# Patient Record
Sex: FEMALE | Race: OTHER | Employment: UNEMPLOYED | URBAN - METROPOLITAN AREA
[De-identification: names, ages, dates, MRNs, and addresses within clinical notes are randomized per-mention and may not be internally consistent; named-entity substitution may affect disease eponyms.]

---

## 2017-01-09 ENCOUNTER — TELEPHONE (OUTPATIENT)
Dept: INTERNAL MEDICINE CLINIC | Facility: CLINIC | Age: 62
End: 2017-01-09

## 2017-01-09 DIAGNOSIS — G47.30 SLEEP APNEA, UNSPECIFIED TYPE: Primary | ICD-10-CM

## 2017-01-09 NOTE — TELEPHONE ENCOUNTER
Patient needs referral for all new C Pap machine part.  Hose, filter, and all other parts.  ( except the machine )

## 2017-01-12 ENCOUNTER — OFFICE VISIT (OUTPATIENT)
Dept: INTERNAL MEDICINE CLINIC | Facility: CLINIC | Age: 62
End: 2017-01-12

## 2017-01-12 VITALS
TEMPERATURE: 98 F | WEIGHT: 155.81 LBS | HEART RATE: 69 BPM | BODY MASS INDEX: 28.67 KG/M2 | SYSTOLIC BLOOD PRESSURE: 134 MMHG | HEIGHT: 62 IN | RESPIRATION RATE: 18 BRPM | DIASTOLIC BLOOD PRESSURE: 78 MMHG

## 2017-01-12 DIAGNOSIS — I10 ESSENTIAL HYPERTENSION: Primary | ICD-10-CM

## 2017-01-12 DIAGNOSIS — E11.9 TYPE 2 DIABETES MELLITUS WITHOUT COMPLICATION, WITH LONG-TERM CURRENT USE OF INSULIN (HCC): ICD-10-CM

## 2017-01-12 DIAGNOSIS — Z79.4 TYPE 2 DIABETES MELLITUS WITHOUT COMPLICATION, WITH LONG-TERM CURRENT USE OF INSULIN (HCC): ICD-10-CM

## 2017-01-12 PROCEDURE — 99212 OFFICE O/P EST SF 10 MIN: CPT | Performed by: INTERNAL MEDICINE

## 2017-01-12 PROCEDURE — 99214 OFFICE O/P EST MOD 30 MIN: CPT | Performed by: INTERNAL MEDICINE

## 2017-01-12 NOTE — ASSESSMENT & PLAN NOTE
Blood pressure 134/78, pulse 69, temperature 97.7 °F (36.5 °C), temperature source Oral, resp. rate 18, height 5' 2\" (1.575 m), weight 155 lb 12.8 oz (70.67 kg), not currently breastfeeding.    Patient is currently on Tiazac at 180 mg once daily with figueroa

## 2017-01-12 NOTE — TELEPHONE ENCOUNTER
please advise as does not meet RN protocol for refill criteria- HgA1c out of parameter          Diabetes Medications  Protocol Criteria:  · Appointment scheduled in the past 6 months or the next 3 months  · A1C < 7.5 in the past 6 months  · Creatinine in

## 2017-01-12 NOTE — PROGRESS NOTES
HPI:    Patient ID: Omar Davis is a 64year old female. Diabetes  She presents for her follow-up diabetic visit. She has type 2 diabetes mellitus. Her disease course has been stable. There are no hypoglycemic associated symptoms.  There are no diabet sedentary lifestyle. Past treatments include angiotensin blockers, diuretics, lifestyle changes and calcium channel blockers (has not yet started the tiazac). The current treatment provides moderate improvement.  Compliance problems include exercise and die 300 MG Oral Cap 1 capsule by mouth twice a day Disp: 180 capsule Rfl: 2   Insulin Aspart Prot & Aspart (INSULIN ASPART PROT & ASPART) (70-30) 100 UNIT/ML Subcutaneous Suspension 55 units subcutaneous twice daily Disp: 45 Device Rfl: 2     Allergies:No Know (primary encounter diagnosis)  Type 2 diabetes mellitus without complication, with long-term current use of insulin (hcc)  Problem List Items Addressed This Visit        Unprioritized    Essential hypertension - Primary     Blood pressure 134/78, pulse 69,

## 2017-01-12 NOTE — PATIENT INSTRUCTIONS
Problem List Items Addressed This Visit        Unprioritized    Essential hypertension - Primary     Blood pressure 134/78, pulse 69, temperature 97.7 °F (36.5 °C), temperature source Oral, resp.  rate 18, height 5' 2\" (1.575 m), weight 155 lb 12.8 oz (70.

## 2017-01-12 NOTE — ASSESSMENT & PLAN NOTE
The blood sugars much better controlled at this time. She has been on NovoLog Mix 70/30 with metformin and blood sugars seem much more stable at this time. She has not had any low sugar reactions.   She is due for recheck labs which have been ordered but

## 2017-01-13 RX ORDER — INSULIN ASPART 100 [IU]/ML
INJECTION, SUSPENSION SUBCUTANEOUS
Qty: 45 DEVICE | Refills: 2 | Status: SHIPPED | OUTPATIENT
Start: 2017-01-13 | End: 2017-05-11

## 2017-01-14 ENCOUNTER — APPOINTMENT (OUTPATIENT)
Dept: LAB | Age: 62
End: 2017-01-14
Attending: INTERNAL MEDICINE
Payer: COMMERCIAL

## 2017-01-14 DIAGNOSIS — Z79.4 TYPE 2 DIABETES MELLITUS WITHOUT COMPLICATION, WITH LONG-TERM CURRENT USE OF INSULIN (HCC): ICD-10-CM

## 2017-01-14 DIAGNOSIS — E11.9 TYPE 2 DIABETES MELLITUS WITHOUT COMPLICATION, WITH LONG-TERM CURRENT USE OF INSULIN (HCC): ICD-10-CM

## 2017-01-14 LAB
ALBUMIN SERPL BCP-MCNC: 3.9 G/DL (ref 3.5–4.8)
ALBUMIN/GLOB SERPL: 1.3 {RATIO} (ref 1–2)
ALP SERPL-CCNC: 49 U/L (ref 32–100)
ALT SERPL-CCNC: 33 U/L (ref 14–54)
ANION GAP SERPL CALC-SCNC: 10 MMOL/L (ref 0–18)
AST SERPL-CCNC: 33 U/L (ref 15–41)
BILIRUB SERPL-MCNC: 0.5 MG/DL (ref 0.3–1.2)
BUN SERPL-MCNC: 18 MG/DL (ref 8–20)
BUN/CREAT SERPL: 18.2 (ref 10–20)
CALCIUM SERPL-MCNC: 9.6 MG/DL (ref 8.5–10.5)
CHLORIDE SERPL-SCNC: 100 MMOL/L (ref 95–110)
CHOLEST SERPL-MCNC: 184 MG/DL (ref 110–200)
CO2 SERPL-SCNC: 29 MMOL/L (ref 22–32)
CREAT SERPL-MCNC: 0.99 MG/DL (ref 0.5–1.5)
GLOBULIN PLAS-MCNC: 3.1 G/DL (ref 2.5–3.7)
GLUCOSE SERPL-MCNC: 167 MG/DL (ref 70–99)
HBA1C MFR BLD: 8.3 % (ref 4–6)
HDLC SERPL-MCNC: 42 MG/DL
LDLC SERPL CALC-MCNC: 100 MG/DL (ref 0–99)
NONHDLC SERPL-MCNC: 142 MG/DL
OSMOLALITY UR CALC.SUM OF ELEC: 294 MOSM/KG (ref 275–295)
POTASSIUM SERPL-SCNC: 4.5 MMOL/L (ref 3.3–5.1)
PROT SERPL-MCNC: 7 G/DL (ref 5.9–8.4)
SODIUM SERPL-SCNC: 139 MMOL/L (ref 136–144)
TRIGL SERPL-MCNC: 211 MG/DL (ref 1–149)

## 2017-01-14 PROCEDURE — 36415 COLL VENOUS BLD VENIPUNCTURE: CPT

## 2017-01-14 PROCEDURE — 83036 HEMOGLOBIN GLYCOSYLATED A1C: CPT

## 2017-01-14 PROCEDURE — 80053 COMPREHEN METABOLIC PANEL: CPT

## 2017-01-14 PROCEDURE — 80061 LIPID PANEL: CPT

## 2017-01-18 RX ORDER — LOSARTAN POTASSIUM AND HYDROCHLOROTHIAZIDE 12.5; 1 MG/1; MG/1
TABLET ORAL
Qty: 90 TABLET | Refills: 0 | Status: SHIPPED | OUTPATIENT
Start: 2017-01-18 | End: 2017-04-20

## 2017-01-18 NOTE — TELEPHONE ENCOUNTER
Hypertensive Medications  Protocol Criteria:  · Appointment scheduled in the past 6 months or in the next 3 months  · BMP or CMP in the past 12 months  · Creatinine result < 2  Recent Visits       Provider Department Primary Dx    6 days ago Rose Sotomayor,

## 2017-01-19 RX ORDER — FENOFIBRATE 134 MG/1
CAPSULE ORAL
Qty: 90 CAPSULE | Refills: 0 | Status: SHIPPED | OUTPATIENT
Start: 2017-01-19 | End: 2017-04-20

## 2017-01-19 NOTE — TELEPHONE ENCOUNTER
Referral for the CPAP supplies has been signed . Please send the order printed out to [facility].

## 2017-01-19 NOTE — TELEPHONE ENCOUNTER
Refilled per protocol    Cholesterol Medications  Protocol Criteria:  · Appointment scheduled in the past 12 months or in the next 3 months  · ALT & LDL on file in the past 12 months  · ALT result < 80  · LDL result <130   Recent Visits       Provider Armand

## 2017-01-24 RX ORDER — ESCITALOPRAM OXALATE 5 MG/1
TABLET ORAL
Qty: 90 TABLET | Refills: 0 | Status: SHIPPED | OUTPATIENT
Start: 2017-01-24 | End: 2017-04-27

## 2017-01-24 NOTE — TELEPHONE ENCOUNTER
Refill Protocol Appointment Criteria  · Appointment scheduled in the past 6 months or in the next 3 months  Recent Visits       Provider Department Primary Dx    1 week ago Ani Donahue MD [facility], [facility], [address], [place] Essential hypertension    1

## 2017-02-01 ENCOUNTER — TELEPHONE (OUTPATIENT)
Dept: INTERNAL MEDICINE CLINIC | Facility: CLINIC | Age: 62
End: 2017-02-01

## 2017-02-21 RX ORDER — PANTOPRAZOLE SODIUM 40 MG/1
TABLET, DELAYED RELEASE ORAL
Qty: 30 TABLET | Refills: 2 | Status: SHIPPED | OUTPATIENT
Start: 2017-02-21 | End: 2017-05-02

## 2017-02-21 NOTE — TELEPHONE ENCOUNTER
Pending Prescriptions Disp Refills    PANTOPRAZOLE SODIUM 40 MG Oral Tab EC [Pharmacy Med Name: PANTOPRAZOLE SOD DR 40 MG TAB] 30 tablet 2     Sig: TAKE 1 TABLET BY MOUTH EVERY MORNING BEFORE BREAKFAST         See refill request  Patient last seen 1-18-1

## 2017-02-24 ENCOUNTER — OFFICE VISIT (OUTPATIENT)
Dept: INTERNAL MEDICINE CLINIC | Facility: CLINIC | Age: 62
End: 2017-02-24

## 2017-02-24 VITALS
DIASTOLIC BLOOD PRESSURE: 76 MMHG | WEIGHT: 152 LBS | SYSTOLIC BLOOD PRESSURE: 136 MMHG | BODY MASS INDEX: 27.97 KG/M2 | HEART RATE: 60 BPM | HEIGHT: 62 IN | RESPIRATION RATE: 16 BRPM

## 2017-02-24 DIAGNOSIS — Z79.4 TYPE 2 DIABETES MELLITUS WITHOUT COMPLICATION, WITH LONG-TERM CURRENT USE OF INSULIN (HCC): Primary | ICD-10-CM

## 2017-02-24 DIAGNOSIS — E78.2 MIXED HYPERLIPIDEMIA: ICD-10-CM

## 2017-02-24 DIAGNOSIS — E11.9 TYPE 2 DIABETES MELLITUS WITHOUT COMPLICATION, WITH LONG-TERM CURRENT USE OF INSULIN (HCC): Primary | ICD-10-CM

## 2017-02-24 DIAGNOSIS — I10 ESSENTIAL HYPERTENSION: ICD-10-CM

## 2017-02-24 PROCEDURE — 99214 OFFICE O/P EST MOD 30 MIN: CPT | Performed by: INTERNAL MEDICINE

## 2017-02-24 PROCEDURE — 99212 OFFICE O/P EST SF 10 MIN: CPT | Performed by: INTERNAL MEDICINE

## 2017-02-24 RX ORDER — GLIMEPIRIDE 2 MG/1
TABLET ORAL
Qty: 30 TABLET | Refills: 3 | Status: SHIPPED | OUTPATIENT
Start: 2017-02-24 | End: 2017-04-28

## 2017-02-24 RX ORDER — INSULIN ASPART 100 [IU]/ML
INJECTION, SUSPENSION SUBCUTANEOUS
Refills: 2 | COMMUNITY
Start: 2017-02-10 | End: 2017-03-15

## 2017-02-24 NOTE — ASSESSMENT & PLAN NOTE
Patient is currently on fenofibrate at 134 mg 1 tablet once daily. She has not needed a statin as yet. Last LDL was at 100. Will recheck labs and consider further medications and treatment as needed.   Advised to start an aspirin at 81 mg 1 tablet once d

## 2017-02-24 NOTE — PATIENT INSTRUCTIONS
Problem List Items Addressed This Visit        Unprioritized    Essential hypertension     Blood pressure 136/76, pulse 60, resp. rate 16, height 5' 2\" (1.575 m), weight 152 lb (68.947 kg), not currently breastfeeding.    Blood pressures look well-controll could be accounting for the late night low sugars as well as morning high sugars.   Advised to dose her insulins with the meal.  In addition have advised to eat 3 egg whites or 4 ounces of chicken or fish with every meal.  Advised to drink a glass of milk–s

## 2017-02-24 NOTE — ASSESSMENT & PLAN NOTE
Blood sugars remain elevated–hemoglobin A1c is at 8.3. She was advised to increase the NovoLog mix to 60 units 2 times daily but patient did not understand it and has not increased the dose of the insulin.   She continues at 56 units 2 times a day with met

## 2017-02-24 NOTE — ASSESSMENT & PLAN NOTE
Blood pressure 136/76, pulse 60, resp. rate 16, height 5' 2\" (1.575 m), weight 152 lb (68.947 kg), not currently breastfeeding. Blood pressures look well-controlled at this time. Kidney functions look stable.   Continue on losartan hydrochlorothiazide a

## 2017-02-24 NOTE — PROGRESS NOTES
HPI:    Patient ID: Francisco Acevedo is a 64year old female. Diabetes  She presents for her follow-up diabetic visit. She has type 2 diabetes mellitus. Her disease course has been stable. There are no hypoglycemic associated symptoms.  There are no diabet Agata Landon MD at 1/15/2017  1:46 AM       Read by Lauryn Stevens at 1/18/2017  5:12 PM      Diabetes is still poorly controlled,please send me sugar records -check before bf and before dinner.    Adv to increase the novolog mix to 60 units 2 times [employer] Negative. Psychiatric/Behavioral: Negative.                Current Outpatient Prescriptions:  NOVOLOG MIX 70/30 FLEXPEN (70-30) 100 UNIT/ML Subcutaneous Suspension Pen-injector 55 UNITS SUBCUTANEOUS TWICE DAILY Disp:  Rfl: 2   glimepiride 2 MG Oral Tab 1 27.79 kg/(m^2). PHYSICAL EXAM:   Physical Exam   Constitutional: She is oriented to person, place, and time. She appears well-developed and well-nourished.    HENT:   Right Ear: External ear normal.   Left Ear: External ear normal.   Nose: Nose normal. daily. Patient has tolerated her medications well. She does not have any significant proteinuria at this time. No chest pain, palpitations, lower extremity swelling noted.   Continue to monitor the blood pressures and renal functions with follow-up labs glimepiride 2 MG Oral Tab          Return in about 4 weeks (around 3/24/2017). PT UNDERSTANDS AND AGREES TO FOLLOW DIRECTIONS AND ADVICE       No orders of the defined types were placed in this encounter.        Meds This Visit:  Signed Prescriptions Disp

## 2017-03-14 ENCOUNTER — TELEPHONE (OUTPATIENT)
Dept: INTERNAL MEDICINE CLINIC | Facility: CLINIC | Age: 62
End: 2017-03-14

## 2017-03-14 ENCOUNTER — HOSPITAL ENCOUNTER (OUTPATIENT)
Age: 62
Discharge: ANOTHER HOSPITAL | End: 2017-03-14
Attending: EMERGENCY MEDICINE
Payer: COMMERCIAL

## 2017-03-14 VITALS
SYSTOLIC BLOOD PRESSURE: 163 MMHG | OXYGEN SATURATION: 98 % | DIASTOLIC BLOOD PRESSURE: 82 MMHG | WEIGHT: 152 LBS | TEMPERATURE: 98 F | RESPIRATION RATE: 16 BRPM | HEIGHT: 62 IN | HEART RATE: 68 BPM | BODY MASS INDEX: 27.97 KG/M2

## 2017-03-14 DIAGNOSIS — R10.12 ABDOMINAL PAIN, LEFT UPPER QUADRANT: ICD-10-CM

## 2017-03-14 DIAGNOSIS — R07.9 ACUTE CHEST PAIN: Primary | ICD-10-CM

## 2017-03-14 PROCEDURE — 99215 OFFICE O/P EST HI 40 MIN: CPT

## 2017-03-14 PROCEDURE — 93010 ELECTROCARDIOGRAM REPORT: CPT | Performed by: EMERGENCY MEDICINE

## 2017-03-14 PROCEDURE — 93010 ELECTROCARDIOGRAM REPORT: CPT

## 2017-03-14 PROCEDURE — 93005 ELECTROCARDIOGRAM TRACING: CPT

## 2017-03-14 NOTE — TELEPHONE ENCOUNTER
Reason for Call/Chief Complaint:   Shoulder, back and abdomin pain  Onset:   3 days  Nursing Assessment/Associated Symptoms:    Patient stated her whole left side has 9/10 dull pain.      Patient stated has chest heaviness, breast pain all over, nausea at t

## 2017-03-14 NOTE — ED PROVIDER NOTES
Patient Seen in: [address]    History   Patient presents with:  Stomach Pain  Chest Pain Angina (cardiovascular)    Stated Complaint: Stomach/Chest/Flank Pain    HPI  Patient reports 3 days of left upper abdominal pain t CONJUNCT W/COLPOSCOPY         Medications :   NOVOLOG MIX 70/30 FLEXPEN (70-30) 100 UNIT/ML Subcutaneous Suspension Pen-injector,  55 UNITS SUBCUTANEOUS TWICE DAILY   glimepiride 2 MG Oral Tab,  1 tablet p.o. daily with breakfast   PANTOPRAZOLE SODI HPI.  Constitutional and vital signs reviewed. All other systems reviewed and negative except as noted above. PSFH elements reviewed from today and agreed except as otherwise stated in HPI.     Physical Exam       ED Triage Vitals   BP 03/14/17 1314 quadrant pain abdominal pain and left anterior chest pain that radiates to her arm was referred to the emergency department for further evaluation. Patient insists on driving herself.   Patient understands the concern for cardiac etiology which can be seri

## 2017-03-14 NOTE — TELEPHONE ENCOUNTER
Pt states that her abdominal pain began on Fri. States that it is constant 9/10. Denies nausea, vomiting , diarrhea. States that it is unaffected by eating-just constant. Pain is in the LLQ. She has tried advil and ASA without significant relief.   See

## 2017-03-14 NOTE — TELEPHONE ENCOUNTER
Pt. States that she is having stomach, back and shoulder pain for past 3 days, and she is not able to sleep well. She wants to know what should she do?

## 2017-03-14 NOTE — TELEPHONE ENCOUNTER
Patient called again and informed Dr. Shadia Alcaraz is with patients. Patient stated [facility] now.

## 2017-03-14 NOTE — ED NOTES
PATIENT [address] VIA EMS. STATES SHE \"HAS TO  HER DAUGHTER\" AND \"GO HOME\" PRIOR TO REPORTING TO THE ER.   REVIEWED AGAIN WITH THE PATIENT THAT IT IS PER THE PHYSICIAN'S RECOMMENDATION THAT SHE REPORT IMMEDIATELY TO THE ER

## 2017-03-14 NOTE — TELEPHONE ENCOUNTER
Per pt, she went to [facility] and her EKG is NOrmal but pt is having severe abd pain and told her to go to ER to do more testing  But pt did not go due to she needs to go and  her kid.

## 2017-03-14 NOTE — ED INITIAL ASSESSMENT (HPI)
REPORTS LEFT UPPER QUADRANT PAIN RADIATING TO CHEST TO AND LEFT ARM X 3 DAYS. PATIENT REPORTS FEELING \"SHORT OF BREATH\"  REPORTS INTERMITTENT NAUSEA BUT DENIES EMESIS. REPORTS INTERMITTENT DIAPHORESIS.

## 2017-03-15 ENCOUNTER — OFFICE VISIT (OUTPATIENT)
Dept: INTERNAL MEDICINE CLINIC | Facility: CLINIC | Age: 62
End: 2017-03-15

## 2017-03-15 ENCOUNTER — LAB ENCOUNTER (OUTPATIENT)
Dept: LAB | Age: 62
End: 2017-03-15
Attending: INTERNAL MEDICINE
Payer: COMMERCIAL

## 2017-03-15 ENCOUNTER — HOSPITAL ENCOUNTER (OUTPATIENT)
Dept: CT IMAGING | Facility: HOSPITAL | Age: 62
Discharge: HOME | End: 2017-03-15
Attending: INTERNAL MEDICINE
Payer: COMMERCIAL

## 2017-03-15 VITALS
HEART RATE: 56 BPM | RESPIRATION RATE: 20 BRPM | WEIGHT: 153 LBS | TEMPERATURE: 98 F | SYSTOLIC BLOOD PRESSURE: 143 MMHG | HEIGHT: 62 IN | OXYGEN SATURATION: 99 % | DIASTOLIC BLOOD PRESSURE: 75 MMHG | BODY MASS INDEX: 28.16 KG/M2

## 2017-03-15 DIAGNOSIS — R10.12 LEFT UPPER QUADRANT PAIN: ICD-10-CM

## 2017-03-15 DIAGNOSIS — R10.12 LEFT UPPER QUADRANT PAIN: Primary | ICD-10-CM

## 2017-03-15 LAB
ALBUMIN SERPL BCP-MCNC: 3.8 G/DL (ref 3.5–4.8)
ALBUMIN/GLOB SERPL: 1.2 {RATIO} (ref 1–2)
ALP SERPL-CCNC: 51 U/L (ref 32–100)
ALT SERPL-CCNC: 27 U/L (ref 14–54)
ANION GAP SERPL CALC-SCNC: 7 MMOL/L (ref 0–18)
AST SERPL-CCNC: 23 U/L (ref 15–41)
BACTERIA UR QL AUTO: NEGATIVE /HPF
BASOPHILS # BLD: 0.1 K/UL (ref 0–0.2)
BASOPHILS NFR BLD: 1 %
BILIRUB SERPL-MCNC: 0.5 MG/DL (ref 0.3–1.2)
BILIRUB UR QL: NEGATIVE
BUN SERPL-MCNC: 19 MG/DL (ref 8–20)
BUN/CREAT SERPL: 19.8 (ref 10–20)
CALCIUM SERPL-MCNC: 9.3 MG/DL (ref 8.5–10.5)
CHLORIDE SERPL-SCNC: 105 MMOL/L (ref 95–110)
CLARITY UR: CLEAR
CO2 SERPL-SCNC: 28 MMOL/L (ref 22–32)
COLOR UR: YELLOW
CREAT SERPL-MCNC: 0.96 MG/DL (ref 0.5–1.5)
EOSINOPHIL # BLD: 0.2 K/UL (ref 0–0.7)
EOSINOPHIL NFR BLD: 4 %
ERYTHROCYTE [DISTWIDTH] IN BLOOD BY AUTOMATED COUNT: 13.4 % (ref 11–15)
GLOBULIN PLAS-MCNC: 3.2 G/DL (ref 2.5–3.7)
GLUCOSE SERPL-MCNC: 159 MG/DL (ref 70–99)
GLUCOSE UR-MCNC: NEGATIVE MG/DL
HCT VFR BLD AUTO: 38.7 % (ref 35–48)
HGB BLD-MCNC: 12.9 G/DL (ref 12–16)
HGB UR QL STRIP.AUTO: NEGATIVE
KETONES UR-MCNC: NEGATIVE MG/DL
LEUKOCYTE ESTERASE UR QL STRIP.AUTO: NEGATIVE
LYMPHOCYTES # BLD: 1.8 K/UL (ref 1–4)
LYMPHOCYTES NFR BLD: 31 %
MCH RBC QN AUTO: 27.6 PG (ref 27–32)
MCHC RBC AUTO-ENTMCNC: 33.2 G/DL (ref 32–37)
MCV RBC AUTO: 83.1 FL (ref 80–100)
MONOCYTES # BLD: 0.5 K/UL (ref 0–1)
MONOCYTES NFR BLD: 9 %
NEUTROPHILS # BLD AUTO: 3.2 K/UL (ref 1.8–7.7)
NEUTROPHILS NFR BLD: 55 %
NITRITE UR QL STRIP.AUTO: NEGATIVE
OSMOLALITY UR CALC.SUM OF ELEC: 296 MOSM/KG (ref 275–295)
PH UR: 6 [PH] (ref 5–8)
PLATELET # BLD AUTO: 325 K/UL (ref 140–400)
PMV BLD AUTO: 8.3 FL (ref 7.4–10.3)
POTASSIUM SERPL-SCNC: 4 MMOL/L (ref 3.3–5.1)
PROT SERPL-MCNC: 7 G/DL (ref 5.9–8.4)
PROT UR-MCNC: NEGATIVE MG/DL
RBC # BLD AUTO: 4.66 M/UL (ref 3.7–5.4)
RBC #/AREA URNS AUTO: 0 /HPF
SODIUM SERPL-SCNC: 140 MMOL/L (ref 136–144)
SP GR UR STRIP: 1.02 (ref 1–1.03)
UROBILINOGEN UR STRIP-ACNC: <2
VIT C UR-MCNC: 20 MG/DL
WBC # BLD AUTO: 5.8 K/UL (ref 4–11)
WBC #/AREA URNS AUTO: 1 /HPF

## 2017-03-15 PROCEDURE — 80053 COMPREHEN METABOLIC PANEL: CPT

## 2017-03-15 PROCEDURE — 85025 COMPLETE CBC W/AUTO DIFF WBC: CPT

## 2017-03-15 PROCEDURE — 81003 URINALYSIS AUTO W/O SCOPE: CPT

## 2017-03-15 PROCEDURE — 36415 COLL VENOUS BLD VENIPUNCTURE: CPT

## 2017-03-15 PROCEDURE — 74176 CT ABD & PELVIS W/O CONTRAST: CPT

## 2017-03-15 PROCEDURE — 99212 OFFICE O/P EST SF 10 MIN: CPT | Performed by: INTERNAL MEDICINE

## 2017-03-15 PROCEDURE — 99214 OFFICE O/P EST MOD 30 MIN: CPT | Performed by: INTERNAL MEDICINE

## 2017-03-15 RX ORDER — CLINDAMYCIN HYDROCHLORIDE 300 MG/1
CAPSULE ORAL
Qty: 20 CAPSULE | Refills: 0 | Status: SHIPPED | OUTPATIENT
Start: 2017-03-15 | End: 2017-05-08

## 2017-03-15 NOTE — TELEPHONE ENCOUNTER
Spoke to pt, she states that she feels better now, has no pain at this time. Dr. Guerrero Pro, can you see pt tomorrow, or can she schedule on another day if she remains pain free?  She states that her  has an appt tomorrow at 1pm but she does not know if she

## 2017-03-15 NOTE — ASSESSMENT & PLAN NOTE
Acute worsening of abdominal pain left upper, lower and left flank. Crampy colicky pain with intermittent improvement. Normal bowel movements. No blood in the urine. No fevers or chills.   History of diverticulosis per colonoscopy in the past.  Abdomina

## 2017-03-15 NOTE — PATIENT INSTRUCTIONS
Problem List Items Addressed This Visit        Unprioritized    Abdominal pain - Primary     Acute worsening of abdominal pain left upper, lower and left flank. Crampy colicky pain with intermittent improvement. Normal bowel movements.   No blood in the u

## 2017-03-15 NOTE — PROGRESS NOTES
Insurance was contacted to obtain PA for STAT CT Abdomen ordered today by [facility]. [employer] stated PA's are handled directly by medical group for this plan.   Called [place] referral dept at [phone], spoke w/Isamar who stated CT has already been \"[facility]

## 2017-03-20 RX ORDER — METFORMIN HYDROCHLORIDE 500 MG/1
TABLET, EXTENDED RELEASE ORAL
Qty: 180 TABLET | Refills: 1 | Status: SHIPPED | OUTPATIENT
Start: 2017-03-20 | End: 2017-11-13

## 2017-03-24 ENCOUNTER — OFFICE VISIT (OUTPATIENT)
Dept: INTERNAL MEDICINE CLINIC | Facility: CLINIC | Age: 62
End: 2017-03-24

## 2017-03-24 VITALS
DIASTOLIC BLOOD PRESSURE: 77 MMHG | HEIGHT: 62 IN | WEIGHT: 151 LBS | RESPIRATION RATE: 16 BRPM | SYSTOLIC BLOOD PRESSURE: 127 MMHG | HEART RATE: 71 BPM | BODY MASS INDEX: 27.79 KG/M2

## 2017-03-24 DIAGNOSIS — E11.9 TYPE 2 DIABETES MELLITUS WITHOUT COMPLICATION, WITH LONG-TERM CURRENT USE OF INSULIN (HCC): ICD-10-CM

## 2017-03-24 DIAGNOSIS — Z79.4 TYPE 2 DIABETES MELLITUS WITHOUT COMPLICATION, WITH LONG-TERM CURRENT USE OF INSULIN (HCC): ICD-10-CM

## 2017-03-24 DIAGNOSIS — L03.311 ABDOMINAL WALL CELLULITIS: Primary | ICD-10-CM

## 2017-03-24 PROCEDURE — 99212 OFFICE O/P EST SF 10 MIN: CPT | Performed by: INTERNAL MEDICINE

## 2017-03-24 PROCEDURE — 99214 OFFICE O/P EST MOD 30 MIN: CPT | Performed by: INTERNAL MEDICINE

## 2017-03-24 NOTE — PROGRESS NOTES
HPI:    Patient ID: Angelina You is a 64year old female.     HPI Comments: Ct abdomen    PROCEDURE:              CT OF THE ABDOMEN AND PELVIS WITHOUT CONTRAST - RENAL STONE PROTOCOL      [facility] ingested medication in small    bowel loops in the lower pelvis. No small bowel mass/lesion or obstruction. Appendix normal.    ABDOMINAL WALL:      Tiny umbilical hernia containing fat.  Mild cutaneous thickening of the anterior abdominal wall, left greate appendicitis. Unremarkable small bowel loops without focal mass or obstruction. 6. Mild cutaneous thickening of the anterior bowel wall, nonspecific. Correlate for possible abdominal wall cellulitis.  Areas of infiltration of the subcutaneous fat of the ab feels fullness and pressure in the epigastrium. Normal BM this am. The pain is located in the LLQ and LUQ. The pain is at a severity of 6/10. The pain is moderate. The quality of the pain is colicky and a sensation of fullness.  The abdominal pain radiates 90 capsule Rfl: 0   LOSARTAN POTASSIUM-HCTZ 100-12.5 MG Oral Tab TAKE 1 TABLET BY MOUTH DAILY.  Disp: 90 tablet Rfl: 0   Insulin Aspart Prot & Aspart (INSULIN ASPART PROT & ASPART) (70-30) 100 UNIT/ML Subcutaneous Suspension 55 units subcutaneous twice stephanie tenderness. Abdominal: Soft. Bowel sounds are normal. She exhibits no distension and no mass. There is no tenderness. There is no rebound. Musculoskeletal: Normal range of motion. Lymphadenopathy:     She has no cervical adenopathy.    Nursing note an [E]    Meds This Visit:  No prescriptions requested or ordered in this encounter    Imaging & Referrals:  None       RN#[id number]

## 2017-03-24 NOTE — ASSESSMENT & PLAN NOTE
Abdominal pain–left upper quadrant has resolved. Patient has completed antibiotics. She does not have any nausea vomiting fevers or chills anymore.   Bowel movements are normal.  She has been taking her insulin shots on her thigh at this time without any

## 2017-03-24 NOTE — ASSESSMENT & PLAN NOTE
Patient is currently on NovoLog mix at 55 units 2 times daily, metformin 500 mg 2 times daily.   She has been on glimepiride at 2 mg 1 tablet once daily with breakfast.  Blood sugars have been running slightly low and hence advised to reduce the NovoLog mix

## 2017-03-24 NOTE — PATIENT INSTRUCTIONS
Problem List Items Addressed This Visit        Unprioritized    Abdominal wall cellulitis - Primary     Abdominal pain–left upper quadrant has resolved. Patient has completed antibiotics. She does not have any nausea vomiting fevers or chills anymore.   B

## 2017-03-27 RX ORDER — CETIRIZINE HYDROCHLORIDE 10 MG/1
TABLET ORAL
Qty: 90 TABLET | Refills: 0 | Status: SHIPPED | OUTPATIENT
Start: 2017-03-27 | End: 2021-05-18 | Stop reason: ALTCHOICE

## 2017-03-27 NOTE — TELEPHONE ENCOUNTER
Rx request for Cetirizine 10 mg, PASSED Allergy Protocol.  Rx filled per protocol    Refill Protocol Appointment Criteria  · Appointment scheduled in the past 12 months or in the next 3 months  Recent Visits       Provider Department Primary Dx    3 days ag

## 2017-04-18 RX ORDER — FENOFIBRATE 134 MG/1
CAPSULE ORAL
Qty: 90 CAPSULE | Refills: 0 | Status: SHIPPED | OUTPATIENT
Start: 2017-04-18 | End: 2018-01-26

## 2017-04-18 RX ORDER — LOSARTAN POTASSIUM AND HYDROCHLOROTHIAZIDE 12.5; 1 MG/1; MG/1
TABLET ORAL
Qty: 90 TABLET | Refills: 0 | Status: SHIPPED | OUTPATIENT
Start: 2017-04-18 | End: 2017-05-08

## 2017-04-18 NOTE — TELEPHONE ENCOUNTER
Hypertensive Medications  Protocol Criteria:  · Appointment scheduled in the past 6 months or in the next 3 months  · BMP or CMP in the past 12 months  · Creatinine result < 2  Recent Visits       Provider Department Primary Dx    3 weeks ago Antonella Griffith

## 2017-04-19 RX ORDER — FENOFIBRATE 134 MG/1
CAPSULE ORAL
Qty: 90 CAPSULE | Refills: 0 | OUTPATIENT
Start: 2017-04-19

## 2017-04-19 RX ORDER — LOSARTAN POTASSIUM AND HYDROCHLOROTHIAZIDE 12.5; 1 MG/1; MG/1
TABLET ORAL
Qty: 90 TABLET | Refills: 0 | OUTPATIENT
Start: 2017-04-19

## 2017-04-20 ENCOUNTER — TELEPHONE (OUTPATIENT)
Dept: INTERNAL MEDICINE CLINIC | Facility: CLINIC | Age: 62
End: 2017-04-20

## 2017-04-20 RX ORDER — HYDROXYZINE HYDROCHLORIDE 10 MG/1
10 TABLET, FILM COATED ORAL DAILY
Qty: 30 TABLET | Refills: 3 | Status: SHIPPED | OUTPATIENT
Start: 2017-04-20 | End: 2017-10-25

## 2017-04-20 RX ORDER — FENOFIBRATE 134 MG/1
CAPSULE ORAL
Qty: 90 CAPSULE | Refills: 0 | Status: SHIPPED | OUTPATIENT
Start: 2017-04-20 | End: 2017-05-08

## 2017-04-20 RX ORDER — CETIRIZINE HYDROCHLORIDE 10 MG/1
TABLET ORAL
Qty: 90 TABLET | Refills: 1 | Status: CANCELLED | OUTPATIENT
Start: 2017-04-20

## 2017-04-20 RX ORDER — LOSARTAN POTASSIUM AND HYDROCHLOROTHIAZIDE 12.5; 1 MG/1; MG/1
TABLET ORAL
Qty: 90 TABLET | Refills: 0 | Status: SHIPPED | OUTPATIENT
Start: 2017-04-20 | End: 2017-05-02

## 2017-04-20 NOTE — TELEPHONE ENCOUNTER
Pt calling states has itching all over body, pt states she lost med Dr gave her in the past for this issue. Pt requesting med for itching, pt has appt 05/08.

## 2017-04-20 NOTE — TELEPHONE ENCOUNTER
Patient stated that has been having itching all over her body for the past 3 days. No rashes. No other symptoms. Patient stated that has had the same symptoms in the past. Patient tried benadryl cream, but did not help much.  Patient stated that Dr Stephy villeda

## 2017-04-21 ENCOUNTER — APPOINTMENT (OUTPATIENT)
Dept: LAB | Age: 62
End: 2017-04-21
Attending: INTERNAL MEDICINE
Payer: COMMERCIAL

## 2017-04-21 DIAGNOSIS — Z79.4 TYPE 2 DIABETES MELLITUS WITHOUT COMPLICATION, WITH LONG-TERM CURRENT USE OF INSULIN (HCC): ICD-10-CM

## 2017-04-21 DIAGNOSIS — E11.9 TYPE 2 DIABETES MELLITUS WITHOUT COMPLICATION, WITH LONG-TERM CURRENT USE OF INSULIN (HCC): ICD-10-CM

## 2017-04-21 PROCEDURE — 36415 COLL VENOUS BLD VENIPUNCTURE: CPT

## 2017-04-21 PROCEDURE — 83036 HEMOGLOBIN GLYCOSYLATED A1C: CPT

## 2017-04-21 PROCEDURE — 82043 UR ALBUMIN QUANTITATIVE: CPT

## 2017-04-21 PROCEDURE — 80061 LIPID PANEL: CPT

## 2017-04-21 PROCEDURE — 82570 ASSAY OF URINE CREATININE: CPT

## 2017-04-21 PROCEDURE — 80053 COMPREHEN METABOLIC PANEL: CPT

## 2017-04-21 PROCEDURE — 81003 URINALYSIS AUTO W/O SCOPE: CPT

## 2017-04-24 NOTE — TELEPHONE ENCOUNTER
Per pharmacy on file,  Pt has a remaing rx med 60 pills of   glimepiride 2 MG Oral Tab  And would like to know if pt can have a 90 days supplies of her glimepiride 2 MG Oral Tab. send to pharmacy on file.

## 2017-04-27 RX ORDER — ESCITALOPRAM OXALATE 5 MG/1
TABLET ORAL
Qty: 90 TABLET | Refills: 0 | Status: SHIPPED | OUTPATIENT
Start: 2017-04-27 | End: 2017-10-25

## 2017-04-27 NOTE — TELEPHONE ENCOUNTER
Refill Protocol Appointment Criteria  · Appointment scheduled in the past 6 months or in the next 3 months  Recent Visits       Provider Department Primary Dx    1 month ago Stacy Reyes MD [facility], [facility], [address], [place] Abdominal wall cellulitis

## 2017-04-28 RX ORDER — GLIMEPIRIDE 2 MG/1
TABLET ORAL
Qty: 90 TABLET | Refills: 1 | Status: SHIPPED | OUTPATIENT
Start: 2017-04-28 | End: 2017-10-19

## 2017-04-28 NOTE — TELEPHONE ENCOUNTER
Please advise on refill request.  Unable to refill per protocol.    Diabetes Medications  Protocol Criteria:  · Appointment scheduled in the past 6 months or the next 3 months  · A1C < 7.5 in the past 6 months  · Creatinine in the past 12 months  · Creatini

## 2017-05-01 ENCOUNTER — TELEPHONE (OUTPATIENT)
Dept: GASTROENTEROLOGY | Facility: CLINIC | Age: 62
End: 2017-05-01

## 2017-05-01 NOTE — TELEPHONE ENCOUNTER
PANTOPRAZOLE SODIUM 40 MG Oral Tab EC TAKE 1 TABLET BY MOUTH EVERY MORNING BEFORE BREAKFAST Disp: 30 tablet Rfl: 2

## 2017-05-02 ENCOUNTER — TELEPHONE (OUTPATIENT)
Dept: GASTROENTEROLOGY | Facility: CLINIC | Age: 62
End: 2017-05-02

## 2017-05-02 RX ORDER — PANTOPRAZOLE SODIUM 40 MG/1
40 TABLET, DELAYED RELEASE ORAL
Qty: 90 TABLET | Refills: 0 | Status: SHIPPED | OUTPATIENT
Start: 2017-05-02 | End: 2017-07-14

## 2017-05-02 NOTE — TELEPHONE ENCOUNTER
Prescription already sent to her [facility] pharmacy and spoke to Kelly Cohen who states they are filling the RX now. Pt  has already been notified that the RX was sent to the pt [facility] pharmacy, but needed an appt for future refills.      Appt already schedule

## 2017-05-02 NOTE — TELEPHONE ENCOUNTER
LM for [place] with her  Francheska Smoke as she was at work at this time (OK per pt consent in chart) and informed him that she needs to CB to schedule an appt for future refills.   He verbalized understanding and will inform the patient so they can CB to sche

## 2017-05-05 RX ORDER — LOSARTAN POTASSIUM AND HYDROCHLOROTHIAZIDE 12.5; 1 MG/1; MG/1
TABLET ORAL
Qty: 90 TABLET | Refills: 1 | Status: SHIPPED | OUTPATIENT
Start: 2017-05-05 | End: 2017-05-08

## 2017-05-08 ENCOUNTER — OFFICE VISIT (OUTPATIENT)
Dept: INTERNAL MEDICINE CLINIC | Facility: CLINIC | Age: 62
End: 2017-05-08

## 2017-05-08 VITALS
HEART RATE: 64 BPM | HEIGHT: 62 IN | BODY MASS INDEX: 28.89 KG/M2 | RESPIRATION RATE: 16 BRPM | SYSTOLIC BLOOD PRESSURE: 131 MMHG | WEIGHT: 157 LBS | DIASTOLIC BLOOD PRESSURE: 72 MMHG

## 2017-05-08 DIAGNOSIS — Z79.4 TYPE 2 DIABETES MELLITUS WITHOUT COMPLICATION, WITH LONG-TERM CURRENT USE OF INSULIN (HCC): ICD-10-CM

## 2017-05-08 DIAGNOSIS — M25.571 ACUTE RIGHT ANKLE PAIN: ICD-10-CM

## 2017-05-08 DIAGNOSIS — I10 ESSENTIAL HYPERTENSION: Primary | ICD-10-CM

## 2017-05-08 DIAGNOSIS — E11.9 TYPE 2 DIABETES MELLITUS WITHOUT COMPLICATION, WITH LONG-TERM CURRENT USE OF INSULIN (HCC): ICD-10-CM

## 2017-05-08 DIAGNOSIS — E78.2 MIXED HYPERLIPIDEMIA: ICD-10-CM

## 2017-05-08 PROCEDURE — 99214 OFFICE O/P EST MOD 30 MIN: CPT | Performed by: INTERNAL MEDICINE

## 2017-05-08 PROCEDURE — 99213 OFFICE O/P EST LOW 20 MIN: CPT | Performed by: INTERNAL MEDICINE

## 2017-05-08 RX ORDER — DILTIAZEM HYDROCHLORIDE 180 MG/1
180 CAPSULE, EXTENDED RELEASE ORAL DAILY
Qty: 90 CAPSULE | Refills: 2 | Status: SHIPPED | OUTPATIENT
Start: 2017-05-08 | End: 2018-02-11

## 2017-05-08 RX ORDER — LEVOTHYROXINE SODIUM 0.05 MG/1
TABLET ORAL
Qty: 90 TABLET | Refills: 1 | Status: SHIPPED | OUTPATIENT
Start: 2017-05-08 | End: 2018-01-19

## 2017-05-08 RX ORDER — LOSARTAN POTASSIUM AND HYDROCHLOROTHIAZIDE 12.5; 1 MG/1; MG/1
1 TABLET ORAL
Qty: 90 TABLET | Refills: 1 | Status: SHIPPED | OUTPATIENT
Start: 2017-05-08 | End: 2018-01-19

## 2017-05-08 RX ORDER — METRONIDAZOLE 500 MG/1
TABLET ORAL
Qty: 10 TABLET | Refills: 0 | Status: SHIPPED | OUTPATIENT
Start: 2017-05-08 | End: 2017-08-25

## 2017-05-08 NOTE — PROGRESS NOTES
HPI:    Patient ID: Cookie Edmond is a 64year old female. Diabetes  She presents for her follow-up diabetic visit. She has type 2 diabetes mellitus. Her disease course has been stable. There are no hypoglycemic associated symptoms.  There are no diabet right ankle–some bony tenderness and swelling. No history of an injury or fall. ). The quality of the pain is described as aching and dull. The pain is at a severity of 5/10. The pain is moderate.  Associated symptoms include joint swelling, a limited range include hypertension, obesity, dyslipidemia, diabetes mellitus, a sedentary lifestyle and post-menopausal.       Review of Systems   Constitutional: Negative. HENT: Negative. Eyes: Negative. Respiratory: Negative. Cardiovascular: Negative.   Neg 1   BD PEN NEEDLE ACRY U/F 32G X 4 MM Does not apply Misc USE AS DIRECTED TWICE A DAY Disp: 100 each Rfl: 6   gabapentin 300 MG Oral Cap 1 capsule by mouth twice a day Disp: 180 capsule Rfl: 2   NOVOLOG MIX 70/30 FLEXPEN (70-30) 100 UNIT/ML Subcutaneous [employer] Thought content normal.   Nursing note and vitals reviewed.              ASSESSMENT/PLAN:   Essential hypertension  (primary encounter diagnosis)  Type 2 diabetes mellitus without complication, with long-term current use of insulin (hcc)  Mixed hyperlipidem 2 times daily and glimepiride 2 mg 1 tablet daily with breakfast.  Her blood sugars seem to have improved. No low sugar reactions at this time. She has been checking blood sugars only in the morning and all the morning sugars seem to be below 100.   Shasha Murguia

## 2017-05-08 NOTE — PATIENT INSTRUCTIONS
Problem List Items Addressed This Visit        Unprioritized    Ankle pain, right     Pain and bony swelling along the lateral malleolus. Movements of the ankle causes pain. Advised to follow-up with Dr. Keerthi Partida for an evaluation.          R sugars to be able to monitor the diabetes better. Hemoglobin A1c looks much improved at 7.8. Eye exam–has an upcoming appointment. Foot exam has been completed. Ongoing issues with abdominal discomfort after eating and diarrhea.   Most likely secondary

## 2017-05-08 NOTE — ASSESSMENT & PLAN NOTE
Patient is currently on NovoLog mix 55 units 2 times daily, metformin 500 mg 2 times daily and glimepiride 2 mg 1 tablet daily with breakfast.  Her blood sugars seem to have improved. No low sugar reactions at this time.   She has been checking blood sugar

## 2017-05-08 NOTE — ASSESSMENT & PLAN NOTE
Lipid panel and liver functions look stable and well controlled on fenofibrate at 134 mcg once daily. LDL continues to be low at 98 and hence not started on a statin as yet. Continue the same dose of medications and recheck labs in the next 4 months.

## 2017-05-08 NOTE — ASSESSMENT & PLAN NOTE
Pain and bony swelling along the lateral malleolus. Movements of the ankle causes pain. Advised to follow-up with Dr. Rai for an evaluation.

## 2017-05-10 ENCOUNTER — OFFICE VISIT (OUTPATIENT)
Dept: PODIATRY CLINIC | Facility: CLINIC | Age: 62
End: 2017-05-10

## 2017-05-10 ENCOUNTER — TELEPHONE (OUTPATIENT)
Dept: INTERNAL MEDICINE CLINIC | Facility: CLINIC | Age: 62
End: 2017-05-10

## 2017-05-10 DIAGNOSIS — E11.9 TYPE 2 DIABETES MELLITUS WITHOUT COMPLICATION, WITHOUT LONG-TERM CURRENT USE OF INSULIN (HCC): ICD-10-CM

## 2017-05-10 DIAGNOSIS — E11.41 DIABETIC MONONEUROPATHY ASSOCIATED WITH TYPE 2 DIABETES MELLITUS (HCC): Primary | ICD-10-CM

## 2017-05-10 PROCEDURE — 99212 OFFICE O/P EST SF 10 MIN: CPT | Performed by: PODIATRIST

## 2017-05-10 PROCEDURE — 99213 OFFICE O/P EST LOW 20 MIN: CPT | Performed by: PODIATRIST

## 2017-05-10 NOTE — PROGRESS NOTES
HPI:    Patient ID: Silvia Espinosa is a 64year old female. HPI  This 49-year-old female presents for follow-up have not seen this patient in quite a while. Her chief complaint is for a diabetic foot evaluation with burning in both feet.   The burning i % External Lotion Apply 1 Application topically 2 (two) times daily.  Disp: 500 g Rfl: 1   Meclizine HCl 12.5 MG Oral Tab 1 tablet by mouth twice a day when necessary Disp: 30 tablet Rfl: 0   BD PEN NEEDLE CARY U/F 32G X 4 MM Does not apply Misc USE AS DIRE

## 2017-05-10 NOTE — TELEPHONE ENCOUNTER
[facility] ext [phone] Patient is due for DAA eye exam. Please assist patient in scheduling appointment when he returns call.  Thanks

## 2017-05-12 RX ORDER — INSULIN ASPART 100 [IU]/ML
INJECTION, SUSPENSION SUBCUTANEOUS
Qty: 45 PEN | Refills: 2 | Status: SHIPPED | OUTPATIENT
Start: 2017-05-12 | End: 2018-02-07

## 2017-05-19 ENCOUNTER — TELEPHONE (OUTPATIENT)
Dept: PODIATRY CLINIC | Facility: CLINIC | Age: 62
End: 2017-05-19

## 2017-05-19 DIAGNOSIS — E11.41 DIABETIC MONONEUROPATHY ASSOCIATED WITH TYPE 2 DIABETES MELLITUS (HCC): Primary | ICD-10-CM

## 2017-05-19 NOTE — TELEPHONE ENCOUNTER
pt called. States Dr. Maranda Avila referred her to neurolgist,  Dr. Parul Sharp. They will need a referral to make an appt. Please advise.

## 2017-05-31 ENCOUNTER — HOSPITAL ENCOUNTER (OUTPATIENT)
Dept: GENERAL RADIOLOGY | Facility: HOSPITAL | Age: 62
Discharge: HOME | End: 2017-05-31
Attending: ORTHOPAEDIC SURGERY
Payer: COMMERCIAL

## 2017-05-31 ENCOUNTER — OFFICE VISIT (OUTPATIENT)
Dept: ORTHOPEDICS CLINIC | Facility: CLINIC | Age: 62
End: 2017-05-31

## 2017-05-31 DIAGNOSIS — M25.571 RIGHT ANKLE PAIN, UNSPECIFIED CHRONICITY: ICD-10-CM

## 2017-05-31 DIAGNOSIS — M25.571 RIGHT ANKLE PAIN, UNSPECIFIED CHRONICITY: Primary | ICD-10-CM

## 2017-05-31 PROCEDURE — 73610 X-RAY EXAM OF ANKLE: CPT | Performed by: ORTHOPAEDIC SURGERY

## 2017-05-31 PROCEDURE — L1906 AFO MULTILIG ANK SUP PRE OTS: HCPCS | Performed by: ORTHOPAEDIC SURGERY

## 2017-05-31 PROCEDURE — 99243 OFF/OP CNSLTJ NEW/EST LOW 30: CPT | Performed by: ORTHOPAEDIC SURGERY

## 2017-05-31 PROCEDURE — 99212 OFFICE O/P EST SF 10 MIN: CPT | Performed by: ORTHOPAEDIC SURGERY

## 2017-05-31 NOTE — H&P
NURSING INTAKE COMMENTS: Patient presents with: Ankle Pain: right- pt states pain started 3 wks ago. pt denies any injury      HPI: This 64year old female presents today with complaints of right ankle pain.   It started about 3 weeks ago without any obvio 500 MG Oral Tab 1 tab po bid Disp: 10 tablet Rfl: 0   Levothyroxine Sodium 50 MCG Oral Tab TAKE 1 TABLET BY MOUTH DAILY BEFORE BREAKFAST Disp: 90 tablet Rfl: 1   Losartan Potassium-HCTZ 100-12.5 MG Oral Tab Take 1 tablet by mouth once daily.  Disp: 90 table Children: N/A     Occupational History  None on file     Social History Main Topics   Smoking status: Never Smoker     Smokeless tobacco: Never Used    Alcohol Use: No    Drug Use: No    Sexual Activity: Not on file   Not on file  Other Topics Concern    C

## 2017-06-05 ENCOUNTER — TELEPHONE (OUTPATIENT)
Dept: INTERNAL MEDICINE CLINIC | Facility: CLINIC | Age: 62
End: 2017-06-05

## 2017-06-05 DIAGNOSIS — E11.9 DIABETES MELLITUS WITHOUT COMPLICATION (HCC): Primary | ICD-10-CM

## 2017-06-05 NOTE — TELEPHONE ENCOUNTER
Pt needs a referral for her appt on 6/15 with dr. Yoko Reyes (optometry).  For a f/u visit  Please call when referral is placed in system

## 2017-06-15 ENCOUNTER — OFFICE VISIT (OUTPATIENT)
Dept: OPTOMETRY | Facility: CLINIC | Age: 62
End: 2017-06-15

## 2017-06-15 DIAGNOSIS — H52.4 HYPEROPIA WITH PRESBYOPIA, BILATERAL: ICD-10-CM

## 2017-06-15 DIAGNOSIS — Z79.4 TYPE 2 DIABETES MELLITUS WITHOUT COMPLICATION, WITH LONG-TERM CURRENT USE OF INSULIN (HCC): Primary | ICD-10-CM

## 2017-06-15 DIAGNOSIS — H26.9 CORTICAL CATARACT: ICD-10-CM

## 2017-06-15 DIAGNOSIS — H52.03 HYPEROPIA WITH PRESBYOPIA, BILATERAL: ICD-10-CM

## 2017-06-15 DIAGNOSIS — E11.9 TYPE 2 DIABETES MELLITUS WITHOUT COMPLICATION, WITH LONG-TERM CURRENT USE OF INSULIN (HCC): Primary | ICD-10-CM

## 2017-06-15 DIAGNOSIS — H25.13 AGE-RELATED NUCLEAR CATARACT OF BOTH EYES: ICD-10-CM

## 2017-06-15 PROCEDURE — 92014 COMPRE OPH EXAM EST PT 1/>: CPT | Performed by: OPTOMETRIST

## 2017-06-15 NOTE — PROGRESS NOTES
Mónica Vanegas is a 64year old female. HPI:     HPI     Diabetic Eye Exam   Diabetes characteristics include Type 2, controlled with diet, taking oral medications and on insulin. Duration of 13 years.            Comments   Patient is in for an annual d Social History:   Smoking Status: Never Smoker                      Smokeless Status: Never Used                        Alcohol Use: No                Medications:    Current Outpatient Prescriptions:  NOVOLOG MIX 70/30 FLEXPEN (70-30) 100 UNIT/ML [employer] Disp: 180 capsule Rfl: 2       Allergies:  No Known Allergies    ROS:     ROS     Negative for: Constitutional, Gastrointestinal, Neurological, Skin, Genitourinary, Musculoskeletal, HENT, Endocrine, Cardiovascular, Eyes, Respiratory, Psychiatric, Allergic/ they should continue to keep their blood sugar under control and continue to see their physician as directed. I stressed the importance of yearly diabetic eye exams.  Patient has been advised to call immediately if they notice any changes or problems with t

## 2017-06-15 NOTE — PROGRESS NOTES
Kristofer Ac is a 64year old female. HPI:     HPI     Diabetic Eye Exam   Diabetes characteristics include Type 2, controlled with diet, taking oral medications and on insulin. Duration of 13.            Comments   Patient is in for an annual diabeti Social History:   Smoking Status: Never Smoker                      Smokeless Status: Never Used                        Alcohol Use: No                Medications:    Current Outpatient Prescriptions:  NOVOLOG MIX 70/30 FLEXPEN (70-30) 100 UNIT/ML [employer] Disp: 180 capsule Rfl: 2       Allergies:  No Known Allergies    ROS:     ROS     Negative for: Constitutional, Gastrointestinal, Neurological, Skin, Genitourinary, Musculoskeletal, HENT, Endocrine, Cardiovascular, Eyes, Respiratory, Psychiatric, Allergic/ they should continue to keep their blood sugar under control and continue to see their physician as directed. I stressed the importance of yearly diabetic eye exams.  Patient has been advised to call immediately if they notice any changes or problems with t

## 2017-06-15 NOTE — PATIENT INSTRUCTIONS
Type II diabetes mellitus ([facility].)  I advised patient that there is no background diabetic retinopathy in either eye and that they should continue to keep their blood sugar under control and continue to see their physician as directed.  I stressed the importan

## 2017-06-15 NOTE — PROGRESS NOTES
Beata Hartley is a 64year old female. HPI:     HPI     Diabetic Eye Exam   Diabetes characteristics include Type 2, controlled with diet, taking oral medications and on insulin. Duration of 13 years.            Comments   Patient is in for an annual d Social History:   Smoking Status: Never Smoker                      Smokeless Status: Never Used                        Alcohol Use: No                Medications:    Current Outpatient Prescriptions:  NOVOLOG MIX 70/30 FLEXPEN (70-30) 100 UNIT/ML [place] Disp: 180 capsule Rfl: 2       Allergies:  No Known Allergies    ROS:     ROS     Negative for: Constitutional, Gastrointestinal, Neurological, Skin, Genitourinary, Musculoskeletal, HENT, Endocrine, Cardiovascular, Eyes, Respiratory, Psychiatric, Allergic/ in either eye and that they should continue to keep their blood sugar under control and continue to see their physician as directed. I stressed the importance of yearly diabetic eye exams.  Patient has been advised to call immediately if they notice any elie

## 2017-06-22 ENCOUNTER — APPOINTMENT (OUTPATIENT)
Dept: LAB | Age: 62
End: 2017-06-22
Attending: INTERNAL MEDICINE
Payer: COMMERCIAL

## 2017-06-22 DIAGNOSIS — I10 ESSENTIAL HYPERTENSION: ICD-10-CM

## 2017-06-22 PROCEDURE — 80048 BASIC METABOLIC PNL TOTAL CA: CPT

## 2017-06-22 PROCEDURE — 36415 COLL VENOUS BLD VENIPUNCTURE: CPT

## 2017-07-08 ENCOUNTER — TELEPHONE (OUTPATIENT)
Dept: INTERNAL MEDICINE CLINIC | Facility: CLINIC | Age: 62
End: 2017-07-08

## 2017-07-14 ENCOUNTER — OFFICE VISIT (OUTPATIENT)
Dept: GASTROENTEROLOGY | Facility: CLINIC | Age: 62
End: 2017-07-14

## 2017-07-14 VITALS
BODY MASS INDEX: 29.26 KG/M2 | HEART RATE: 66 BPM | SYSTOLIC BLOOD PRESSURE: 131 MMHG | WEIGHT: 153 LBS | DIASTOLIC BLOOD PRESSURE: 80 MMHG | HEIGHT: 60.5 IN

## 2017-07-14 DIAGNOSIS — K21.9 GASTROESOPHAGEAL REFLUX DISEASE WITHOUT ESOPHAGITIS: Primary | ICD-10-CM

## 2017-07-14 PROCEDURE — 99212 OFFICE O/P EST SF 10 MIN: CPT | Performed by: INTERNAL MEDICINE

## 2017-07-14 RX ORDER — PANTOPRAZOLE SODIUM 40 MG/1
40 TABLET, DELAYED RELEASE ORAL
Qty: 90 TABLET | Refills: 3 | Status: SHIPPED | OUTPATIENT
Start: 2017-07-14 | End: 2017-07-28

## 2017-07-14 NOTE — PROGRESS NOTES
[facility] Follow-up Visit    Yvonne easily intubated and was normal to the squamocolumnar  junction at 40 cm. 3. The stomach was viewed in its entirety. There was moderate gastric  erythema predominantly in the prepyloric region. No ulcerations or mass  lesions were seen.   Biopsies times W/COLPOSCOPY  1991: LAPAROSCOPIC CHOLECYSTECTOMY  2011: UPPER GI ENDOSCOPY,BIOPSY      Comment: EGD with Biopsy   Family History   Problem Relation Age of Onset   • Diabetes Father    • Heart Disease Father      Coronary artery disease   • Diabetes Mother ammonium lactate 12 % External Lotion Apply 1 Application topically 2 (two) times daily.  Disp: 500 g Rfl: 1   BD PEN NEEDLE CARY U/F 32G X 4 MM Does not apply Misc USE AS DIRECTED TWICE A DAY Disp: 100 each Rfl: 6   gabapentin 300 MG Oral Cap 1 capsule b cholecystectomy, hypertension, hyperlipidemia, prior upper endoscopy and colonoscopy, who presents today for f/u of GERD. Since being on pantoprazole 40mg/day she is entirely asx.      With regard to screening, she had a colonoscopy in 2010 which at that ti

## 2017-07-15 ENCOUNTER — TELEPHONE (OUTPATIENT)
Dept: INTERNAL MEDICINE CLINIC | Facility: CLINIC | Age: 62
End: 2017-07-15

## 2017-07-15 RX ORDER — FENOFIBRATE 134 MG/1
CAPSULE ORAL
Qty: 90 CAPSULE | Refills: 1 | Status: SHIPPED | OUTPATIENT
Start: 2017-07-15 | End: 2017-08-25

## 2017-07-15 NOTE — TELEPHONE ENCOUNTER
Refill Protocol Appointment Criteria: Refilled per protocol    · Appointment scheduled in the past 12 months or in the next 3 months  Recent Outpatient Visits            1 month ago Type 2 diabetes mellitus without complication, with long-term current use

## 2017-07-15 NOTE — TELEPHONE ENCOUNTER
Pharmacist is calling to inform PA is needed for Strips.  Please advise      Glucose Blood (ACCU-CHEK ESHA) In Vitro Strip 100 each 2 7/15/2017     Sig: To check blood glucose twice daily    Notes to Pharmacy: Please dispense insurance approved    E-Prescr

## 2017-07-18 NOTE — TELEPHONE ENCOUNTER
PA for Accu-chek Mellisa test strIps completed with [employer] via CMM response time 24-72 hours KEY [id number].

## 2017-07-21 NOTE — TELEPHONE ENCOUNTER
PA denied. Plan states diabetic strips only covered when using insulin pump. Patient may purchase strips OOP.

## 2017-07-22 NOTE — TELEPHONE ENCOUNTER
This is truly ridiculous. Diabetic test supplies are provided to every patient on insulin and oral antidiabetic agents. So this message stating that diabetic test strips are only for the insulin pump patients is absolutely wrong.   Most likely we will nee

## 2017-07-24 ENCOUNTER — TELEPHONE (OUTPATIENT)
Dept: INTERNAL MEDICINE CLINIC | Facility: CLINIC | Age: 62
End: 2017-07-24

## 2017-07-24 NOTE — TELEPHONE ENCOUNTER
Patient needs refills on two medications. Current Outpatient Prescriptions:  Levothyroxine Sodium 50 MCG Oral Tab TAKE 1 TABLET BY MOUTH DAILY BEFORE BREAKFAST Disp: 90 tablet Rfl: 1     One is for thyroid. The second medication is for blood pressure.

## 2017-07-25 RX ORDER — LEVOTHYROXINE SODIUM 0.05 MG/1
TABLET ORAL
Qty: 90 TABLET | Refills: 1 | OUTPATIENT
Start: 2017-07-25

## 2017-07-25 RX ORDER — BLOOD-GLUCOSE METER
EACH MISCELLANEOUS
Qty: 1 KIT | Refills: 0 | Status: SHIPPED | OUTPATIENT
Start: 2017-07-25 | End: 2021-02-11

## 2017-07-25 RX ORDER — LANCETS 33 GAUGE
1 EACH MISCELLANEOUS 2 TIMES DAILY
Qty: 100 EACH | Refills: 2 | Status: SHIPPED | OUTPATIENT
Start: 2017-07-25 | End: 2018-05-09

## 2017-07-28 RX ORDER — PANTOPRAZOLE SODIUM 40 MG/1
40 TABLET, DELAYED RELEASE ORAL
Qty: 90 TABLET | Refills: 0 | Status: SHIPPED | OUTPATIENT
Start: 2017-07-28 | End: 2017-10-26

## 2017-07-28 NOTE — TELEPHONE ENCOUNTER
See refill request  Patient last seen 7-14-17.    Medication last refilled 7-14-17    Pending Prescriptions Disp Refills    PANTOPRAZOLE SODIUM 40 MG Oral Tab EC [Pharmacy Med Name: PANTOPRAZOLE SOD DR 40 MG TAB] 90 tablet 0     Sig: TAKE 1 TABLET (40 MG TO

## 2017-08-15 ENCOUNTER — OFFICE VISIT (OUTPATIENT)
Dept: OBGYN CLINIC | Facility: CLINIC | Age: 62
End: 2017-08-15

## 2017-08-15 VITALS
DIASTOLIC BLOOD PRESSURE: 76 MMHG | WEIGHT: 155.63 LBS | HEART RATE: 64 BPM | SYSTOLIC BLOOD PRESSURE: 131 MMHG | HEIGHT: 60 IN | BODY MASS INDEX: 30.55 KG/M2

## 2017-08-15 DIAGNOSIS — Z01.419 ENCOUNTER FOR GYNECOLOGICAL EXAMINATION WITHOUT ABNORMAL FINDING: Primary | ICD-10-CM

## 2017-08-15 DIAGNOSIS — Z12.31 VISIT FOR SCREENING MAMMOGRAM: ICD-10-CM

## 2017-08-15 PROCEDURE — 99396 PREV VISIT EST AGE 40-64: CPT | Performed by: OBSTETRICS & GYNECOLOGY

## 2017-08-19 NOTE — PROGRESS NOTES
Arya Wood is a 64year old female  No LMP recorded (lmp unknown).  Patient is postmenopausal. Patient presents with:  Gyn Exam: Annual  .    OBSTETRICS HISTORY:  OB History    Para Term  AB Living   3 3 3     3   SAB TAB Ectopic Mu   Spouse name: N/A    Years of education: N/A  Number of children: N/A     Occupational History  None on file     Social History Main Topics   Smoking status: Never Smoker    Smokeless tobacco: Never Used    Alcohol use No    Drug use: No    Sexual Potassium-HCTZ 100-12.5 MG Oral Tab, Take 1 tablet by mouth once daily. , Disp: 90 tablet, Rfl: 1  •  glimepiride 2 MG Oral Tab, 1 tablet p.o. daily with breakfast, Disp: 90 tablet, Rfl: 1  •  ESCITALOPRAM 5 MG Oral Tab, TAKE 1 TABLET (5 MG TOTAL) BY MOUTH anemia, easy bruising or bleeding.       PHYSICAL EXAM:   Constitutional: well developed, well nourished  Head/Face: normocephalic  Neck/Thyroid: thyroid symmetric, no thyromegaly, no nodules, no adenopathy  Lymphatic:no abnormal supraclavicular or axillary

## 2017-08-25 ENCOUNTER — OFFICE VISIT (OUTPATIENT)
Dept: INTERNAL MEDICINE CLINIC | Facility: CLINIC | Age: 62
End: 2017-08-25

## 2017-08-25 VITALS
SYSTOLIC BLOOD PRESSURE: 147 MMHG | HEART RATE: 57 BPM | RESPIRATION RATE: 16 BRPM | BODY MASS INDEX: 28.34 KG/M2 | WEIGHT: 154 LBS | HEIGHT: 62 IN | DIASTOLIC BLOOD PRESSURE: 76 MMHG

## 2017-08-25 DIAGNOSIS — Z79.4 TYPE 2 DIABETES MELLITUS WITHOUT COMPLICATION, WITH LONG-TERM CURRENT USE OF INSULIN (HCC): ICD-10-CM

## 2017-08-25 DIAGNOSIS — E78.2 MIXED HYPERLIPIDEMIA: ICD-10-CM

## 2017-08-25 DIAGNOSIS — E11.9 TYPE 2 DIABETES MELLITUS WITHOUT COMPLICATION, WITH LONG-TERM CURRENT USE OF INSULIN (HCC): ICD-10-CM

## 2017-08-25 DIAGNOSIS — E55.9 VITAMIN D DEFICIENCY: ICD-10-CM

## 2017-08-25 DIAGNOSIS — N18.30 CKD STAGE 3 DUE TO TYPE 2 DIABETES MELLITUS (HCC): ICD-10-CM

## 2017-08-25 DIAGNOSIS — I10 ESSENTIAL HYPERTENSION: Primary | ICD-10-CM

## 2017-08-25 DIAGNOSIS — E11.22 CKD STAGE 3 DUE TO TYPE 2 DIABETES MELLITUS (HCC): ICD-10-CM

## 2017-08-25 PROCEDURE — 99212 OFFICE O/P EST SF 10 MIN: CPT | Performed by: INTERNAL MEDICINE

## 2017-08-25 PROCEDURE — 99214 OFFICE O/P EST MOD 30 MIN: CPT | Performed by: INTERNAL MEDICINE

## 2017-08-25 NOTE — ASSESSMENT & PLAN NOTE
Stage III kidney disease–most likely related to ibuprofen use patient has been using ibuprofen–Advil off-and-on for headaches and body aches. Advised to stop all use of ibuprofen, Advil, Aleve or naproxen at home.   Blood pressure slightly elevated today w

## 2017-08-25 NOTE — ASSESSMENT & PLAN NOTE
Patient is currently on NovoLog mix 55 units 2 times daily, metformin 500 mg 2 times daily and glimepiride 2 mg 1 tablet daily with breakfast.  The blood sugars at home checked only prior to dinner's seem to fluctuate anywhere between   Patient's eat

## 2017-08-25 NOTE — PROGRESS NOTES
HPI:    Patient ID: Angelina You is a 64year old female. Diabetes   She presents for her follow-up diabetic visit. She has type 2 diabetes mellitus. Her disease course has been stable. There are no hypoglycemic associated symptoms.  There are no diabe associated agents to hypertension. Risk factors for coronary artery disease include diabetes mellitus, dyslipidemia, post-menopausal state, obesity and sedentary lifestyle.  Past treatments include angiotensin blockers, diuretics, lifestyle changes and calc treatment provided no relief. Her past medical history is significant for diabetes and osteoarthritis. Review of Systems   Constitutional: Negative. HENT: Negative. Eyes: Negative. Respiratory: Negative. Cardiovascular: Negative.   Negativ tablet (10 mg total) by mouth daily. Disp: 30 tablet Rfl: 3   FENOFIBRATE MICRONIZED 134 MG Oral Cap TAKE ONE CAPSULE BY MOUTH EVERY DAY WITH FOOD Disp: 90 capsule Rfl: 0   CETIRIZINE 10 MG Oral Tab TAKE 1 TABLET (10 MG TOTAL) BY MOUTH DAILY.  Disp: 90 tabl person, place, and time. She has normal reflexes. No cranial nerve deficit. She exhibits normal muscle tone. Coordination normal.   Skin: No rash noted. No erythema. Psychiatric: She has a normal mood and affect.  Her behavior is normal. Thought content n dose of medications and will monitor labs at the next blood draw.          Type II diabetes mellitus ([facility] [address]     Patient is currently on NovoLog mix 55 units 2 times daily, metformin 500 mg 2 times daily and glimepiride 2 mg 1 tablet daily with breakfast.  Th

## 2017-08-25 NOTE — PATIENT INSTRUCTIONS
Problem List Items Addressed This Visit        Unprioritized    CKD stage 3 due to type 2 diabetes mellitus ([facility] [address]     Stage III kidney disease–most likely related to ibuprofen use patient has been using ibuprofen–Advil off-and-on for headaches and body ach anywhere between   Patient's eating habits are not stable. She does tend to eat snacks that most likely is causing the fluctuation. Advised strict diet control as discussed at the office visit today.   Maintain blood sugar records in writing before

## 2017-08-25 NOTE — ASSESSMENT & PLAN NOTE
Lipid panel and liver function tests look stable on fenofibrate at 134 mcg once daily. LDL levels are quite low and hence not started on a statin. Continue the same dose of medications and will monitor labs at the next blood draw.

## 2017-08-25 NOTE — ASSESSMENT & PLAN NOTE
Blood pressure 147/76, pulse 57, resp. rate 16, height 5' 2\" (1.575 m), weight 154 lb (69.9 kg), not currently breastfeeding. Recheck labs fasting in the next few weeks. Blood pressure remains elevated–most likely due to ibuprofen use.   Currently o

## 2017-08-31 ENCOUNTER — TELEPHONE (OUTPATIENT)
Dept: GASTROENTEROLOGY | Facility: CLINIC | Age: 62
End: 2017-08-31

## 2017-08-31 NOTE — TELEPHONE ENCOUNTER
I called and left a voicemail message for pt regarding her overdue pending labs.   I sent a [employer] message to pt    em

## 2017-09-08 ENCOUNTER — LAB ENCOUNTER (OUTPATIENT)
Dept: LAB | Age: 62
End: 2017-09-08
Attending: SURGERY
Payer: COMMERCIAL

## 2017-09-08 ENCOUNTER — TELEPHONE (OUTPATIENT)
Dept: GASTROENTEROLOGY | Facility: CLINIC | Age: 62
End: 2017-09-08

## 2017-09-08 DIAGNOSIS — E11.9 TYPE 2 DIABETES MELLITUS WITHOUT COMPLICATION, WITH LONG-TERM CURRENT USE OF INSULIN (HCC): ICD-10-CM

## 2017-09-08 DIAGNOSIS — E55.9 VITAMIN D DEFICIENCY: ICD-10-CM

## 2017-09-08 DIAGNOSIS — K21.9 GASTROESOPHAGEAL REFLUX DISEASE WITHOUT ESOPHAGITIS: ICD-10-CM

## 2017-09-08 DIAGNOSIS — Z79.4 TYPE 2 DIABETES MELLITUS WITHOUT COMPLICATION, WITH LONG-TERM CURRENT USE OF INSULIN (HCC): ICD-10-CM

## 2017-09-08 LAB
25(OH)D3 SERPL-MCNC: 51.2 NG/ML
ALBUMIN SERPL BCP-MCNC: 3.8 G/DL (ref 3.5–4.8)
ALBUMIN/GLOB SERPL: 1.3 {RATIO} (ref 1–2)
ALP SERPL-CCNC: 46 U/L (ref 32–100)
ALT SERPL-CCNC: 28 U/L (ref 14–54)
ANION GAP SERPL CALC-SCNC: 8 MMOL/L (ref 0–18)
AST SERPL-CCNC: 25 U/L (ref 15–41)
BACTERIA UR QL AUTO: NEGATIVE /HPF
BASOPHILS # BLD: 0.1 K/UL (ref 0–0.2)
BASOPHILS NFR BLD: 1 %
BILIRUB SERPL-MCNC: 0.4 MG/DL (ref 0.3–1.2)
BILIRUB UR QL: NEGATIVE
BUN SERPL-MCNC: 13 MG/DL (ref 8–20)
BUN/CREAT SERPL: 12.5 (ref 10–20)
CALCIUM SERPL-MCNC: 9.4 MG/DL (ref 8.5–10.5)
CHLORIDE SERPL-SCNC: 101 MMOL/L (ref 95–110)
CHOLEST SERPL-MCNC: 161 MG/DL (ref 110–200)
CLARITY UR: CLEAR
CO2 SERPL-SCNC: 30 MMOL/L (ref 22–32)
COLOR UR: YELLOW
CREAT SERPL-MCNC: 1.04 MG/DL (ref 0.5–1.5)
CREAT UR-MCNC: 130 MG/DL
EOSINOPHIL # BLD: 0.2 K/UL (ref 0–0.7)
EOSINOPHIL NFR BLD: 4 %
ERYTHROCYTE [DISTWIDTH] IN BLOOD BY AUTOMATED COUNT: 13.5 % (ref 11–15)
GLOBULIN PLAS-MCNC: 3 G/DL (ref 2.5–3.7)
GLUCOSE SERPL-MCNC: 137 MG/DL (ref 70–99)
GLUCOSE UR-MCNC: NEGATIVE MG/DL
HBA1C MFR BLD: 7.2 % (ref 4–6)
HCT VFR BLD AUTO: 40.6 % (ref 35–48)
HDLC SERPL-MCNC: 37 MG/DL
HGB BLD-MCNC: 13.4 G/DL (ref 12–16)
HGB UR QL STRIP.AUTO: NEGATIVE
KETONES UR-MCNC: NEGATIVE MG/DL
LDLC SERPL CALC-MCNC: 93 MG/DL (ref 0–99)
LYMPHOCYTES # BLD: 1.7 K/UL (ref 1–4)
LYMPHOCYTES NFR BLD: 31 %
MAGNESIUM SERPL-MCNC: 1.7 MG/DL (ref 1.8–2.5)
MCH RBC QN AUTO: 27.7 PG (ref 27–32)
MCHC RBC AUTO-ENTMCNC: 33 G/DL (ref 32–37)
MCV RBC AUTO: 83.9 FL (ref 80–100)
MICROALBUMIN UR-MCNC: 1.5 MG/DL (ref 0–1.8)
MICROALBUMIN/CREAT UR: 11.5 MG/G{CREAT} (ref 0–20)
MONOCYTES # BLD: 0.5 K/UL (ref 0–1)
MONOCYTES NFR BLD: 9 %
NEUTROPHILS # BLD AUTO: 3 K/UL (ref 1.8–7.7)
NEUTROPHILS NFR BLD: 55 %
NITRITE UR QL STRIP.AUTO: NEGATIVE
NONHDLC SERPL-MCNC: 124 MG/DL
OSMOLALITY UR CALC.SUM OF ELEC: 290 MOSM/KG (ref 275–295)
PH UR: 7 [PH] (ref 5–8)
PLATELET # BLD AUTO: 394 K/UL (ref 140–400)
PMV BLD AUTO: 7.9 FL (ref 7.4–10.3)
POTASSIUM SERPL-SCNC: 4.3 MMOL/L (ref 3.3–5.1)
PROT SERPL-MCNC: 6.8 G/DL (ref 5.9–8.4)
PROT UR-MCNC: NEGATIVE MG/DL
RBC # BLD AUTO: 4.84 M/UL (ref 3.7–5.4)
RBC #/AREA URNS AUTO: <1 /HPF
SODIUM SERPL-SCNC: 139 MMOL/L (ref 136–144)
SP GR UR STRIP: 1.02 (ref 1–1.03)
TRIGL SERPL-MCNC: 153 MG/DL (ref 1–149)
TSH SERPL-ACNC: 2.85 UIU/ML (ref 0.45–5.33)
UROBILINOGEN UR STRIP-ACNC: <2
VIT B12 SERPL-MCNC: 594 PG/ML (ref 181–914)
VIT C UR-MCNC: 20 MG/DL
WBC # BLD AUTO: 5.4 K/UL (ref 4–11)
WBC #/AREA URNS AUTO: 3 /HPF

## 2017-09-08 PROCEDURE — 82607 VITAMIN B-12: CPT

## 2017-09-08 PROCEDURE — 83036 HEMOGLOBIN GLYCOSYLATED A1C: CPT

## 2017-09-08 PROCEDURE — 83735 ASSAY OF MAGNESIUM: CPT

## 2017-09-08 PROCEDURE — 85025 COMPLETE CBC W/AUTO DIFF WBC: CPT

## 2017-09-08 PROCEDURE — 81001 URINALYSIS AUTO W/SCOPE: CPT

## 2017-09-08 PROCEDURE — 82570 ASSAY OF URINE CREATININE: CPT

## 2017-09-08 PROCEDURE — 80061 LIPID PANEL: CPT

## 2017-09-08 PROCEDURE — 36415 COLL VENOUS BLD VENIPUNCTURE: CPT

## 2017-09-08 PROCEDURE — 82306 VITAMIN D 25 HYDROXY: CPT

## 2017-09-08 PROCEDURE — 84443 ASSAY THYROID STIM HORMONE: CPT

## 2017-09-08 PROCEDURE — 80053 COMPREHEN METABOLIC PANEL: CPT

## 2017-09-08 PROCEDURE — 82043 UR ALBUMIN QUANTITATIVE: CPT

## 2017-09-08 NOTE — TELEPHONE ENCOUNTER
D/w patient re; slightly low Mag, she will supplement by taking magnesium rich foods in her diet. B12 wnl. Continue PPI.

## 2017-09-15 RX ORDER — GABAPENTIN 300 MG/1
CAPSULE ORAL
Qty: 180 CAPSULE | Refills: 0 | Status: SHIPPED | OUTPATIENT
Start: 2017-09-15 | End: 2018-03-09

## 2017-10-03 ENCOUNTER — TELEPHONE (OUTPATIENT)
Dept: INTERNAL MEDICINE CLINIC | Facility: CLINIC | Age: 62
End: 2017-10-03

## 2017-10-03 DIAGNOSIS — G47.33 OBSTRUCTIVE SLEEP APNEA: Primary | ICD-10-CM

## 2017-10-03 NOTE — TELEPHONE ENCOUNTER
[facility] called. Patient need new CPAP supplies. Please sign referral order if you agree. Thank you.

## 2017-10-09 ENCOUNTER — OFFICE VISIT (OUTPATIENT)
Dept: INTERNAL MEDICINE CLINIC | Facility: CLINIC | Age: 62
End: 2017-10-09

## 2017-10-09 VITALS
DIASTOLIC BLOOD PRESSURE: 70 MMHG | WEIGHT: 153 LBS | HEIGHT: 62 IN | BODY MASS INDEX: 28.16 KG/M2 | RESPIRATION RATE: 16 BRPM | SYSTOLIC BLOOD PRESSURE: 136 MMHG | HEART RATE: 60 BPM

## 2017-10-09 DIAGNOSIS — Z79.4 TYPE 2 DIABETES MELLITUS WITHOUT COMPLICATION, WITH LONG-TERM CURRENT USE OF INSULIN (HCC): ICD-10-CM

## 2017-10-09 DIAGNOSIS — I10 ESSENTIAL HYPERTENSION: Primary | ICD-10-CM

## 2017-10-09 DIAGNOSIS — N18.30 CKD STAGE 3 DUE TO TYPE 2 DIABETES MELLITUS (HCC): ICD-10-CM

## 2017-10-09 DIAGNOSIS — E11.22 CKD STAGE 3 DUE TO TYPE 2 DIABETES MELLITUS (HCC): ICD-10-CM

## 2017-10-09 DIAGNOSIS — E11.9 TYPE 2 DIABETES MELLITUS WITHOUT COMPLICATION, WITH LONG-TERM CURRENT USE OF INSULIN (HCC): ICD-10-CM

## 2017-10-09 PROCEDURE — 99214 OFFICE O/P EST MOD 30 MIN: CPT | Performed by: INTERNAL MEDICINE

## 2017-10-09 PROCEDURE — 90686 IIV4 VACC NO PRSV 0.5 ML IM: CPT | Performed by: INTERNAL MEDICINE

## 2017-10-09 PROCEDURE — 99212 OFFICE O/P EST SF 10 MIN: CPT | Performed by: INTERNAL MEDICINE

## 2017-10-09 PROCEDURE — 90471 IMMUNIZATION ADMIN: CPT | Performed by: INTERNAL MEDICINE

## 2017-10-09 NOTE — PROGRESS NOTES
HPI:    Patient ID: Vika Stanley is a 64year old female. Diabetes   She presents for her follow-up diabetic visit. She has type 2 diabetes mellitus. Her disease course has been stable. There are no hypoglycemic associated symptoms.  There are no diabe diuretics, lifestyle changes and calcium channel blockers (has not yet started the tiazac). The current treatment provides moderate improvement. Compliance problems include exercise and diet. Hypertensive end-organ damage includes kidney disease.  There is Cardiovascular: Negative. Negative for chest pain. Gastrointestinal: Negative. Endocrine: Negative. Genitourinary: Negative. Musculoskeletal: Positive for stiffness. Negative for myalgias. Skin: Negative. Allergic/Immunologic: Negative. DAY WITH FOOD Disp: 90 capsule Rfl: 0   CETIRIZINE 10 MG Oral Tab TAKE 1 TABLET (10 MG TOTAL) BY MOUTH DAILY.  Disp: 90 tablet Rfl: 0   METFORMIN HCL  MG Oral Tablet 24 Hr TAKE 2 TABLETS BY MOUTH EVERY DAY WITH EVENING MEAL Disp: 180 tablet Rfl: 1   a behavior is normal. Thought content normal.   Nursing note and vitals reviewed.              ASSESSMENT/PLAN:     Problem List Items Addressed This Visit        Unprioritized    CKD stage 3 due to type 2 diabetes mellitus ([facility] [address]     Patient continues to be i THYROID STIM HORMONE      Other Visit Diagnoses    None. Return in about 4 months (around 2/9/2018).     PT UNDERSTANDS AND AGREES TO FOLLOW DIRECTIONS AND ADVICE      Orders Placed This Encounter      CBC W Differential W Platelet [E]      Comp Met

## 2017-10-10 NOTE — ASSESSMENT & PLAN NOTE
She is currently on NovoLog mix at 55 units 2 times daily, metformin 500 mg 2 times daily and glimepiride 2 mg once daily with breakfast.  She has occasional low sugar reactions but most likely related to not eating on time.   Advised to eat all 3 meals and

## 2017-10-10 NOTE — ASSESSMENT & PLAN NOTE
Blood pressure 136/70, pulse 60, resp. rate 16, height 5' 2\" (1.575 m), weight 153 lb (69.4 kg), not currently breastfeeding. Blood pressures look stable and well controlled. Renal function seem to have improved.   Currently on diltiazem at 180 mg onc

## 2017-10-10 NOTE — PATIENT INSTRUCTIONS
Problem List Items Addressed This Visit        Unprioritized    CKD stage 3 due to type 2 diabetes mellitus ([facility] [address]     Patient continues to be in stage III kidney disease but her creatinine has improved and her EGFR has improved from 40-56 at this time.   Sh

## 2017-10-10 NOTE — ASSESSMENT & PLAN NOTE
Patient continues to be in stage III kidney disease but her creatinine has improved and her EGFR has improved from 40-56 at this time. She has stopped using all Advil, Aleve, ibuprofen and naproxen-advised to avoid future use of these medications.   Blood

## 2017-10-19 RX ORDER — GLIMEPIRIDE 2 MG/1
TABLET ORAL
Qty: 90 TABLET | Refills: 0 | Status: SHIPPED | OUTPATIENT
Start: 2017-10-19 | End: 2018-02-11

## 2017-10-19 NOTE — TELEPHONE ENCOUNTER
Refilled per protocol      Diabetes Medications  Protocol Criteria:  · Appointment scheduled in the past 6 months or the next 3 months  · A1C < 7.5 in the past 6 months  · Creatinine in the past 12 months  · Creatinine result < 1.5   Recent Outpatient Visi

## 2017-10-25 RX ORDER — ESCITALOPRAM OXALATE 5 MG/1
TABLET ORAL
Qty: 90 TABLET | Refills: 1 | Status: SHIPPED | OUTPATIENT
Start: 2017-10-25 | End: 2021-05-18 | Stop reason: ALTCHOICE

## 2017-10-25 RX ORDER — HYDROXYZINE HYDROCHLORIDE 10 MG/1
10 TABLET, FILM COATED ORAL DAILY
Qty: 90 TABLET | Refills: 1 | Status: SHIPPED | OUTPATIENT
Start: 2017-10-25 | End: 2018-06-18

## 2017-11-13 ENCOUNTER — OFFICE VISIT (OUTPATIENT)
Dept: ORTHOPEDICS CLINIC | Facility: CLINIC | Age: 62
End: 2017-11-13

## 2017-11-13 DIAGNOSIS — S86.301A INJURY OF PERONEAL TENDON OF RIGHT FOOT, INITIAL ENCOUNTER: ICD-10-CM

## 2017-11-13 DIAGNOSIS — M25.571 RIGHT ANKLE PAIN, UNSPECIFIED CHRONICITY: Primary | ICD-10-CM

## 2017-11-13 PROCEDURE — 99212 OFFICE O/P EST SF 10 MIN: CPT | Performed by: ORTHOPAEDIC SURGERY

## 2017-11-13 PROCEDURE — 99213 OFFICE O/P EST LOW 20 MIN: CPT | Performed by: ORTHOPAEDIC SURGERY

## 2017-11-13 NOTE — PROGRESS NOTES
Patient returns for follow-up. Despite the multi-ligament brace and giving it time since I saw her in May she continues to have pain along the lateral aspect of her right ankle along the peroneal tendons.   At this stage because of the persistence of pain

## 2017-11-16 ENCOUNTER — HOSPITAL ENCOUNTER (OUTPATIENT)
Dept: MRI IMAGING | Age: 62
Discharge: HOME | End: 2017-11-16
Attending: ORTHOPAEDIC SURGERY
Payer: COMMERCIAL

## 2017-11-16 ENCOUNTER — TELEPHONE (OUTPATIENT)
Dept: PODIATRY CLINIC | Facility: CLINIC | Age: 62
End: 2017-11-16

## 2017-11-16 DIAGNOSIS — S86.301A INJURY OF PERONEAL TENDON OF RIGHT FOOT, INITIAL ENCOUNTER: ICD-10-CM

## 2017-11-16 DIAGNOSIS — M25.571 RIGHT ANKLE PAIN, UNSPECIFIED CHRONICITY: ICD-10-CM

## 2017-11-16 PROCEDURE — 73721 MRI JNT OF LWR EXTRE W/O DYE: CPT | Performed by: ORTHOPAEDIC SURGERY

## 2017-11-16 RX ORDER — METFORMIN HYDROCHLORIDE 500 MG/1
TABLET, EXTENDED RELEASE ORAL
Qty: 180 TABLET | Refills: 1 | Status: SHIPPED | OUTPATIENT
Start: 2017-11-16 | End: 2018-09-14

## 2017-11-16 NOTE — TELEPHONE ENCOUNTER
Called and no answer. Left voice message. REquested return call to make follow up appointment with Dr. Aislinn Edwards for MRI review.

## 2017-11-16 NOTE — TELEPHONE ENCOUNTER
RTC 2/2018. Chart reviewed. Refills sent per Triage Dept protocol.        Diabetes Medications  Protocol Criteria:  · Appointment scheduled in the past 6 months or the next 3 months  · A1C < 7.5 in the past 6 months  · Creatinine in the past 12 months  · Cr

## 2017-11-30 ENCOUNTER — OFFICE VISIT (OUTPATIENT)
Dept: ORTHOPEDICS CLINIC | Facility: CLINIC | Age: 62
End: 2017-11-30

## 2017-11-30 VITALS — SYSTOLIC BLOOD PRESSURE: 132 MMHG | DIASTOLIC BLOOD PRESSURE: 78 MMHG | RESPIRATION RATE: 16 BRPM | HEART RATE: 72 BPM

## 2017-11-30 DIAGNOSIS — M25.571 ARTHRALGIA OF RIGHT ANKLE: Primary | ICD-10-CM

## 2017-11-30 PROCEDURE — 20605 DRAIN/INJ JOINT/BURSA W/O US: CPT | Performed by: ORTHOPAEDIC SURGERY

## 2017-11-30 NOTE — PROCEDURES
Procedure: Under sterile preparation and after timeout was performed and consent was obtained, the patient's right ankle at the level of the distal fibula posteriorly into the peroneal tendon sheath was injected with 20 mg of Kenalog and 2 mL of 1% lidocai

## 2017-11-30 NOTE — PROGRESS NOTES
Patient presents for the results of her MRI. It does show a peroneal brevis longitudinal split tear.   We are going to try conservative treatment in the form of a cortisone injection first.  If this does not help, I would then like her to see my partner

## 2017-11-30 NOTE — PROGRESS NOTES
Per verbal order from VT, draw up 2ml of Kenalog 10 and 2ml of 1% lidocaine for cortisone injection to right ankle.  Sadaf Neville

## 2017-12-02 ENCOUNTER — TELEPHONE (OUTPATIENT)
Dept: INTERNAL MEDICINE CLINIC | Facility: CLINIC | Age: 62
End: 2017-12-02

## 2017-12-02 NOTE — TELEPHONE ENCOUNTER
Pt's  called in requesting a refill for the following prescriptions:      Current Outpatient Prescriptions:   •  METFORMIN HCL  MG Oral Tablet 24 Hr, TAKE 2 TABLETS BY MOUTH EVERY DAY WITH EVENING MEAL, Disp: 180 tablet, Rfl: 1  •  Insulin Pen

## 2017-12-04 NOTE — TELEPHONE ENCOUNTER
Per the pharmacy the patient has Metformin refills and the patient pick the medication up on 12/3/17  Does need an order for pen needle.     Refilled per protocol    Diabetes Medications  Protocol Criteria:  · Appointment scheduled in the past 6 months or t

## 2017-12-05 RX ORDER — PEN NEEDLE, DIABETIC 32GX 5/32"
NEEDLE, DISPOSABLE MISCELLANEOUS
Qty: 100 EACH | Refills: 2 | Status: SHIPPED | OUTPATIENT
Start: 2017-12-05 | End: 2021-02-11

## 2017-12-05 NOTE — TELEPHONE ENCOUNTER
Refill Protocol Appointment Criteria  · Appointment scheduled in the past 12 months or in the next 3 months  Recent Outpatient Visits            5 days ago Arthralgia of right ankle    [facility], [address], [place]

## 2017-12-18 ENCOUNTER — HOSPITAL ENCOUNTER (OUTPATIENT)
Dept: MAMMOGRAPHY | Age: 62
Discharge: HOME | End: 2017-12-18
Attending: OBSTETRICS & GYNECOLOGY
Payer: COMMERCIAL

## 2017-12-18 DIAGNOSIS — Z12.31 VISIT FOR SCREENING MAMMOGRAM: ICD-10-CM

## 2017-12-18 PROCEDURE — 77067 SCR MAMMO BI INCL CAD: CPT | Performed by: OBSTETRICS & GYNECOLOGY

## 2018-01-15 ENCOUNTER — NURSE TRIAGE (OUTPATIENT)
Dept: OTHER | Age: 63
End: 2018-01-15

## 2018-01-15 ENCOUNTER — OFFICE VISIT (OUTPATIENT)
Dept: INTERNAL MEDICINE CLINIC | Facility: CLINIC | Age: 63
End: 2018-01-15

## 2018-01-15 VITALS
DIASTOLIC BLOOD PRESSURE: 72 MMHG | BODY MASS INDEX: 28.89 KG/M2 | RESPIRATION RATE: 16 BRPM | HEIGHT: 62 IN | TEMPERATURE: 100 F | WEIGHT: 157 LBS | HEART RATE: 77 BPM | SYSTOLIC BLOOD PRESSURE: 134 MMHG

## 2018-01-15 DIAGNOSIS — J06.9 UPPER RESPIRATORY TRACT INFECTION, UNSPECIFIED TYPE: Primary | ICD-10-CM

## 2018-01-15 PROCEDURE — 99212 OFFICE O/P EST SF 10 MIN: CPT | Performed by: PHYSICIAN ASSISTANT

## 2018-01-15 RX ORDER — AMOXICILLIN AND CLAVULANATE POTASSIUM 875; 125 MG/1; MG/1
1 TABLET, FILM COATED ORAL 2 TIMES DAILY
Qty: 20 TABLET | Refills: 0 | Status: SHIPPED | OUTPATIENT
Start: 2018-01-15 | End: 2018-01-25

## 2018-01-15 RX ORDER — BENZONATATE 100 MG/1
100 CAPSULE ORAL 3 TIMES DAILY PRN
Qty: 30 CAPSULE | Refills: 0 | Status: SHIPPED | OUTPATIENT
Start: 2018-01-15 | End: 2018-03-09

## 2018-01-15 NOTE — PATIENT INSTRUCTIONS
Tylenol 500 mg every 4-6 hours as needed for pain and fever  Benzonatate - one capsule very 8 hours as needed for cough  Robitussin or Delsym as needed for cough - avoid anything with phenylephrine or pseudoephedrine  Increase fluid intake to stay well hyd

## 2018-01-15 NOTE — PROGRESS NOTES
HPI:    Patient ID: Tanvi Love is a 58year old female. Patient presents today with upper respiratory symptoms for the last 3 days. Has had non-productive cough, sore throat, body aches, headaches, nasal congestion, and back pain.  Symptoms are worse 10 MG Oral Tab TAKE 1 TABLET (10 MG TOTAL) BY MOUTH DAILY. Disp: 90 tablet Rfl: 1   GLIMEPIRIDE 2 MG Oral Tab TAKE 1 TABLET BY MOUTH EVERY DAY WITH BREAKFAST.  Disp: 90 tablet Rfl: 0   GABAPENTIN 300 MG Oral Cap TAKE 1 CAPSULE BY MOUTH TWICE A DAY Disp: 180 Mouth/Throat: Posterior oropharyngeal erythema present. Post nasal drip   Eyes: Conjunctivae are normal. Pupils are equal, round, and reactive to light. Neck: Normal range of motion. Neck supple.    Cardiovascular: Normal rate, regular rhythm and norm

## 2018-01-15 NOTE — TELEPHONE ENCOUNTER
Action Requested: Summary for Provider     []  Critical Lab, Recommendations Needed  [] Need Additional Advice  []   FYI    []   Need Orders  [] Need Medications Sent to Pharmacy  []  Other     SUMMARY: appt scheduled for eval w/ Rushing PA today   Advised

## 2018-01-19 RX ORDER — LOSARTAN POTASSIUM AND HYDROCHLOROTHIAZIDE 12.5; 1 MG/1; MG/1
1 TABLET ORAL
Qty: 90 TABLET | Refills: 1 | Status: SHIPPED | OUTPATIENT
Start: 2018-01-19 | End: 2018-05-08

## 2018-01-19 RX ORDER — LEVOTHYROXINE SODIUM 0.05 MG/1
TABLET ORAL
Qty: 90 TABLET | Refills: 1 | Status: SHIPPED | OUTPATIENT
Start: 2018-01-19 | End: 2018-07-26

## 2018-01-19 NOTE — ASSESSMENT & PLAN NOTE
I advised patient that there is no background diabetic retinopathy in either eye and that they should continue to keep their blood sugar under control and continue to see their physician as directed. I stressed the importance of yearly diabetic eye exams.
No treatment is required. Will continue to observe.
No treatment is required. Will continue to observe.
Patient is happy with her current reading glasses and will use +1.00 OTC for TV viewing.
Patent

## 2018-01-26 ENCOUNTER — HOSPITAL ENCOUNTER (OUTPATIENT)
Dept: GENERAL RADIOLOGY | Facility: HOSPITAL | Age: 63
Discharge: HOME | End: 2018-01-26
Attending: INTERNAL MEDICINE
Payer: COMMERCIAL

## 2018-01-26 ENCOUNTER — OFFICE VISIT (OUTPATIENT)
Dept: INTERNAL MEDICINE CLINIC | Facility: CLINIC | Age: 63
End: 2018-01-26

## 2018-01-26 VITALS
DIASTOLIC BLOOD PRESSURE: 78 MMHG | HEIGHT: 62 IN | SYSTOLIC BLOOD PRESSURE: 138 MMHG | TEMPERATURE: 98 F | RESPIRATION RATE: 18 BRPM | BODY MASS INDEX: 28.16 KG/M2 | HEART RATE: 61 BPM | WEIGHT: 153 LBS

## 2018-01-26 DIAGNOSIS — J45.40 MODERATE PERSISTENT ASTHMATIC BRONCHITIS WITHOUT COMPLICATION: Primary | ICD-10-CM

## 2018-01-26 DIAGNOSIS — J45.40 MODERATE PERSISTENT ASTHMATIC BRONCHITIS WITHOUT COMPLICATION: ICD-10-CM

## 2018-01-26 PROBLEM — J45.909 ASTHMATIC BRONCHITIS WITHOUT COMPLICATION: Status: ACTIVE | Noted: 2018-01-26

## 2018-01-26 PROBLEM — J45.909 ASTHMATIC BRONCHITIS WITHOUT COMPLICATION (HCC): Status: ACTIVE | Noted: 2018-01-26

## 2018-01-26 PROCEDURE — 99212 OFFICE O/P EST SF 10 MIN: CPT | Performed by: INTERNAL MEDICINE

## 2018-01-26 PROCEDURE — 99214 OFFICE O/P EST MOD 30 MIN: CPT | Performed by: INTERNAL MEDICINE

## 2018-01-26 PROCEDURE — 71046 X-RAY EXAM CHEST 2 VIEWS: CPT | Performed by: INTERNAL MEDICINE

## 2018-01-26 RX ORDER — CODEINE PHOSPHATE AND GUAIFENESIN 10; 100 MG/5ML; MG/5ML
SOLUTION ORAL
Qty: 100 ML | Refills: 0 | Status: SHIPPED | OUTPATIENT
Start: 2018-01-26 | End: 2018-03-09

## 2018-01-26 RX ORDER — PREDNISONE 1 MG/1
TABLET ORAL
Qty: 15 TABLET | Refills: 0 | Status: SHIPPED | OUTPATIENT
Start: 2018-01-26 | End: 2018-03-09

## 2018-01-26 RX ORDER — FENOFIBRATE 134 MG/1
CAPSULE ORAL
Qty: 90 CAPSULE | Refills: 2 | Status: SHIPPED | OUTPATIENT
Start: 2018-01-26 | End: 2018-11-03

## 2018-01-26 NOTE — PROGRESS NOTES
HPI:    Patient ID: Sandi Ambriz is a 58year old female. Per last visit with Wilfred waddell    1. Upper respiratory tract infection, unspecified type  Has had worsening upper respiratory symptoms in the last few days.  Advised Tylenol 500 mg every 4-6 DAY WITH FOOD Disp: 90 capsule Rfl: 2   predniSONE 5 MG Oral Tab 1 TAB PO BID X 5 DAYS  AND THEN PO Q D X 5 DAYS Disp: 15 tablet Rfl: 0   Albuterol Sulfate 108 (90 Base) MCG/ACT Inhalation Aerosol Powder, Breath Activated Inhale 2 puffs into the lungs 3 (t (70-30) 100 UNIT/ML Subcutaneous Suspension Pen-injector 55 UNITS SUBCUTANEOUS TWICE DAILY Disp: 45 pen Rfl: 2   DilTIAZem HCl ER Beads (TIAZAC) 180 MG Oral Capsule SR 24 Hr Take 1 capsule (180 mg total) by mouth daily.  Disp: 90 capsule Rfl: 2   CETIRIZINE no cervical adenopathy. Skin: No rash noted. Nursing note and vitals reviewed.              ASSESSMENT/PLAN:     Problem List Items Addressed This Visit        Unprioritized    Asthmatic bronchitis without complication - Primary     Advised to continue

## 2018-01-26 NOTE — ASSESSMENT & PLAN NOTE
Advised to continue on the Augmentin. Started on Tussionex with codeine 1 teaspoon full 2 times daily to suppress the cough. Chest x-ray is ordered. Prednisone 5 mg twice daily for 5 days and then once daily for 5 days.   Albuterol inhaler 2 puffs 2 time

## 2018-01-26 NOTE — PATIENT INSTRUCTIONS
Problem List Items Addressed This Visit        Unprioritized    Asthmatic bronchitis without complication - Primary     Advised to continue on the Augmentin. Started on Tussionex with codeine 1 teaspoon full 2 times daily to suppress the cough.   Chest x-r

## 2018-02-07 RX ORDER — INSULIN ASPART 100 [IU]/ML
INJECTION, SUSPENSION SUBCUTANEOUS
Qty: 45 PEN | Refills: 2 | Status: SHIPPED | OUTPATIENT
Start: 2018-02-07 | End: 2018-11-15

## 2018-02-07 NOTE — TELEPHONE ENCOUNTER
Diabetes Medications  Protocol Criteria:  · Appointment scheduled in the past 6 months or the next 3 months  · A1C < 7.5 in the past 6 months  · Creatinine in the past 12 months  · Creatinine result < 1.5   Recent Outpatient Visits            1 week ago Mo

## 2018-02-14 RX ORDER — DILTIAZEM HYDROCHLORIDE 180 MG/1
180 CAPSULE, EXTENDED RELEASE ORAL DAILY
Qty: 90 CAPSULE | Refills: 2 | Status: SHIPPED | OUTPATIENT
Start: 2018-02-14 | End: 2018-11-05

## 2018-02-14 RX ORDER — GLIMEPIRIDE 2 MG/1
TABLET ORAL
Qty: 90 TABLET | Refills: 2 | Status: SHIPPED | OUTPATIENT
Start: 2018-02-14 | End: 2018-12-27

## 2018-02-22 ENCOUNTER — LAB ENCOUNTER (OUTPATIENT)
Dept: LAB | Age: 63
End: 2018-02-22
Attending: INTERNAL MEDICINE
Payer: COMMERCIAL

## 2018-02-22 DIAGNOSIS — E11.9 TYPE 2 DIABETES MELLITUS WITHOUT COMPLICATION, WITH LONG-TERM CURRENT USE OF INSULIN (HCC): ICD-10-CM

## 2018-02-22 DIAGNOSIS — Z79.4 TYPE 2 DIABETES MELLITUS WITHOUT COMPLICATION, WITH LONG-TERM CURRENT USE OF INSULIN (HCC): ICD-10-CM

## 2018-02-22 LAB
ALBUMIN SERPL BCP-MCNC: 3.7 G/DL (ref 3.5–4.8)
ALBUMIN/GLOB SERPL: 1.3 {RATIO} (ref 1–2)
ALP SERPL-CCNC: 40 U/L (ref 32–100)
ALT SERPL-CCNC: 26 U/L (ref 14–54)
ANION GAP SERPL CALC-SCNC: 10 MMOL/L (ref 0–18)
AST SERPL-CCNC: 22 U/L (ref 15–41)
BACTERIA UR QL AUTO: NEGATIVE /HPF
BASOPHILS # BLD: 0.1 K/UL (ref 0–0.2)
BASOPHILS NFR BLD: 2 %
BILIRUB SERPL-MCNC: 0.5 MG/DL (ref 0.3–1.2)
BILIRUB UR QL: NEGATIVE
BUN SERPL-MCNC: 11 MG/DL (ref 8–20)
BUN/CREAT SERPL: 11.5 (ref 10–20)
CALCIUM SERPL-MCNC: 9.1 MG/DL (ref 8.5–10.5)
CHLORIDE SERPL-SCNC: 102 MMOL/L (ref 95–110)
CHOLEST SERPL-MCNC: 195 MG/DL (ref 110–200)
CLARITY UR: CLEAR
CO2 SERPL-SCNC: 28 MMOL/L (ref 22–32)
COLOR UR: YELLOW
CREAT SERPL-MCNC: 0.96 MG/DL (ref 0.5–1.5)
CREAT UR-MCNC: 93.6 MG/DL
EOSINOPHIL # BLD: 0.4 K/UL (ref 0–0.7)
EOSINOPHIL NFR BLD: 8 %
ERYTHROCYTE [DISTWIDTH] IN BLOOD BY AUTOMATED COUNT: 13.4 % (ref 11–15)
GLOBULIN PLAS-MCNC: 2.8 G/DL (ref 2.5–3.7)
GLUCOSE SERPL-MCNC: 106 MG/DL (ref 70–99)
GLUCOSE UR-MCNC: NEGATIVE MG/DL
HBA1C MFR BLD: 7.8 % (ref 4–6)
HCT VFR BLD AUTO: 39.5 % (ref 35–48)
HDLC SERPL-MCNC: 37 MG/DL
HGB BLD-MCNC: 13.3 G/DL (ref 12–16)
HGB UR QL STRIP.AUTO: NEGATIVE
KETONES UR-MCNC: NEGATIVE MG/DL
LDLC SERPL CALC-MCNC: 121 MG/DL (ref 0–99)
LYMPHOCYTES # BLD: 1.5 K/UL (ref 1–4)
LYMPHOCYTES NFR BLD: 28 %
MCH RBC QN AUTO: 27.5 PG (ref 27–32)
MCHC RBC AUTO-ENTMCNC: 33.7 G/DL (ref 32–37)
MCV RBC AUTO: 81.6 FL (ref 80–100)
MICROALBUMIN UR-MCNC: 1.2 MG/DL (ref 0–1.8)
MICROALBUMIN/CREAT UR: 12.8 MG/G{CREAT} (ref 0–20)
MONOCYTES # BLD: 0.5 K/UL (ref 0–1)
MONOCYTES NFR BLD: 10 %
NEUTROPHILS # BLD AUTO: 2.8 K/UL (ref 1.8–7.7)
NEUTROPHILS NFR BLD: 53 %
NITRITE UR QL STRIP.AUTO: NEGATIVE
NONHDLC SERPL-MCNC: 158 MG/DL
OSMOLALITY UR CALC.SUM OF ELEC: 290 MOSM/KG (ref 275–295)
PATIENT FASTING: YES
PH UR: 7 [PH] (ref 5–8)
PLATELET # BLD AUTO: 381 K/UL (ref 140–400)
PMV BLD AUTO: 8 FL (ref 7.4–10.3)
POTASSIUM SERPL-SCNC: 4 MMOL/L (ref 3.3–5.1)
PROT SERPL-MCNC: 6.5 G/DL (ref 5.9–8.4)
PROT UR-MCNC: NEGATIVE MG/DL
RBC # BLD AUTO: 4.84 M/UL (ref 3.7–5.4)
RBC #/AREA URNS AUTO: <1 /HPF
SODIUM SERPL-SCNC: 140 MMOL/L (ref 136–144)
SP GR UR STRIP: 1.01 (ref 1–1.03)
TRIGL SERPL-MCNC: 187 MG/DL (ref 1–149)
TSH SERPL-ACNC: 1.78 UIU/ML (ref 0.45–5.33)
UROBILINOGEN UR STRIP-ACNC: <2
VIT C UR-MCNC: 40 MG/DL
WBC # BLD AUTO: 5.3 K/UL (ref 4–11)
WBC #/AREA URNS AUTO: 4 /HPF

## 2018-02-22 PROCEDURE — 80061 LIPID PANEL: CPT

## 2018-02-22 PROCEDURE — 84443 ASSAY THYROID STIM HORMONE: CPT

## 2018-02-22 PROCEDURE — 82043 UR ALBUMIN QUANTITATIVE: CPT

## 2018-02-22 PROCEDURE — 36415 COLL VENOUS BLD VENIPUNCTURE: CPT

## 2018-02-22 PROCEDURE — 81001 URINALYSIS AUTO W/SCOPE: CPT

## 2018-02-22 PROCEDURE — 82570 ASSAY OF URINE CREATININE: CPT

## 2018-02-22 PROCEDURE — 83036 HEMOGLOBIN GLYCOSYLATED A1C: CPT

## 2018-02-22 PROCEDURE — 80053 COMPREHEN METABOLIC PANEL: CPT

## 2018-02-22 PROCEDURE — 85025 COMPLETE CBC W/AUTO DIFF WBC: CPT

## 2018-03-09 ENCOUNTER — OFFICE VISIT (OUTPATIENT)
Dept: INTERNAL MEDICINE CLINIC | Facility: CLINIC | Age: 63
End: 2018-03-09

## 2018-03-09 VITALS
HEIGHT: 62 IN | DIASTOLIC BLOOD PRESSURE: 80 MMHG | SYSTOLIC BLOOD PRESSURE: 130 MMHG | BODY MASS INDEX: 28.52 KG/M2 | HEART RATE: 61 BPM | WEIGHT: 155 LBS | RESPIRATION RATE: 16 BRPM

## 2018-03-09 DIAGNOSIS — E11.9 TYPE 2 DIABETES MELLITUS WITHOUT COMPLICATION, WITH LONG-TERM CURRENT USE OF INSULIN (HCC): ICD-10-CM

## 2018-03-09 DIAGNOSIS — I10 ESSENTIAL HYPERTENSION: Primary | ICD-10-CM

## 2018-03-09 DIAGNOSIS — Z79.4 TYPE 2 DIABETES MELLITUS WITHOUT COMPLICATION, WITH LONG-TERM CURRENT USE OF INSULIN (HCC): ICD-10-CM

## 2018-03-09 DIAGNOSIS — E78.2 MIXED HYPERLIPIDEMIA: ICD-10-CM

## 2018-03-09 PROCEDURE — 99212 OFFICE O/P EST SF 10 MIN: CPT | Performed by: INTERNAL MEDICINE

## 2018-03-09 PROCEDURE — 99214 OFFICE O/P EST MOD 30 MIN: CPT | Performed by: INTERNAL MEDICINE

## 2018-03-09 RX ORDER — PANTOPRAZOLE SODIUM 40 MG/1
40 TABLET, DELAYED RELEASE ORAL
COMMUNITY
Start: 2018-02-12 | End: 2018-08-08

## 2018-03-09 RX ORDER — ATORVASTATIN CALCIUM 10 MG/1
10 TABLET, FILM COATED ORAL NIGHTLY
Qty: 90 TABLET | Refills: 1 | Status: SHIPPED | OUTPATIENT
Start: 2018-03-09 | End: 2018-08-23

## 2018-03-09 NOTE — PROGRESS NOTES
HPI:    Patient ID: Irais Rincon is a 58year old female. Sugars are elevated. Lipid panel shows elevation in the triglycerides. Patient has not been watching her diet. She has not brought in her blood sugar records.   She tends not to eat nighttime worsening since onset. The problem is controlled. Pertinent negatives include no chest pain. There are no associated agents to hypertension.  Risk factors for coronary artery disease include diabetes mellitus, dyslipidemia, post-menopausal state, obesity an twice daily. Disp: 200 strip Rfl: 3   atorvastatin 10 MG Oral Tab Take 1 tablet (10 mg total) by mouth nightly. Disp: 90 tablet Rfl: 1   GLIMEPIRIDE 2 MG Oral Tab TAKE 1 TABLET BY MOUTH EVERY DAY WITH BREAKFAST.  Disp: 90 tablet Rfl: 2   DILTIAZEM HCL ER BE aspirin 81 MG Oral Tab Take 81 mg by mouth daily.  Disp:  Rfl:    Pantoprazole Sodium 40 MG Oral Tab EC  Disp:  Rfl:    Meclizine HCl 12.5 MG Oral Tab 1 tablet by mouth twice a day when necessary Disp: 30 tablet Rfl: 0     Allergies:No Known Allergies 155 lb (70.3 kg), not currently breastfeeding. Blood pressure was initially elevated upon arrival but did improve with recheck. Patient has been on diltiazem 180 mg once daily, losartan hydrochlorothiazide 100/12.5 once daily.   Renal functions have imp Oral Tab 90 tablet 1      Sig: Take 1 tablet (10 mg total) by mouth nightly.            Imaging & Referrals:  None       #[id number]

## 2018-03-09 NOTE — PATIENT INSTRUCTIONS
Problem List Items Addressed This Visit        Unprioritized    Essential hypertension - Primary     Blood pressure 130/80, pulse 61, resp. rate 16, height 5' 2\" (1.575 m), weight 155 lb (70.3 kg), not currently breastfeeding.     Blood pressure was initia

## 2018-03-09 NOTE — ASSESSMENT & PLAN NOTE
Lipid panel and liver function test discussed–patient has been on fenofibrate at 134 mg once daily. Lipid panel shows elevation in the LDL and triglyceride levels.   Will advised to start on Lipitor–10 mg 1 tablet once daily and addition to the fenofibrate

## 2018-03-09 NOTE — ASSESSMENT & PLAN NOTE
Blood pressure 130/80, pulse 61, resp. rate 16, height 5' 2\" (1.575 m), weight 155 lb (70.3 kg), not currently breastfeeding. Blood pressure was initially elevated upon arrival but did improve with recheck.   Patient has been on diltiazem 180 mg once da

## 2018-03-09 NOTE — ASSESSMENT & PLAN NOTE
Patient has been on NovoLog mix 55 units 2 times daily, glimepiride 2 mg 1 tablet daily with breakfast and metformin 500 mg 2 times daily.   She has had some low sugar reactions usually late at night but this is related to skipping her meals rather than ove

## 2018-04-13 PROBLEM — I70.0 ATHEROSCLEROSIS OF AORTIC ARCH (HCC): Status: ACTIVE | Noted: 2018-04-13

## 2018-04-13 PROBLEM — I70.0 ATHEROSCLEROSIS OF AORTIC ARCH: Status: ACTIVE | Noted: 2018-04-13

## 2018-05-08 ENCOUNTER — OFFICE VISIT (OUTPATIENT)
Dept: INTERNAL MEDICINE CLINIC | Facility: CLINIC | Age: 63
End: 2018-05-08

## 2018-05-08 VITALS
RESPIRATION RATE: 16 BRPM | BODY MASS INDEX: 27.97 KG/M2 | HEART RATE: 71 BPM | DIASTOLIC BLOOD PRESSURE: 89 MMHG | SYSTOLIC BLOOD PRESSURE: 162 MMHG | WEIGHT: 152 LBS | HEIGHT: 62 IN

## 2018-05-08 DIAGNOSIS — Z79.4 DIABETES MELLITUS TYPE 2, INSULIN DEPENDENT (HCC): ICD-10-CM

## 2018-05-08 DIAGNOSIS — E11.9 TYPE 2 DIABETES MELLITUS WITHOUT COMPLICATION, WITH LONG-TERM CURRENT USE OF INSULIN (HCC): ICD-10-CM

## 2018-05-08 DIAGNOSIS — E11.9 DIABETES MELLITUS TYPE 2, INSULIN DEPENDENT (HCC): ICD-10-CM

## 2018-05-08 DIAGNOSIS — I10 ESSENTIAL HYPERTENSION: Primary | ICD-10-CM

## 2018-05-08 DIAGNOSIS — E78.2 MIXED HYPERLIPIDEMIA: ICD-10-CM

## 2018-05-08 DIAGNOSIS — Z79.4 TYPE 2 DIABETES MELLITUS WITHOUT COMPLICATION, WITH LONG-TERM CURRENT USE OF INSULIN (HCC): ICD-10-CM

## 2018-05-08 PROCEDURE — 99214 OFFICE O/P EST MOD 30 MIN: CPT | Performed by: INTERNAL MEDICINE

## 2018-05-08 PROCEDURE — 99212 OFFICE O/P EST SF 10 MIN: CPT | Performed by: INTERNAL MEDICINE

## 2018-05-08 RX ORDER — LOSARTAN POTASSIUM AND HYDROCHLOROTHIAZIDE 25; 100 MG/1; MG/1
1 TABLET ORAL DAILY
Qty: 90 TABLET | Refills: 1 | Status: SHIPPED | OUTPATIENT
Start: 2018-05-08 | End: 2018-08-23

## 2018-05-08 RX ORDER — CLOTRIMAZOLE AND BETAMETHASONE DIPROPIONATE 10; .64 MG/G; MG/G
CREAM TOPICAL
Qty: 45 G | Refills: 3 | Status: SHIPPED | OUTPATIENT
Start: 2018-05-08 | End: 2019-08-12

## 2018-05-08 NOTE — ASSESSMENT & PLAN NOTE
Patient was started on atorvastatin at 10 mg once daily in addition to her fenofibrate at her last visit and advised to recheck labs prior to this visit. However she has not completed her labs.   She does not have any significant problems tolerating the me

## 2018-05-08 NOTE — ASSESSMENT & PLAN NOTE
Blood sugars continue to fluctuate but significant improvement in daytime–morning sugars at this time. Evening sugars continue to fluctuate as patient continues to eat badly off and on. She does have some low sugars late at night.   She is advised to redu

## 2018-05-08 NOTE — PROGRESS NOTES
HPI:    Patient ID: Frnacisco Acevedo is a 58year old female.     Patient is here with her blood sugar records–  A.m. sugars  169, 92, 78, 73, 93, 119, 85, 117, 68, 88, 84, 120, 68, 93, 182, 100, 81, 109, 109, 199.  P.m. sugars  92, 248, 83, 117, 73, 144, 255 normal. Exacerbating diseases include chronic renal disease. There are no known factors aggravating her hyperlipidemia. Pertinent negatives include no chest pain or myalgias.  Current antihyperlipidemic treatment includes diet change, exercise, fibric acid daily as directed Disp: 45 g Rfl: 3   Losartan Potassium-HCTZ 100-25 MG Oral Tab Take 1 tablet by mouth daily. Disp: 90 tablet Rfl: 1   Pantoprazole Sodium 40 MG Oral Tab EC  Disp:  Rfl:    Glucose Blood In Vitro Strip Test blood sugar twice daily.  Disp: 2 twice daily. Disp: 1 kit Rfl: 0   CETIRIZINE 10 MG Oral Tab TAKE 1 TABLET (10 MG TOTAL) BY MOUTH DAILY. Disp: 90 tablet Rfl: 0   aspirin 81 MG Oral Tab Take 81 mg by mouth daily.  Disp:  Rfl:    Meclizine HCl 12.5 MG Oral Tab 1 tablet by mouth twice a day w dependent ([address]     Blood sugars continue to fluctuate but significant improvement in daytime–morning sugars at this time. Evening sugars continue to fluctuate as patient continues to eat badly off and on. She does have some low sugars late at night.   Sh CBC WITH DIFFERENTIAL WITH PLATELET    COMP METABOLIC PANEL (14)    HEMOGLOBIN A1C    LIPID PANEL    URINALYSIS, ROUTINE    MICROALB/CREAT RATIO, RANDOM URINE          Return in about 4 weeks (around 6/5/2018).     PT UNDERSTANDS AND AGREES TO FOLLOW DIRECT

## 2018-05-08 NOTE — PATIENT INSTRUCTIONS
Problem List Items Addressed This Visit        Unprioritized    Diabetes mellitus type 2, insulin dependent ([facility] [address]     Blood sugars continue to fluctuate but significant improvement in daytime–morning sugars at this time.   Evening sugars continue to fluctua mellitus without complication, with long-term current use of insulin (HCC)        Relevant Orders    CBC WITH DIFFERENTIAL WITH PLATELET    COMP METABOLIC PANEL (14)    HEMOGLOBIN A1C    LIPID PANEL    URINALYSIS, ROUTINE    MICROALB/CREAT RATIO, RANDOM UR

## 2018-05-08 NOTE — ASSESSMENT & PLAN NOTE
Blood pressure (!) 162/89, pulse 71, resp. rate 16, height 5' 2\" (1.575 m), weight 152 lb (68.9 kg), not currently breastfeeding. Blood pressure continues to remain elevated. Upon recheck was 150/86.   Patient is currently on diltiazem at 180 mg once leslye

## 2018-05-09 NOTE — TELEPHONE ENCOUNTER
ONETOUCH DELICA LANCETS 42M  Qty 100  refills 2  Sig :  1 each by Does not apply route 2 (two) times daily.

## 2018-05-10 RX ORDER — LANCETS 33 GAUGE
1 EACH MISCELLANEOUS 2 TIMES DAILY
Qty: 100 EACH | Refills: 2 | Status: SHIPPED | OUTPATIENT
Start: 2018-05-10 | End: 2018-09-17

## 2018-05-10 NOTE — TELEPHONE ENCOUNTER
Refill Protocol Appointment Criteria  · Appointment scheduled in the past 12 months or in the next 3 months  Recent Outpatient Visits            2 days ago Essential hypertension    [address], [address]ad MD    Office Visit

## 2018-06-19 RX ORDER — HYDROXYZINE HYDROCHLORIDE 10 MG/1
TABLET, FILM COATED ORAL
Qty: 90 TABLET | Refills: 1 | Status: ON HOLD | OUTPATIENT
Start: 2018-06-19 | End: 2018-07-22

## 2018-06-28 ENCOUNTER — LAB ENCOUNTER (OUTPATIENT)
Dept: LAB | Age: 63
End: 2018-06-28
Attending: INTERNAL MEDICINE
Payer: COMMERCIAL

## 2018-06-28 DIAGNOSIS — Z79.4 TYPE 2 DIABETES MELLITUS WITHOUT COMPLICATION, WITH LONG-TERM CURRENT USE OF INSULIN (HCC): ICD-10-CM

## 2018-06-28 DIAGNOSIS — E11.9 TYPE 2 DIABETES MELLITUS WITHOUT COMPLICATION, WITH LONG-TERM CURRENT USE OF INSULIN (HCC): ICD-10-CM

## 2018-06-28 PROCEDURE — 82043 UR ALBUMIN QUANTITATIVE: CPT

## 2018-06-28 PROCEDURE — 82570 ASSAY OF URINE CREATININE: CPT

## 2018-06-28 PROCEDURE — 83036 HEMOGLOBIN GLYCOSYLATED A1C: CPT

## 2018-06-28 PROCEDURE — 85025 COMPLETE CBC W/AUTO DIFF WBC: CPT

## 2018-06-28 PROCEDURE — 81001 URINALYSIS AUTO W/SCOPE: CPT

## 2018-06-28 PROCEDURE — 80061 LIPID PANEL: CPT

## 2018-06-28 PROCEDURE — 80053 COMPREHEN METABOLIC PANEL: CPT

## 2018-06-28 PROCEDURE — 36415 COLL VENOUS BLD VENIPUNCTURE: CPT

## 2018-07-12 ENCOUNTER — NURSE TRIAGE (OUTPATIENT)
Dept: OTHER | Age: 63
End: 2018-07-12

## 2018-07-12 ENCOUNTER — HOSPITAL ENCOUNTER (OUTPATIENT)
Dept: GENERAL RADIOLOGY | Facility: HOSPITAL | Age: 63
Discharge: HOME | End: 2018-07-12
Attending: INTERNAL MEDICINE
Payer: COMMERCIAL

## 2018-07-12 ENCOUNTER — OFFICE VISIT (OUTPATIENT)
Dept: INTERNAL MEDICINE CLINIC | Facility: CLINIC | Age: 63
End: 2018-07-12

## 2018-07-12 VITALS
DIASTOLIC BLOOD PRESSURE: 64 MMHG | HEIGHT: 62 IN | TEMPERATURE: 98 F | OXYGEN SATURATION: 94 % | HEART RATE: 64 BPM | WEIGHT: 152.31 LBS | BODY MASS INDEX: 28.03 KG/M2 | RESPIRATION RATE: 20 BRPM | SYSTOLIC BLOOD PRESSURE: 134 MMHG

## 2018-07-12 DIAGNOSIS — M54.12 CERVICAL RADICULOPATHY: ICD-10-CM

## 2018-07-12 DIAGNOSIS — M54.12 CERVICAL RADICULOPATHY: Primary | ICD-10-CM

## 2018-07-12 PROCEDURE — 99212 OFFICE O/P EST SF 10 MIN: CPT | Performed by: INTERNAL MEDICINE

## 2018-07-12 PROCEDURE — 99215 OFFICE O/P EST HI 40 MIN: CPT | Performed by: INTERNAL MEDICINE

## 2018-07-12 PROCEDURE — 72050 X-RAY EXAM NECK SPINE 4/5VWS: CPT | Performed by: INTERNAL MEDICINE

## 2018-07-12 RX ORDER — PREDNISONE 1 MG/1
TABLET ORAL
Qty: 15 TABLET | Refills: 0 | Status: ON HOLD | OUTPATIENT
Start: 2018-07-12 | End: 2018-07-22

## 2018-07-12 RX ORDER — GABAPENTIN 100 MG/1
CAPSULE ORAL
Qty: 60 CAPSULE | Refills: 3 | Status: SHIPPED | OUTPATIENT
Start: 2018-07-12 | End: 2019-01-13

## 2018-07-12 NOTE — PATIENT INSTRUCTIONS
Problem List Items Addressed This Visit        Unprioritized    Cervical radiculopathy - Primary     Patient has had ongoing problems with neck and shoulder pain for a while.   Additionally she also has had a history of carpal tunnel syndrome and is status

## 2018-07-12 NOTE — TELEPHONE ENCOUNTER
Action Requested: Summary for Provider     []  Critical Lab, Recommendations Needed  [x] Need Additional Advice  []   FYI    []   Need Orders  [] Need Medications Sent to Pharmacy  []  Other     SUMMARY: Patient calling complaining of pain on right side of

## 2018-07-12 NOTE — ASSESSMENT & PLAN NOTE
Patient has had ongoing problems with neck and shoulder pain for a while. Additionally she also has had a history of carpal tunnel syndrome and is status post surgery for the right hand.   XR C-spine in 2015 did show mild bilateral neuroforaminal narrowing

## 2018-07-16 ENCOUNTER — HOSPITAL ENCOUNTER (OUTPATIENT)
Dept: MRI IMAGING | Age: 63
Discharge: HOME | End: 2018-07-16
Attending: INTERNAL MEDICINE
Payer: COMMERCIAL

## 2018-07-16 DIAGNOSIS — M54.12 CERVICAL RADICULOPATHY: ICD-10-CM

## 2018-07-16 PROCEDURE — 72141 MRI NECK SPINE W/O DYE: CPT | Performed by: INTERNAL MEDICINE

## 2018-07-21 ENCOUNTER — HOSPITAL ENCOUNTER (OUTPATIENT)
Facility: HOSPITAL | Age: 63
Setting detail: OBSERVATION
Discharge: HOME | End: 2018-07-23
Attending: EMERGENCY MEDICINE | Admitting: HOSPITALIST
Payer: COMMERCIAL

## 2018-07-21 DIAGNOSIS — R55 SYNCOPE AND COLLAPSE: Primary | ICD-10-CM

## 2018-07-21 LAB
ANION GAP SERPL CALC-SCNC: 9 MMOL/L (ref 0–18)
BASOPHILS # BLD: 0.1 K/UL (ref 0–0.2)
BASOPHILS NFR BLD: 1 %
BUN SERPL-MCNC: 27 MG/DL (ref 8–20)
BUN/CREAT SERPL: 22.1 (ref 10–20)
CALCIUM SERPL-MCNC: 8.8 MG/DL (ref 8.5–10.5)
CHLORIDE SERPL-SCNC: 102 MMOL/L (ref 95–110)
CO2 SERPL-SCNC: 26 MMOL/L (ref 22–32)
CREAT SERPL-MCNC: 1.22 MG/DL (ref 0.5–1.5)
EOSINOPHIL # BLD: 0.3 K/UL (ref 0–0.7)
EOSINOPHIL NFR BLD: 4 %
ERYTHROCYTE [DISTWIDTH] IN BLOOD BY AUTOMATED COUNT: 13.3 % (ref 11–15)
GLUCOSE BLDC GLUCOMTR-MCNC: 198 MG/DL (ref 70–99)
GLUCOSE SERPL-MCNC: 208 MG/DL (ref 70–99)
HCT VFR BLD AUTO: 37.6 % (ref 35–48)
HGB BLD-MCNC: 12.5 G/DL (ref 12–16)
LYMPHOCYTES # BLD: 2.3 K/UL (ref 1–4)
LYMPHOCYTES NFR BLD: 28 %
MAGNESIUM SERPL-MCNC: 1.7 MG/DL (ref 1.8–2.5)
MCH RBC QN AUTO: 28 PG (ref 27–32)
MCHC RBC AUTO-ENTMCNC: 33.3 G/DL (ref 32–37)
MCV RBC AUTO: 84 FL (ref 80–100)
MONOCYTES # BLD: 0.8 K/UL (ref 0–1)
MONOCYTES NFR BLD: 10 %
NEUTROPHILS # BLD AUTO: 4.7 K/UL (ref 1.8–7.7)
NEUTROPHILS NFR BLD: 57 %
OSMOLALITY UR CALC.SUM OF ELEC: 295 MOSM/KG (ref 275–295)
PLATELET # BLD AUTO: 338 K/UL (ref 140–400)
PMV BLD AUTO: 7.2 FL (ref 7.4–10.3)
POTASSIUM SERPL-SCNC: 3.8 MMOL/L (ref 3.3–5.1)
RBC # BLD AUTO: 4.47 M/UL (ref 3.7–5.4)
SODIUM SERPL-SCNC: 137 MMOL/L (ref 136–144)
TROPONIN I SERPL-MCNC: 0.01 NG/ML (ref ?–0.03)
WBC # BLD AUTO: 8.2 K/UL (ref 4–11)

## 2018-07-21 PROCEDURE — 99219 INITIAL OBSERVATION CARE,LEVL II: CPT | Performed by: HOSPITALIST

## 2018-07-21 RX ORDER — MAGNESIUM SULFATE HEPTAHYDRATE 40 MG/ML
2 INJECTION, SOLUTION INTRAVENOUS ONCE
Status: COMPLETED | OUTPATIENT
Start: 2018-07-21 | End: 2018-07-22

## 2018-07-22 LAB
ANION GAP SERPL CALC-SCNC: 5 MMOL/L (ref 0–18)
BASOPHILS # BLD: 0.1 K/UL (ref 0–0.2)
BASOPHILS NFR BLD: 1 %
BUN SERPL-MCNC: 18 MG/DL (ref 8–20)
BUN/CREAT SERPL: 19.6 (ref 10–20)
CALCIUM SERPL-MCNC: 8.2 MG/DL (ref 8.5–10.5)
CHLORIDE SERPL-SCNC: 107 MMOL/L (ref 95–110)
CO2 SERPL-SCNC: 29 MMOL/L (ref 22–32)
CREAT SERPL-MCNC: 0.92 MG/DL (ref 0.5–1.5)
EOSINOPHIL # BLD: 0.3 K/UL (ref 0–0.7)
EOSINOPHIL NFR BLD: 5 %
ERYTHROCYTE [DISTWIDTH] IN BLOOD BY AUTOMATED COUNT: 13.6 % (ref 11–15)
GLUCOSE BLDC GLUCOMTR-MCNC: 109 MG/DL (ref 70–99)
GLUCOSE BLDC GLUCOMTR-MCNC: 117 MG/DL (ref 70–99)
GLUCOSE BLDC GLUCOMTR-MCNC: 155 MG/DL (ref 70–99)
GLUCOSE BLDC GLUCOMTR-MCNC: 200 MG/DL (ref 70–99)
GLUCOSE BLDC GLUCOMTR-MCNC: 97 MG/DL (ref 70–99)
GLUCOSE SERPL-MCNC: 122 MG/DL (ref 70–99)
HBA1C MFR BLD: 7.6 % (ref 4–6)
HCT VFR BLD AUTO: 36.8 % (ref 35–48)
HGB BLD-MCNC: 12 G/DL (ref 12–16)
LYMPHOCYTES # BLD: 1.7 K/UL (ref 1–4)
LYMPHOCYTES NFR BLD: 30 %
MAGNESIUM SERPL-MCNC: 2.2 MG/DL (ref 1.8–2.5)
MCH RBC QN AUTO: 27.5 PG (ref 27–32)
MCHC RBC AUTO-ENTMCNC: 32.5 G/DL (ref 32–37)
MCV RBC AUTO: 84.4 FL (ref 80–100)
MONOCYTES # BLD: 0.5 K/UL (ref 0–1)
MONOCYTES NFR BLD: 10 %
NEUTROPHILS # BLD AUTO: 3 K/UL (ref 1.8–7.7)
NEUTROPHILS NFR BLD: 54 %
OSMOLALITY UR CALC.SUM OF ELEC: 295 MOSM/KG (ref 275–295)
PLATELET # BLD AUTO: 320 K/UL (ref 140–400)
PMV BLD AUTO: 7.4 FL (ref 7.4–10.3)
POTASSIUM SERPL-SCNC: 3.8 MMOL/L (ref 3.3–5.1)
RBC # BLD AUTO: 4.35 M/UL (ref 3.7–5.4)
SODIUM SERPL-SCNC: 141 MMOL/L (ref 136–144)
WBC # BLD AUTO: 5.6 K/UL (ref 4–11)

## 2018-07-22 PROCEDURE — 99225 SUBSEQUENT OBSERVATION CARE: CPT | Performed by: HOSPITALIST

## 2018-07-22 RX ORDER — PANTOPRAZOLE SODIUM 40 MG/1
40 TABLET, DELAYED RELEASE ORAL
Status: DISCONTINUED | OUTPATIENT
Start: 2018-07-22 | End: 2018-07-23

## 2018-07-22 RX ORDER — SODIUM CHLORIDE 9 MG/ML
INJECTION, SOLUTION INTRAVENOUS CONTINUOUS
Status: DISCONTINUED | OUTPATIENT
Start: 2018-07-22 | End: 2018-07-23

## 2018-07-22 RX ORDER — POTASSIUM CHLORIDE 20 MEQ/1
40 TABLET, EXTENDED RELEASE ORAL ONCE
Status: COMPLETED | OUTPATIENT
Start: 2018-07-22 | End: 2018-07-22

## 2018-07-22 RX ORDER — ACETAMINOPHEN 325 MG/1
650 TABLET ORAL EVERY 6 HOURS PRN
Status: DISCONTINUED | OUTPATIENT
Start: 2018-07-22 | End: 2018-07-23

## 2018-07-22 RX ORDER — LEVOTHYROXINE SODIUM 0.05 MG/1
50 TABLET ORAL
Status: DISCONTINUED | OUTPATIENT
Start: 2018-07-22 | End: 2018-07-23

## 2018-07-22 RX ORDER — ASPIRIN 81 MG/1
81 TABLET ORAL DAILY
Status: DISCONTINUED | OUTPATIENT
Start: 2018-07-22 | End: 2018-07-23

## 2018-07-22 RX ORDER — GABAPENTIN 100 MG/1
100 CAPSULE ORAL 2 TIMES DAILY
Status: DISCONTINUED | OUTPATIENT
Start: 2018-07-22 | End: 2018-07-23

## 2018-07-22 RX ORDER — HEPARIN SODIUM 5000 [USP'U]/ML
5000 INJECTION, SOLUTION INTRAVENOUS; SUBCUTANEOUS EVERY 12 HOURS
Status: DISCONTINUED | OUTPATIENT
Start: 2018-07-22 | End: 2018-07-22

## 2018-07-22 RX ORDER — HEPARIN SODIUM 5000 [USP'U]/ML
5000 INJECTION, SOLUTION INTRAVENOUS; SUBCUTANEOUS EVERY 12 HOURS SCHEDULED
Status: DISCONTINUED | OUTPATIENT
Start: 2018-07-22 | End: 2018-07-23

## 2018-07-22 RX ORDER — 0.9 % SODIUM CHLORIDE 0.9 %
VIAL (ML) INJECTION
Status: COMPLETED
Start: 2018-07-22 | End: 2018-07-22

## 2018-07-22 RX ORDER — FENOFIBRATE 134 MG/1
134 CAPSULE ORAL
Status: DISCONTINUED | OUTPATIENT
Start: 2018-07-22 | End: 2018-07-23

## 2018-07-22 RX ORDER — ONDANSETRON 2 MG/ML
4 INJECTION INTRAMUSCULAR; INTRAVENOUS EVERY 6 HOURS PRN
Status: DISCONTINUED | OUTPATIENT
Start: 2018-07-22 | End: 2018-07-23

## 2018-07-22 RX ORDER — ESCITALOPRAM OXALATE 10 MG/1
5 TABLET ORAL DAILY
Status: DISCONTINUED | OUTPATIENT
Start: 2018-07-22 | End: 2018-07-23

## 2018-07-22 RX ORDER — DILTIAZEM HYDROCHLORIDE 180 MG/1
180 CAPSULE, COATED, EXTENDED RELEASE ORAL DAILY
Status: DISCONTINUED | OUTPATIENT
Start: 2018-07-22 | End: 2018-07-23

## 2018-07-22 RX ORDER — DEXTROSE MONOHYDRATE 25 G/50ML
50 INJECTION, SOLUTION INTRAVENOUS AS NEEDED
Status: DISCONTINUED | OUTPATIENT
Start: 2018-07-22 | End: 2018-07-23

## 2018-07-22 RX ORDER — ATORVASTATIN CALCIUM 10 MG/1
10 TABLET, FILM COATED ORAL NIGHTLY
Status: DISCONTINUED | OUTPATIENT
Start: 2018-07-22 | End: 2018-07-23

## 2018-07-22 NOTE — H&P
[facility]    History & Physical    Yoselyn Alfonso Patient Status:  Emergency    1955 MRN [id number]   Location [address] Attending Stacey Ortiz MD   Hosp Day # 0 PCP Negro Jacobs MD     Date:   NG  : ENDOMETRIAL BX CONJUNCT W/COLPOSCOPY  1991: LAPAROSCOPIC CHOLECYSTECTOMY  2011: UPPER GI ENDOSCOPY,BIOPSY      Comment: EGD with Biopsy  Family History   Problem Relation Age of Onset   • Diabetes Father    • Heart Disease Father      C No No   Sig: Take 1 tablet by mouth daily.    METFORMIN HCL  MG Oral Tablet 24 Hr   No No   Sig: TAKE 2 TABLETS BY MOUTH EVERY DAY WITH EVENING MEAL   Meclizine HCl 12.5 MG Oral Tab   No No   Si tablet by mouth twice a day when necessary   NOVOL movements are intact, Normal conjunctiva. HENT:  Normocephalic, oral mucosa is moist.  Head:  Normocephalic, atraumatic. Neck:  Supple, non-tender, no carotid bruit, no jugular venous distention, no lymphadenopathy, no thyromegaly.   Respiratory:  Lungs a

## 2018-07-22 NOTE — PLAN OF CARE
Problem: Patient/Family Goals  Goal: Patient/Family Long Term Goal  Patient's Long Term Goal: Return home healthy. Interventions:  - Plan for echo. - Monitor vitals  - Assess for dizziness.   - See additional Care Plan goals for specific interventions

## 2018-07-22 NOTE — ED PROVIDER NOTES
Patient Seen in: [facility] AND [facility] Emergency Department    History   Patient presents with:  Dizziness (neurologic)    Stated Complaint: weakness and dizziness     HPI    Patient is a 49-year-old female who presents with syncopal episode that occurred im systems are as noted in HPI. Constitutional and vital signs reviewed. All other systems reviewed and negative except as noted above.     Physical Exam   ED Triage Vitals [07/21/18 2109]  BP: 137/81  Pulse: 66  Resp: 18  Temp: 98.1 °F (36.7 °C)  Temp s Please view results for these tests on the individual orders.    RAINBOW DRAW BLUE   RAINBOW DRAW LAVENDER   RAINBOW DRAW DARK GREEN   RAINBOW DRAW LIGHT GREEN   RAINBOW DRAW GOLD   RAINBOW DRAW LAVENDER TALL (BNP)     EKG    Rate, intervals

## 2018-07-22 NOTE — PROGRESS NOTES
[facility]  Progress Note     Angelina You  : 1955    Status: Observation  Day #: 0    Attending: Chary Husain MD  PCP: Mary Carmen Ariza MD      Assessment and Plan     Syncope, suspect vasovagal  -echo pending  -EKG normal  -monitor t aspirin  81 mg Oral Daily   • atorvastatin  10 mg Oral Nightly   • DilTIAZem HCl ER Coated Beads  180 mg Oral Daily   • escitalopram  5 mg Oral Daily   • fenofibrate micronized  134 mg Oral Daily with breakfast   • gabapentin  100 mg Oral BID   • Levothyro

## 2018-07-22 NOTE — ED INITIAL ASSESSMENT (HPI)
Pt received via EMS for eval of dizziness and weakness. Pt states she was at [employer] this evening when she suddenly became dizzy and felt weak while sitting.

## 2018-07-23 ENCOUNTER — APPOINTMENT (OUTPATIENT)
Dept: CV DIAGNOSTICS | Facility: HOSPITAL | Age: 63
End: 2018-07-23
Attending: HOSPITALIST
Payer: COMMERCIAL

## 2018-07-23 VITALS
BODY MASS INDEX: 28.09 KG/M2 | DIASTOLIC BLOOD PRESSURE: 92 MMHG | OXYGEN SATURATION: 97 % | SYSTOLIC BLOOD PRESSURE: 158 MMHG | TEMPERATURE: 98 F | RESPIRATION RATE: 16 BRPM | HEART RATE: 64 BPM | WEIGHT: 152.63 LBS | HEIGHT: 62 IN

## 2018-07-23 LAB
GLUCOSE BLDC GLUCOMTR-MCNC: 178 MG/DL (ref 70–99)
GLUCOSE BLDC GLUCOMTR-MCNC: 198 MG/DL (ref 70–99)
POTASSIUM SERPL-SCNC: 4.3 MMOL/L (ref 3.3–5.1)

## 2018-07-23 PROCEDURE — 93306 TTE W/DOPPLER COMPLETE: CPT | Performed by: HOSPITALIST

## 2018-07-23 PROCEDURE — 99217 OBSERVATION CARE DISCHARGE: CPT | Performed by: HOSPITALIST

## 2018-07-23 NOTE — PLAN OF CARE
CARDIOVASCULAR - ADULT    • Maintains optimal cardiac output and hemodynamic stability Progressing    • Absence of cardiac arrhythmias or at baseline Progressing        2decho

## 2018-07-23 NOTE — PLAN OF CARE
Problem: Patient Centered Care  Goal: Patient preferences are identified and integrated in the patient's plan of care  Interventions:  - What would you like us to know as we care for you?  I live at home with my family and would like to go home as soon as p stability  - Monitor arterial and/or venous puncture sites for bleeding and/or hematoma  - Assess quality of pulses, skin color and temperature  - Assess for signs of decreased coronary artery perfusion - ex.  Angina  - Evaluate fluid balance, assess for ed

## 2018-07-23 NOTE — PLAN OF CARE
Patient discharged on 7/23 at 3:41 pm. Patient alert and oriented. Belongings gathered and sent with patient. Discharge instructions given. No new prescriptions given. Tele monitor and IV removed. Patient discharged home.

## 2018-07-24 ENCOUNTER — TELEPHONE (OUTPATIENT)
Dept: INTERNAL MEDICINE CLINIC | Facility: CLINIC | Age: 63
End: 2018-07-24

## 2018-07-24 ENCOUNTER — OFFICE VISIT (OUTPATIENT)
Dept: INTERNAL MEDICINE CLINIC | Facility: CLINIC | Age: 63
End: 2018-07-24

## 2018-07-24 VITALS
DIASTOLIC BLOOD PRESSURE: 75 MMHG | HEIGHT: 62 IN | WEIGHT: 152 LBS | HEART RATE: 61 BPM | BODY MASS INDEX: 27.97 KG/M2 | SYSTOLIC BLOOD PRESSURE: 138 MMHG | RESPIRATION RATE: 16 BRPM

## 2018-07-24 DIAGNOSIS — Z79.4 DIABETES MELLITUS TYPE 2, INSULIN DEPENDENT (HCC): ICD-10-CM

## 2018-07-24 DIAGNOSIS — E11.9 DIABETES MELLITUS TYPE 2, INSULIN DEPENDENT (HCC): ICD-10-CM

## 2018-07-24 DIAGNOSIS — M54.12 CERVICAL RADICULOPATHY: ICD-10-CM

## 2018-07-24 DIAGNOSIS — I10 ESSENTIAL HYPERTENSION: ICD-10-CM

## 2018-07-24 DIAGNOSIS — R55 SYNCOPE AND COLLAPSE: Primary | ICD-10-CM

## 2018-07-24 DIAGNOSIS — G47.30 SLEEP APNEA, UNSPECIFIED TYPE: Primary | ICD-10-CM

## 2018-07-24 DIAGNOSIS — E83.42 HYPOMAGNESEMIA: ICD-10-CM

## 2018-07-24 PROCEDURE — 99212 OFFICE O/P EST SF 10 MIN: CPT | Performed by: INTERNAL MEDICINE

## 2018-07-24 PROCEDURE — 99214 OFFICE O/P EST MOD 30 MIN: CPT | Performed by: INTERNAL MEDICINE

## 2018-07-24 NOTE — ASSESSMENT & PLAN NOTE
MRI of the C-spine discussed. She is advised to complete EMG and then follow-up with Dr. Hao Flores for an evaluation. She may benefit from a local VAHID to manage her symptoms. Continue on gabapentin as this seems to have helped.   She has completed her cour

## 2018-07-24 NOTE — PATIENT INSTRUCTIONS
Problem List Items Addressed This Visit        Unprioritized    Cervical radiculopathy     MRI of the C-spine discussed. She is advised to complete EMG and then follow-up with Dr. Hao Flores for an evaluation.   She may benefit from a local VAHID to manage her for sleep apnea and has been using a CPAP on a regular basis.            Relevant Orders    MAGNESIUM      Other Visit Diagnoses     Hypomagnesemia        Relevant Orders    MAGNESIUM

## 2018-07-24 NOTE — DISCHARGE SUMMARY
[facility]    Discharge Summary    Mónica Vanegas Patient Status:  Observation    1955 MRN [id number]   [facility] 3W/SW Attending No att. providers found   [facility] Day # 0 PCP John Han MD     Date of Admission: nonfocal      Reason for Admission:   Dizziness    History of Present Illness:     Andrea Amezcua is a(n) 58year old female, with a past medical history significant for diabetes, hypertension and hyperlipidemia was brought to the ER after she experienced Quantity:  100 each  Refills:  2     cetirizine 10 MG Tabs  Commonly known as:  ZYRTEC      TAKE 1 TABLET (10 MG TOTAL) BY MOUTH DAILY.    Quantity:  90 tablet  Refills:  0     clotrimazole-betamethasone 1-0.05 % Crea  Commonly known as:  LOTRISONE      Ext Quantity:  1 kit  Refills:  0     Pantoprazole Sodium 40 MG Tbec  Commonly known as:  PROTONIX      Take 40 mg by mouth every morning before breakfast.   Refills:  0            Follow up Visits: Follow-up Information     Etelvina Spencer MD In 1 week.

## 2018-07-24 NOTE — PROGRESS NOTES
HPI:    Patient ID: Adri Perales is a 58year old female. MRI C SPINE       C4-C5: Mild central stenosis.  Left greater than right facet arthropathy resulting in mild to moderate left greater than right foraminal narrowing.     Additional multilevel de vasovagal.     Contributing factors of humidity and relative dehydration.  - given IVF  - echo with normal EF   -EKG normal  - monitor tele - ok thus far     DM with associated neuropathy     Hypomag  -repleted per protocol     DVT proph:  heparin sq     D Positive for neck pain and neck stiffness. Skin: Negative. Allergic/Immunologic: Negative. Neurological: Positive for syncope, light-headedness and numbness. Hematological: Negative. Psychiatric/Behavioral: Negative.                Current Outp w/Device Does not apply Kit Test blood sugar twice daily. Disp: 1 kit Rfl: 0   CETIRIZINE 10 MG Oral Tab TAKE 1 TABLET (10 MG TOTAL) BY MOUTH DAILY. Disp: 90 tablet Rfl: 0   aspirin 81 MG Oral Tab Take 81 mg by mouth daily.  Disp:  Rfl:    LEVOTHYROXINE SOD She exhibits normal muscle tone. Coordination normal.   Skin: No rash noted. No erythema. Psychiatric: She has a normal mood and affect. Her behavior is normal. Thought content normal.   Nursing note and vitals reviewed.              ASSESSMENT/PLAN: sugars were 196. Blood pressures seem stable. Only abnormality noted was hypomagnesemia which has been corrected. Will recheck this prior to her next office visit. Echocardiogram did not show any significant abnormalities. Pulmonary pressures were 33.

## 2018-07-24 NOTE — ASSESSMENT & PLAN NOTE
Blood pressure 138/75, pulse 61, resp. rate 16, height 5' 2\" (1.575 m), weight 152 lb (68.9 kg), not currently breastfeeding. Stable blood pressure, well controlled on losartan hydrochlorothiazide 20/25 1 tablet daily and diltiazem 180 mg daily.   Advised

## 2018-07-24 NOTE — TELEPHONE ENCOUNTER
Received a fax from [address] [place] [address] for CPAP supplies.   I put order in and will fax to [facility] andrew Shin

## 2018-07-24 NOTE — ASSESSMENT & PLAN NOTE
Patient has not brought in her blood sugar records. She has not had any low sugar reactions however.   She is currently on NovoLog mix 45 units in the evening and 55 units in the morning with metformin 500 mg 1 tablet twice daily and glimepiride 2 mg 1 tab

## 2018-07-24 NOTE — ASSESSMENT & PLAN NOTE
Recent episode of syncope and had warning symptoms of lightheadedness and sweats prior to passing out. Blood sugars were 196. Blood pressures seem stable. Only abnormality noted was hypomagnesemia which has been corrected.   Will recheck this prior to he

## 2018-07-26 RX ORDER — LEVOTHYROXINE SODIUM 0.05 MG/1
TABLET ORAL
Qty: 90 TABLET | Refills: 1 | Status: SHIPPED | OUTPATIENT
Start: 2018-07-26 | End: 2019-02-04

## 2018-07-26 RX ORDER — LOSARTAN POTASSIUM AND HYDROCHLOROTHIAZIDE 12.5; 1 MG/1; MG/1
1 TABLET ORAL
Qty: 90 TABLET | Refills: 1 | Status: SHIPPED | OUTPATIENT
Start: 2018-07-26 | End: 2018-08-23

## 2018-07-27 ENCOUNTER — LAB ENCOUNTER (OUTPATIENT)
Dept: LAB | Age: 63
End: 2018-07-27
Attending: INTERNAL MEDICINE
Payer: COMMERCIAL

## 2018-07-27 ENCOUNTER — TELEPHONE (OUTPATIENT)
Dept: INTERNAL MEDICINE CLINIC | Facility: CLINIC | Age: 63
End: 2018-07-27

## 2018-07-27 DIAGNOSIS — Z79.4 DIABETES MELLITUS TYPE 2, INSULIN DEPENDENT (HCC): ICD-10-CM

## 2018-07-27 DIAGNOSIS — E83.42 HYPOMAGNESEMIA: ICD-10-CM

## 2018-07-27 DIAGNOSIS — E11.9 DIABETES MELLITUS TYPE 2, INSULIN DEPENDENT (HCC): ICD-10-CM

## 2018-07-27 DIAGNOSIS — R55 SYNCOPE AND COLLAPSE: ICD-10-CM

## 2018-07-27 LAB
ALBUMIN SERPL BCP-MCNC: 3.8 G/DL (ref 3.5–4.8)
ALBUMIN/GLOB SERPL: 1.4 {RATIO} (ref 1–2)
ALP SERPL-CCNC: 37 U/L (ref 32–100)
ALT SERPL-CCNC: 30 U/L (ref 14–54)
ANION GAP SERPL CALC-SCNC: 9 MMOL/L (ref 0–18)
AST SERPL-CCNC: 27 U/L (ref 15–41)
BASOPHILS # BLD: 0.1 K/UL (ref 0–0.2)
BASOPHILS NFR BLD: 1 %
BILIRUB SERPL-MCNC: 0.5 MG/DL (ref 0.3–1.2)
BUN SERPL-MCNC: 20 MG/DL (ref 8–20)
BUN/CREAT SERPL: 18.9 (ref 10–20)
CALCIUM SERPL-MCNC: 9 MG/DL (ref 8.5–10.5)
CHLORIDE SERPL-SCNC: 100 MMOL/L (ref 95–110)
CO2 SERPL-SCNC: 28 MMOL/L (ref 22–32)
CREAT SERPL-MCNC: 1.06 MG/DL (ref 0.5–1.5)
EOSINOPHIL # BLD: 0.3 K/UL (ref 0–0.7)
EOSINOPHIL NFR BLD: 4 %
ERYTHROCYTE [DISTWIDTH] IN BLOOD BY AUTOMATED COUNT: 13.4 % (ref 11–15)
GLOBULIN PLAS-MCNC: 2.8 G/DL (ref 2.5–3.7)
GLUCOSE SERPL-MCNC: 87 MG/DL (ref 70–99)
HCT VFR BLD AUTO: 39.7 % (ref 35–48)
HGB BLD-MCNC: 12.8 G/DL (ref 12–16)
LYMPHOCYTES # BLD: 1.6 K/UL (ref 1–4)
LYMPHOCYTES NFR BLD: 26 %
MAGNESIUM SERPL-MCNC: 1.6 MG/DL (ref 1.8–2.5)
MCH RBC QN AUTO: 27.3 PG (ref 27–32)
MCHC RBC AUTO-ENTMCNC: 32.4 G/DL (ref 32–37)
MCV RBC AUTO: 84.4 FL (ref 80–100)
MONOCYTES # BLD: 0.8 K/UL (ref 0–1)
MONOCYTES NFR BLD: 12 %
NEUTROPHILS # BLD AUTO: 3.7 K/UL (ref 1.8–7.7)
NEUTROPHILS NFR BLD: 57 %
OSMOLALITY UR CALC.SUM OF ELEC: 286 MOSM/KG (ref 275–295)
PATIENT FASTING: NO
PLATELET # BLD AUTO: 323 K/UL (ref 140–400)
PMV BLD AUTO: 7.6 FL (ref 7.4–10.3)
POTASSIUM SERPL-SCNC: 3.8 MMOL/L (ref 3.3–5.1)
PROT SERPL-MCNC: 6.6 G/DL (ref 5.9–8.4)
RBC # BLD AUTO: 4.7 M/UL (ref 3.7–5.4)
SODIUM SERPL-SCNC: 137 MMOL/L (ref 136–144)
WBC # BLD AUTO: 6.4 K/UL (ref 4–11)

## 2018-07-27 PROCEDURE — 36415 COLL VENOUS BLD VENIPUNCTURE: CPT

## 2018-07-27 PROCEDURE — 83735 ASSAY OF MAGNESIUM: CPT

## 2018-07-27 PROCEDURE — 80053 COMPREHEN METABOLIC PANEL: CPT

## 2018-07-27 PROCEDURE — 85025 COMPLETE CBC W/AUTO DIFF WBC: CPT

## 2018-07-27 NOTE — TELEPHONE ENCOUNTER
Hi Dr. Jeancarlos Cornelius,     Patient tried to schedule the EMG your ordered. However, Neurology requires a referral and order for the EMGs. Please enter the DX and sign referral. Also, I don't see the order for the EMG, can you enter one? Thank you,   Eduardo Brar.   [facility]

## 2018-08-08 RX ORDER — PANTOPRAZOLE SODIUM 40 MG/1
40 TABLET, DELAYED RELEASE ORAL
Qty: 90 TABLET | Refills: 1 | Status: SHIPPED | OUTPATIENT
Start: 2018-08-08 | End: 2019-02-14

## 2018-08-08 NOTE — TELEPHONE ENCOUNTER
Pending Prescriptions Disp Refills    PANTOPRAZOLE SODIUM 40 MG Oral Tab EC [Pharmacy Med Name: PANTOPRAZOLE SOD DR 40 MG TAB] 90 tablet 3     Sig: TAKE 1 TABLET (40 MG TOTAL) BY MOUTH EVERY MORNING BEFORE BREAKFAST.          LOV 7/14/17  LR 2/12/18    em

## 2018-08-08 NOTE — TELEPHONE ENCOUNTER
GI staff:please have patient make appointment to see me in clinic (non-urgent). I refilled this medication now, but cannot provide future refills on medications unless he sees me in clinic, thanks.      \

## 2018-08-23 ENCOUNTER — OFFICE VISIT (OUTPATIENT)
Dept: INTERNAL MEDICINE CLINIC | Facility: CLINIC | Age: 63
End: 2018-08-23

## 2018-08-23 VITALS
DIASTOLIC BLOOD PRESSURE: 70 MMHG | SYSTOLIC BLOOD PRESSURE: 136 MMHG | HEIGHT: 62 IN | WEIGHT: 152.38 LBS | RESPIRATION RATE: 20 BRPM | TEMPERATURE: 98 F | BODY MASS INDEX: 28.04 KG/M2 | OXYGEN SATURATION: 96 % | HEART RATE: 66 BPM

## 2018-08-23 DIAGNOSIS — M54.12 CERVICAL RADICULOPATHY: Primary | ICD-10-CM

## 2018-08-23 DIAGNOSIS — E11.9 DIABETES MELLITUS TYPE 2, INSULIN DEPENDENT (HCC): ICD-10-CM

## 2018-08-23 DIAGNOSIS — Z79.4 DIABETES MELLITUS TYPE 2, INSULIN DEPENDENT (HCC): ICD-10-CM

## 2018-08-23 DIAGNOSIS — E83.42 HYPOMAGNESEMIA: ICD-10-CM

## 2018-08-23 DIAGNOSIS — I10 ESSENTIAL HYPERTENSION: ICD-10-CM

## 2018-08-23 PROCEDURE — 99214 OFFICE O/P EST MOD 30 MIN: CPT | Performed by: INTERNAL MEDICINE

## 2018-08-23 PROCEDURE — 99212 OFFICE O/P EST SF 10 MIN: CPT | Performed by: INTERNAL MEDICINE

## 2018-08-23 RX ORDER — MAGNESIUM 200 MG
TABLET ORAL
Qty: 30 TABLET | Refills: 3 | Status: SHIPPED | OUTPATIENT
Start: 2018-08-23 | End: 2019-08-12

## 2018-08-23 RX ORDER — LOSARTAN POTASSIUM AND HYDROCHLOROTHIAZIDE 25; 100 MG/1; MG/1
1 TABLET ORAL DAILY
Qty: 90 TABLET | Refills: 1 | Status: SHIPPED | OUTPATIENT
Start: 2018-08-23 | End: 2019-05-15

## 2018-08-23 RX ORDER — ATORVASTATIN CALCIUM 10 MG/1
10 TABLET, FILM COATED ORAL NIGHTLY
Qty: 90 TABLET | Refills: 1 | Status: SHIPPED | OUTPATIENT
Start: 2018-08-23 | End: 2019-03-25

## 2018-08-23 NOTE — ASSESSMENT & PLAN NOTE
Blood pressure 136/70, pulse 66, temperature 97.9 °F (36.6 °C), temperature source Oral, resp. rate 20, height 5' 2\" (1.575 m), weight 152 lb 6.4 oz (69.1 kg), SpO2 96 %, not currently breastfeeding.   Stable blood pressure, well controlled on losartan hyd

## 2018-08-23 NOTE — PROGRESS NOTES
HPI:    Patient ID: Pedro Dunham is a 58year old female. Labs completed recently-magnesium levels are low    Mri c spine    CONCLUSION:      C4-C5: Mild central stenosis.  Left greater than right facet arthropathy resulting in mild to moderate left gr hypertension. Risk factors for coronary artery disease include diabetes mellitus, dyslipidemia, post-menopausal state, obesity and sedentary lifestyle.  Past treatments include angiotensin blockers, diuretics, lifestyle changes and calcium channel blockers current. Hyperlipidemia   This is a chronic problem. The current episode started more than 1 year ago. The problem is controlled. Recent lipid tests were reviewed and are normal. Exacerbating diseases include chronic renal disease.  There are no known fac as directed Disp: 45 g Rfl: 3   Glucose Blood In Vitro Strip Test blood sugar twice daily. Disp: 200 strip Rfl: 3   GLIMEPIRIDE 2 MG Oral Tab TAKE 1 TABLET BY MOUTH EVERY DAY WITH BREAKFAST.  Disp: 90 tablet Rfl: 2   DILTIAZEM HCL ER BEADS 180 MG Oral Capsu oropharyngeal exudate. Eyes: Conjunctivae and EOM are normal. Pupils are equal, round, and reactive to light. Right eye exhibits no discharge. Left eye exhibits no discharge. Neck: Normal range of motion. Neck supple. No JVD present.  No thyromegaly pre Vergia Samples. She has not done the testing orders set up appointment as yet. Advised to call ASAP. Continued on gabapentin.   Will hold off further treatment with steroids until tests are completed         Relevant Orders    EMG ([address] Magnesium 200 MG Oral Tab 30 tablet 3      Si tablet p.o. daily           Imaging & Referrals:  OPTOMETRY - INTERNAL       MV#[id number]

## 2018-08-23 NOTE — ASSESSMENT & PLAN NOTE
MRI of the C-spine has been discussed. She was advised to complete the EMG and follow-up with Dr. Last Francisco. She has not done the testing orders set up appointment as yet. Advised to call ASAP. Continued on gabapentin.   Will hold off further treatment wi

## 2018-08-23 NOTE — PATIENT INSTRUCTIONS
Problem List Items Addressed This Visit        Unprioritized    Cervical radiculopathy - Primary     MRI of the C-spine has been discussed. She was advised to complete the EMG and follow-up with Dr. Terri Carlos.   She has not done the testing orders set up abram Losartan Potassium-HCTZ 100-25 MG Oral Tab    Hypomagnesemia     Advised to start on magnesium supplements–200 mg daily and recheck labs with the next blood draw .          Relevant Orders    MAGNESIUM

## 2018-08-23 NOTE — ASSESSMENT & PLAN NOTE
Blood sugars–not available at the office visit today. She is currently on NovoLog mix 25 units in the evening and 50 5 in the morning.   Additionally on metformin 5 mg 1 tablet 2 times daily and glimepiride 2 mg 1 tablet in the morning and half a tablet in

## 2018-09-19 RX ORDER — LANCETS 33 GAUGE
1 EACH MISCELLANEOUS 2 TIMES DAILY
Qty: 200 EACH | Refills: 3 | Status: SHIPPED | OUTPATIENT
Start: 2018-09-19

## 2018-09-19 RX ORDER — METFORMIN HYDROCHLORIDE 500 MG/1
TABLET, EXTENDED RELEASE ORAL
Qty: 180 TABLET | Refills: 1 | Status: SHIPPED | OUTPATIENT
Start: 2018-09-19 | End: 2019-03-25

## 2018-09-20 ENCOUNTER — PROCEDURE VISIT (OUTPATIENT)
Dept: NEUROLOGY | Facility: CLINIC | Age: 63
End: 2018-09-20

## 2018-09-20 VITALS — WEIGHT: 152 LBS | HEIGHT: 60 IN | BODY MASS INDEX: 29.84 KG/M2

## 2018-09-20 DIAGNOSIS — M54.12 CERVICAL RADICULOPATHY: ICD-10-CM

## 2018-09-20 PROCEDURE — 95886 MUSC TEST DONE W/N TEST COMP: CPT | Performed by: OTHER

## 2018-09-20 PROCEDURE — 95912 NRV CNDJ TEST 11-12 STUDIES: CPT | Performed by: OTHER

## 2018-09-20 NOTE — PROCEDURES
HISTORY:    Dennis Hassan is a 58year old right handed female with a complaint of numbness and pain in both hands, but also traveling up to the shoulders.     PHYSICAL:    Cranial nerves grossly normal. Motor examination showed strength to be 5 of 5 throu Nerve / Sites Muscle Latency Amplitude Rel Amp Durat Segments Distance Lat Diff Velocity     ms mV % ms  cm ms m/s   R Median - APB      Wrist APB 4.64 5.5 100 5.73 Wrist - APB 8        Elbow APB 9.01 4.5 82.5 6.35 Elbow - Wrist 22 4.37 50   L Median - APB R. Biceps brachii Musculocutaneous C5-C6 N None None None None N N N N   L. Triceps brachii Radial C6-C8 N None None None None N N N N   R. Triceps brachii Radial C6-C8 N None None None None N N N N   L.  Extensor digitorum communis Radial C7-C8 N None None

## 2018-09-21 ENCOUNTER — OFFICE VISIT (OUTPATIENT)
Dept: OPTOMETRY | Facility: CLINIC | Age: 63
End: 2018-09-21

## 2018-09-21 DIAGNOSIS — H52.03 HYPEROPIA WITH PRESBYOPIA, BILATERAL: ICD-10-CM

## 2018-09-21 DIAGNOSIS — Z79.4 TYPE 2 DIABETES MELLITUS WITHOUT COMPLICATION, WITH LONG-TERM CURRENT USE OF INSULIN (HCC): Primary | ICD-10-CM

## 2018-09-21 DIAGNOSIS — H52.4 HYPEROPIA WITH PRESBYOPIA, BILATERAL: ICD-10-CM

## 2018-09-21 DIAGNOSIS — H25.13 AGE-RELATED NUCLEAR CATARACT OF BOTH EYES: ICD-10-CM

## 2018-09-21 DIAGNOSIS — E11.9 TYPE 2 DIABETES MELLITUS WITHOUT COMPLICATION, WITH LONG-TERM CURRENT USE OF INSULIN (HCC): Primary | ICD-10-CM

## 2018-09-21 PROCEDURE — 92014 COMPRE OPH EXAM EST PT 1/>: CPT | Performed by: OPTOMETRIST

## 2018-09-21 PROCEDURE — 92015 DETERMINE REFRACTIVE STATE: CPT | Performed by: OPTOMETRIST

## 2018-09-21 NOTE — ASSESSMENT & PLAN NOTE
New glasses RX given to update as needed. Patient does not want a bifocal so will get distance only and reading only.

## 2018-09-21 NOTE — PROGRESS NOTES
Irais Rincon is a 58year old female. HPI:     HPI     Diabetic Eye Exam     Diabetes characteristics include Type 2, controlled with diet, on insulin and taking oral medications. Duration of 13 years. Number of years on pills 13.   Does PT check his • Diabetes Sister    • Lipids Other         Family H/o: Hyperlipidemia   • Glaucoma Neg        Social History: Social History    Tobacco Use      Smoking status: Never Smoker      Smokeless tobacco: Never Used    Alcohol use: No      Alcohol/week: 0.0 oz CAPSULE BY MOUTH EVERY DAY WITH FOOD Disp: 90 capsule Rfl: 2   BD PEN NEEDLE CARY U/F 32G X 4 MM Does not apply Misc USE AS DIRECTED TWICE A DAY Disp: 100 each Rfl: 2   Insulin Pen Needle (BD PEN NEEDLE CARY U/F) 32G X 4 MM Does not apply Misc USE AS SANT[facility] Nasal/temp pinguecula Nasal/temp pinguecula    Cornea Clear Clear    Anterior Chamber Deep and quiet Deep and quiet    Iris Normal Normal    Lens 1+ Nuclear sclerosis, 2+ Cortical cataract 1+ Nuclear sclerosis, 1+ Cortical cataract    Vitreous Clear Clear instructions:  Return in about 1 year (around 9/21/2019) for Diabetic Eye exam.    9/21/2018  Scribed by: Tom Oh

## 2018-09-21 NOTE — PATIENT INSTRUCTIONS
Age-related nuclear cataract of both eyes  No treatment is required. Will continue to observe. Hyperopia with presbyopia, bilateral  New glasses RX given to update as needed. Patient does not want a bifocal so will get distance only and reading only.

## 2018-09-29 ENCOUNTER — LAB ENCOUNTER (OUTPATIENT)
Dept: LAB | Age: 63
End: 2018-09-29
Attending: INTERNAL MEDICINE
Payer: COMMERCIAL

## 2018-09-29 DIAGNOSIS — Z79.4 DIABETES MELLITUS TYPE 2, INSULIN DEPENDENT (HCC): ICD-10-CM

## 2018-09-29 DIAGNOSIS — E11.9 DIABETES MELLITUS TYPE 2, INSULIN DEPENDENT (HCC): ICD-10-CM

## 2018-09-29 DIAGNOSIS — E83.42 HYPOMAGNESEMIA: ICD-10-CM

## 2018-09-29 PROCEDURE — 83735 ASSAY OF MAGNESIUM: CPT

## 2018-09-29 PROCEDURE — 83036 HEMOGLOBIN GLYCOSYLATED A1C: CPT

## 2018-09-29 PROCEDURE — 81003 URINALYSIS AUTO W/O SCOPE: CPT

## 2018-09-29 PROCEDURE — 36415 COLL VENOUS BLD VENIPUNCTURE: CPT

## 2018-09-29 PROCEDURE — 85025 COMPLETE CBC W/AUTO DIFF WBC: CPT

## 2018-09-29 PROCEDURE — 84443 ASSAY THYROID STIM HORMONE: CPT

## 2018-09-29 PROCEDURE — 82043 UR ALBUMIN QUANTITATIVE: CPT

## 2018-09-29 PROCEDURE — 80053 COMPREHEN METABOLIC PANEL: CPT

## 2018-09-29 PROCEDURE — 82570 ASSAY OF URINE CREATININE: CPT

## 2018-09-29 PROCEDURE — 80061 LIPID PANEL: CPT

## 2018-10-04 ENCOUNTER — OFFICE VISIT (OUTPATIENT)
Dept: INTERNAL MEDICINE CLINIC | Facility: CLINIC | Age: 63
End: 2018-10-04

## 2018-10-04 VITALS
WEIGHT: 153.81 LBS | BODY MASS INDEX: 30.2 KG/M2 | TEMPERATURE: 98 F | SYSTOLIC BLOOD PRESSURE: 130 MMHG | HEIGHT: 60 IN | DIASTOLIC BLOOD PRESSURE: 60 MMHG | RESPIRATION RATE: 18 BRPM | HEART RATE: 66 BPM

## 2018-10-04 DIAGNOSIS — I10 ESSENTIAL HYPERTENSION: ICD-10-CM

## 2018-10-04 DIAGNOSIS — E11.9 DIABETES MELLITUS TYPE 2, INSULIN DEPENDENT (HCC): ICD-10-CM

## 2018-10-04 DIAGNOSIS — E78.2 MIXED HYPERLIPIDEMIA: Primary | ICD-10-CM

## 2018-10-04 DIAGNOSIS — G56.03 BILATERAL CARPAL TUNNEL SYNDROME: ICD-10-CM

## 2018-10-04 DIAGNOSIS — E55.9 VITAMIN D DEFICIENCY: ICD-10-CM

## 2018-10-04 DIAGNOSIS — Z79.4 DIABETES MELLITUS TYPE 2, INSULIN DEPENDENT (HCC): ICD-10-CM

## 2018-10-04 DIAGNOSIS — E83.42 HYPOMAGNESEMIA: ICD-10-CM

## 2018-10-04 DIAGNOSIS — E11.9 TYPE 2 DIABETES MELLITUS WITHOUT COMPLICATION, WITH LONG-TERM CURRENT USE OF INSULIN (HCC): ICD-10-CM

## 2018-10-04 DIAGNOSIS — Z79.4 TYPE 2 DIABETES MELLITUS WITHOUT COMPLICATION, WITH LONG-TERM CURRENT USE OF INSULIN (HCC): ICD-10-CM

## 2018-10-04 PROCEDURE — 99212 OFFICE O/P EST SF 10 MIN: CPT | Performed by: INTERNAL MEDICINE

## 2018-10-04 PROCEDURE — 99214 OFFICE O/P EST MOD 30 MIN: CPT | Performed by: INTERNAL MEDICINE

## 2018-10-04 PROCEDURE — 90471 IMMUNIZATION ADMIN: CPT | Performed by: INTERNAL MEDICINE

## 2018-10-04 PROCEDURE — 90732 PPSV23 VACC 2 YRS+ SUBQ/IM: CPT | Performed by: INTERNAL MEDICINE

## 2018-10-04 PROCEDURE — 90472 IMMUNIZATION ADMIN EACH ADD: CPT | Performed by: INTERNAL MEDICINE

## 2018-10-04 PROCEDURE — 90686 IIV4 VACC NO PRSV 0.5 ML IM: CPT | Performed by: INTERNAL MEDICINE

## 2018-10-04 RX ORDER — MAGNESIUM OXIDE 400 MG/1
TABLET ORAL
Qty: 30 TABLET | Refills: 3 | Status: SHIPPED | OUTPATIENT
Start: 2018-10-04 | End: 2019-02-14

## 2018-10-04 RX ORDER — HYDROXYZINE HYDROCHLORIDE 10 MG/1
10 TABLET, FILM COATED ORAL
Refills: 1 | COMMUNITY
Start: 2018-09-15 | End: 2018-10-04

## 2018-10-04 NOTE — PROGRESS NOTES
HPI:    Patient ID: Godwin Ordoñez is a 58year old female. Flu and pneumovac provided  Cast.  EMG as follows    TECHNICAL SUMMARY:     There were no significant technical difficulty encountered during this study.   Nerve conduction studies were performe polyneuropathy or cervical radiculopathy.     CLINICAL CORRELATION:     These abnormal findings are consistent with the patient's clinical complaints on peripheral nerve or muscle basis.     Joseph Guadarrama MD      Hypertension   This is a chronic pro carbohydrate counting. She never participates in exercise. Her home blood glucose trend is fluctuating minimally. Her breakfast blood glucose is taken between 7-8 am. Her breakfast blood glucose range is generally  mg/dl.  Her dinner blood glucose is tablet (10 mg total) by mouth nightly. Disp: 90 tablet Rfl: 1   Losartan Potassium-HCTZ 100-25 MG Oral Tab Take 1 tablet by mouth daily.  Disp: 90 tablet Rfl: 1   Magnesium 200 MG Oral Tab 1 tablet p.o. daily Disp: 30 tablet Rfl: 3   Pantoprazole Sodium 40 12.5 MG Oral Tab 1 tablet by mouth twice a day when necessary Disp: 30 tablet Rfl: 0     Allergies:No Known Allergies      10/04/18  1127   BP: 130/60   Pulse:    Resp:    Temp:      Body mass index is 30.04 kg/m².     PHYSICAL EXAM:   Physical Exam   Const controlled on losartan hydrochlorothiazide 100/25 1 tablet daily, diltiazem 180 mg daily. She is salt in the diet and drink plenty of fluids to keep hydrated. Kidney functions have been stable.   She does not have any chest pain, palpitations or lower ext Advised to continue on the magnesium supplements but increase to 200 mg twice daily and recheck with the next blood draw          RESOLVED: Type 2 diabetes mellitus without complication, with long-term current use of insulin (HCC)    Relevant Orders    CBC

## 2018-10-04 NOTE — PATIENT INSTRUCTIONS
Problem List Items Addressed This Visit        Unprioritized    Carpal tunnel syndrome     Bilateral carpal tunnel syndrome–significant on the right side and is status post carpal tunnel surgery with improvement in symptoms.   She continues to have left-kell Hypomagnesemia     Magnesium levels are slightly improved at 1.7.   Advised to continue on the magnesium supplements but increase to 200 mg twice daily and recheck with the next blood draw          RESOLVED: Type 2 diabetes mellitus without complicati

## 2018-10-04 NOTE — ASSESSMENT & PLAN NOTE
Magnesium levels are slightly improved at 1.7.   Advised to continue on the magnesium supplements but increase to 200 mg twice daily and recheck with the next blood draw

## 2018-10-04 NOTE — ASSESSMENT & PLAN NOTE
Bilateral carpal tunnel syndrome–significant on the right side and is status post carpal tunnel surgery with improvement in symptoms. She continues to have left-sided symptoms–there is chronic denervation in bilateral median nerves.   Patient is advised to

## 2018-10-04 NOTE — ASSESSMENT & PLAN NOTE
Blood pressure 130/60, pulse 66, temperature 97.6 °F (36.4 °C), temperature source Oral, resp. rate 18, height 5' (1.524 m), weight 153 lb 12.8 oz (69.8 kg), not currently breastfeeding.   Stable blood pressure, well controlled on losartan hydrochlorothiazi

## 2018-10-04 NOTE — ASSESSMENT & PLAN NOTE
Lipid panel and liver function test looked stable on fenofibrate and atorvastatin at 10 mg daily. Continue the same dose of medications.   Recheck labs in 3-month

## 2018-10-04 NOTE — ASSESSMENT & PLAN NOTE
This has been supplemented in the past, recheck labs with the next blood draw. Continue on over-the-counter vitamin D at 2000 units daily.

## 2018-10-04 NOTE — ASSESSMENT & PLAN NOTE
Patient has not brought in her blood sugar records to the office visit. Hemoglobin A1c is at 7.8 which is slightly higher than before.   She is currently on glimepiride 2 mg once daily, metformin 500 mg twice daily, NovoLog mix– 55 units in the morning and

## 2018-10-17 NOTE — ASSESSMENT & PLAN NOTE
Blood pressure 131/72, pulse 64, resp. rate 16, height 5' 2\" (1.575 m), weight 157 lb (71.215 kg), not currently breastfeeding. Blood pressures look stable–advised to continue on losartan hydrochlorothiazide 100/12. 5–1 tablet once daily and diltiazem at None known

## 2018-10-30 ENCOUNTER — OFFICE VISIT (OUTPATIENT)
Dept: ORTHOPEDICS CLINIC | Facility: CLINIC | Age: 63
End: 2018-10-30

## 2018-10-30 DIAGNOSIS — G56.23 CUBITAL TUNNEL SYNDROME, BILATERAL: ICD-10-CM

## 2018-10-30 DIAGNOSIS — M54.12 CERVICAL RADICULITIS: Primary | ICD-10-CM

## 2018-10-30 DIAGNOSIS — G56.02 LEFT CARPAL TUNNEL SYNDROME: ICD-10-CM

## 2018-10-30 PROCEDURE — 99213 OFFICE O/P EST LOW 20 MIN: CPT | Performed by: ORTHOPAEDIC SURGERY

## 2018-10-30 PROCEDURE — 99212 OFFICE O/P EST SF 10 MIN: CPT | Performed by: ORTHOPAEDIC SURGERY

## 2018-10-30 RX ORDER — PREDNISONE 20 MG/1
20 TABLET ORAL DAILY
Qty: 5 TABLET | Refills: 0 | Status: SHIPPED | OUTPATIENT
Start: 2018-10-30 | End: 2018-11-04

## 2018-10-30 NOTE — PROGRESS NOTES
10/30/2018  Charlotte Betty  11/27/1955  58year old   female  Marleni Frank MD    HPI:   Patient presents with:  Hand Pain: Pt is having bilateral hand pain. Pt states the left hand is bad. Pt is left handed.  Has hand surgery in the past on the right ha sign, external rotation lag sign, Hornblower's sign, lift off test, and belly press test.  The patient has a negative Neer sign, Dahl sign, and abduction sign. The patient has no pain with cross body adduction localized to the acromioclavicular joint.

## 2018-11-03 NOTE — TELEPHONE ENCOUNTER
Cholesterol Medications  Protocol Criteria:  · Appointment scheduled in the past 12 months or in the next 3 months  · ALT & LDL on file in the past 12 months  · ALT result < 80  · LDL result <130   Recent Outpatient Visits            4 days ago Cervical ra HMO  M54.12  Cervical radiculitis  G56.02 Left carpal tunnel syndrome  G56.23  Cubital tunnel syndrome, bilateral    In 1 month Ted Escobar, PT [facility] in [place] [address].  L24.68  Cervical radiculitis  G56.02 Left carpal tunnel syndrome  G

## 2018-11-05 ENCOUNTER — OFFICE VISIT (OUTPATIENT)
Dept: PHYSICAL THERAPY | Age: 63
End: 2018-11-05
Attending: ORTHOPAEDIC SURGERY
Payer: COMMERCIAL

## 2018-11-05 DIAGNOSIS — G56.23 CUBITAL TUNNEL SYNDROME, BILATERAL: ICD-10-CM

## 2018-11-05 DIAGNOSIS — M54.12 CERVICAL RADICULITIS: ICD-10-CM

## 2018-11-05 DIAGNOSIS — G56.02 LEFT CARPAL TUNNEL SYNDROME: ICD-10-CM

## 2018-11-05 PROCEDURE — 97110 THERAPEUTIC EXERCISES: CPT

## 2018-11-05 PROCEDURE — 97162 PT EVAL MOD COMPLEX 30 MIN: CPT

## 2018-11-05 NOTE — PROGRESS NOTES
PHYSICAL THERAPY EVALUATION:   Referring Physician: Dr. Maria Elena Cheema  Date of Onset 10/30/18 Date of Service: 11/5/2018   Diagnosis:  Cervical radiculitis (M54.12)  Left carpal tunnel syndrome (G56.02)  Cubital tunnel syndrome, bilateral (G56.23)    PATIENT S stenosis. Left greater than right facet arthropathy resulting in mild to moderate left greater than right foraminal narrowing. Additional multilevel degenerative disc disease without significant central stenosis or foraminal narrowing.      Equivocal sm posture that affects  Scapula/humeral movements. AT this time repeated cervical motions and sustained slouched did not produce RUE pain, pt c/o pain only with R shoulder movements. PT will continually assess    Charlotte would benefit from skilled Physical Thera Treatment: 15 min     Total Treatment Time: 45 min   Not in  agreement with FOTO score and clinical rationale, this evaluation involved Moderate Complexity due to noted comorbidities, impairments and status of symptoms         PLAN OF CARE:    Goals: 1. Date____________________    Certification From: 73/9/8170  To:2/3/2019

## 2018-11-06 RX ORDER — FENOFIBRATE 134 MG/1
CAPSULE ORAL
Qty: 90 CAPSULE | Refills: 2 | Status: SHIPPED | OUTPATIENT
Start: 2018-11-06 | End: 2019-08-05

## 2018-11-06 RX ORDER — DILTIAZEM HYDROCHLORIDE 180 MG/1
180 CAPSULE, EXTENDED RELEASE ORAL DAILY
Qty: 90 CAPSULE | Refills: 2 | Status: SHIPPED | OUTPATIENT
Start: 2018-11-06 | End: 2019-08-01

## 2018-11-06 NOTE — TELEPHONE ENCOUNTER
Refill Protocol Appointment Criteria  · Appointment scheduled in the past 12 months or in the next 3 months  Recent Outpatient Visits            Yesterday Cervical radiculitis    [facility] in [place], [place]    Office Visit    1 clary syndrome, bilateral    In 1 month Pa August, PT [facility] in [facility] [address]  O29.86  Cervical radiculitis  U55.48 Left carpal tunnel syndrome  G56.23  Cubital tunnel syndrome, bilateral        Hypertensive Medications  Protocol Criteria: O  M54.12  Cervical radiculitis  G56.02 Left carpal tunnel syndrome  G56.23  Cubital tunnel syndrome, bilateral    In 4 weeks Rain Sidhu, PT [facility] in [place] [address].  F49.79  Cervical radiculitis  G56.02 Left carpal tunnel syndrome  G

## 2018-11-07 ENCOUNTER — OFFICE VISIT (OUTPATIENT)
Dept: PHYSICAL THERAPY | Age: 63
End: 2018-11-07
Attending: ORTHOPAEDIC SURGERY
Payer: COMMERCIAL

## 2018-11-07 DIAGNOSIS — M54.12 CERVICAL RADICULITIS: ICD-10-CM

## 2018-11-07 DIAGNOSIS — G56.02 LEFT CARPAL TUNNEL SYNDROME: ICD-10-CM

## 2018-11-07 DIAGNOSIS — G56.23 CUBITAL TUNNEL SYNDROME, BILATERAL: ICD-10-CM

## 2018-11-07 PROCEDURE — 97110 THERAPEUTIC EXERCISES: CPT

## 2018-11-07 NOTE — PROGRESS NOTES
Dx: Cervical radiculitis (M54.12)  Left carpal tunnel syndrome (G56.02)  Cubital tunnel syndrome, bilateral (Z14.37)              Eval date: 11/5/18  Surgery date: NA   Next MD visit: none scheduled  Fall Risk: standard         Precautions: n/a           M at this time requiring tactile and maximum verbal cues. Will continually assess cervical effects/direction of preference if any on RUE symptoms. AT this R hand seems not affected by cervical motions         Goals:    1.  Pt will be I with HEP,its progressi

## 2018-11-14 ENCOUNTER — APPOINTMENT (OUTPATIENT)
Dept: PHYSICAL THERAPY | Age: 63
End: 2018-11-14
Attending: ORTHOPAEDIC SURGERY
Payer: COMMERCIAL

## 2018-11-15 NOTE — TELEPHONE ENCOUNTER
Please review; protocol failed.       Diabetes Medications  Protocol Criteria:  · Appointment scheduled in the past 6 months or the next 3 months  · A1C < 7.5 in the past 6 months  · Creatinine in the past 12 months  · Creatinine result < 1.5   Recent Outpa A1C 7.8 (H) 09/29/2018     Lab Results   Component Value Date    CREATSERUM 0.94 09/29/2018

## 2018-11-16 ENCOUNTER — OFFICE VISIT (OUTPATIENT)
Dept: PHYSICAL THERAPY | Age: 63
End: 2018-11-16
Attending: ORTHOPAEDIC SURGERY
Payer: COMMERCIAL

## 2018-11-16 DIAGNOSIS — G56.02 LEFT CARPAL TUNNEL SYNDROME: ICD-10-CM

## 2018-11-16 DIAGNOSIS — M54.12 CERVICAL RADICULITIS: ICD-10-CM

## 2018-11-16 DIAGNOSIS — G56.23 CUBITAL TUNNEL SYNDROME, BILATERAL: ICD-10-CM

## 2018-11-16 PROCEDURE — 97110 THERAPEUTIC EXERCISES: CPT | Performed by: PHYSICAL THERAPIST

## 2018-11-16 PROCEDURE — 97140 MANUAL THERAPY 1/> REGIONS: CPT | Performed by: PHYSICAL THERAPIST

## 2018-11-16 RX ORDER — INSULIN ASPART 100 [IU]/ML
INJECTION, SUSPENSION SUBCUTANEOUS
Qty: 35 PEN | Refills: 2 | Status: SHIPPED | OUTPATIENT
Start: 2018-11-16 | End: 2019-10-16

## 2018-11-16 NOTE — PROGRESS NOTES
Dx: Cervical radiculitis (M54.12)  Left carpal tunnel syndrome (G56.02)  Cubital tunnel syndrome, bilateral (N01.19)              Eval date: 11/5/18  Surgery date: NA   Next MD visit: none scheduled  Fall Risk: standard         Precautions: n/a           M decreased in pain by 50% or better to be able to do daily tasks with ease especially difficulty with using R arm   3.  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

## 2018-11-20 ENCOUNTER — OFFICE VISIT (OUTPATIENT)
Dept: PHYSICAL THERAPY | Age: 63
End: 2018-11-20
Attending: ORTHOPAEDIC SURGERY
Payer: COMMERCIAL

## 2018-11-20 DIAGNOSIS — M54.12 CERVICAL RADICULITIS: ICD-10-CM

## 2018-11-20 DIAGNOSIS — G56.02 LEFT CARPAL TUNNEL SYNDROME: ICD-10-CM

## 2018-11-20 DIAGNOSIS — G56.23 CUBITAL TUNNEL SYNDROME, BILATERAL: ICD-10-CM

## 2018-11-20 PROCEDURE — 97140 MANUAL THERAPY 1/> REGIONS: CPT

## 2018-11-20 PROCEDURE — 97110 THERAPEUTIC EXERCISES: CPT

## 2018-11-20 NOTE — PROGRESS NOTES
Dx: Cervical radiculitis (M54.12)  Left carpal tunnel syndrome (G56.02)  Cubital tunnel syndrome, bilateral (I11.56)              Eval date: 11/5/18  Surgery date: NA   Next MD visit: none scheduled  Fall Risk: standard         Precautions: n/a           M x10 mins and paracervicals  ·        Last PN sent to MD:11/5/18  Assessment:Cervical and sholder ROM tested today with marked improvement, strength not tested      AROM: Loss of motion Pain (+/-)   Flexion WFL -   Extension WFL -   R Sidebend WFL -   L Mitch

## 2018-11-29 ENCOUNTER — OFFICE VISIT (OUTPATIENT)
Dept: PHYSICAL THERAPY | Age: 63
End: 2018-11-29
Attending: ORTHOPAEDIC SURGERY
Payer: COMMERCIAL

## 2018-11-29 DIAGNOSIS — G56.02 LEFT CARPAL TUNNEL SYNDROME: ICD-10-CM

## 2018-11-29 DIAGNOSIS — G56.23 CUBITAL TUNNEL SYNDROME, BILATERAL: ICD-10-CM

## 2018-11-29 DIAGNOSIS — M54.12 CERVICAL RADICULITIS: ICD-10-CM

## 2018-11-29 PROCEDURE — 97110 THERAPEUTIC EXERCISES: CPT

## 2018-11-29 PROCEDURE — 97140 MANUAL THERAPY 1/> REGIONS: CPT

## 2018-11-29 NOTE — PROGRESS NOTES
Dx: Cervical radiculitis (M54.12)  Left carpal tunnel syndrome (G56.02)  Cubital tunnel syndrome, bilateral (G03.23)              Eval date: 11/5/18  Surgery date: NA   Next MD visit: none scheduled  Fall Risk: standard         Precautions: n/a           M · Scapula retraction: narrow RTB x20  · Extension RTB x20  · STM on  RUT x10 mins and paracervicals  · UBE level 1 x6 mins  · Seated cervical retraction then extension x5x2  · Standing shoulder  flexion as tolerated  · Standing flexion to 90 1# 2x10  · S degrees.:improved  4.  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

## 2018-11-30 ENCOUNTER — LAB ENCOUNTER (OUTPATIENT)
Dept: LAB | Age: 63
End: 2018-11-30
Attending: INTERNAL MEDICINE
Payer: COMMERCIAL

## 2018-11-30 DIAGNOSIS — E55.9 VITAMIN D DEFICIENCY: ICD-10-CM

## 2018-11-30 DIAGNOSIS — Z79.4 TYPE 2 DIABETES MELLITUS WITHOUT COMPLICATION, WITH LONG-TERM CURRENT USE OF INSULIN (HCC): ICD-10-CM

## 2018-11-30 DIAGNOSIS — E11.9 TYPE 2 DIABETES MELLITUS WITHOUT COMPLICATION, WITH LONG-TERM CURRENT USE OF INSULIN (HCC): ICD-10-CM

## 2018-11-30 PROCEDURE — 80061 LIPID PANEL: CPT

## 2018-11-30 PROCEDURE — 80053 COMPREHEN METABOLIC PANEL: CPT

## 2018-11-30 PROCEDURE — 82570 ASSAY OF URINE CREATININE: CPT

## 2018-11-30 PROCEDURE — 36415 COLL VENOUS BLD VENIPUNCTURE: CPT

## 2018-11-30 PROCEDURE — 83036 HEMOGLOBIN GLYCOSYLATED A1C: CPT

## 2018-11-30 PROCEDURE — 82607 VITAMIN B-12: CPT

## 2018-11-30 PROCEDURE — 85025 COMPLETE CBC W/AUTO DIFF WBC: CPT

## 2018-11-30 PROCEDURE — 82043 UR ALBUMIN QUANTITATIVE: CPT

## 2018-11-30 PROCEDURE — 81001 URINALYSIS AUTO W/SCOPE: CPT

## 2018-11-30 PROCEDURE — 84443 ASSAY THYROID STIM HORMONE: CPT

## 2018-11-30 PROCEDURE — 82306 VITAMIN D 25 HYDROXY: CPT

## 2018-12-04 ENCOUNTER — OFFICE VISIT (OUTPATIENT)
Dept: PHYSICAL THERAPY | Age: 63
End: 2018-12-04
Attending: ORTHOPAEDIC SURGERY
Payer: COMMERCIAL

## 2018-12-04 DIAGNOSIS — G56.02 LEFT CARPAL TUNNEL SYNDROME: ICD-10-CM

## 2018-12-04 DIAGNOSIS — M54.12 CERVICAL RADICULITIS: ICD-10-CM

## 2018-12-04 DIAGNOSIS — G56.23 CUBITAL TUNNEL SYNDROME, BILATERAL: ICD-10-CM

## 2018-12-04 PROCEDURE — 97110 THERAPEUTIC EXERCISES: CPT

## 2018-12-04 NOTE — PROGRESS NOTES
Dx: Cervical radiculitis (M54.12)  Left carpal tunnel syndrome (G56.02)  Cubital tunnel syndrome, bilateral (H39.36)              Eval date: 11/5/18  Surgery date: NA   Next MD visit: none scheduled  Fall Risk: standard         Precautions: n/a           M scaption  · STM on  BUT x10 mins and paracervicals       Last PN sent to MD:11/5/18  Assessment:  AROM remains the same but painful through and end range  PT had discussion with pt that she needs to observe good posture as this alleviates pain. Uriel Total Timed Treatment: 40 min  Total Treatment Time: 40 min

## 2018-12-13 ENCOUNTER — APPOINTMENT (OUTPATIENT)
Dept: PHYSICAL THERAPY | Age: 63
End: 2018-12-13
Attending: ORTHOPAEDIC SURGERY
Payer: COMMERCIAL

## 2018-12-17 ENCOUNTER — APPOINTMENT (OUTPATIENT)
Dept: PHYSICAL THERAPY | Age: 63
End: 2018-12-17
Attending: INTERNAL MEDICINE
Payer: COMMERCIAL

## 2018-12-18 ENCOUNTER — OFFICE VISIT (OUTPATIENT)
Dept: PHYSICAL THERAPY | Age: 63
End: 2018-12-18
Attending: INTERNAL MEDICINE
Payer: COMMERCIAL

## 2018-12-18 PROCEDURE — 97110 THERAPEUTIC EXERCISES: CPT

## 2018-12-18 NOTE — PROGRESS NOTES
Patient Name: Nena Rayo, : 1955, MRN: [id number]   Date:  2018  Referring Physician:  Dr Lacey Foy    Diagnosis: Cervical radiculitis (M54.12)  Left carpal tunnel syndrome (G56.02)  Cubital tunnel syndrome, bilateral (G56.23    Progre Flex (supine) 160+ 160 4/5 4+/5   Abd (supine) 180 180 4/5 4+/5   ER (supine) 85 90 4/5 4+/5   IR (supine) 70 70 4/5 4+/5   Standing: -- -- -- --   Flexion 160 160 -- --   Abduction 160 160 -- --   Extension 60 68 5/5 5/5   IR behind back T10 T10 --- --- care.    X___________________________________________________ Date____________________    Certification From: 46/43/8746  To:3/18/2019         TREATMENT THIS DAY:  UBE level  2x6 mins   Standing flexion 1# oveRhead fleion    Standing scaption 1# 2x10   Sea

## 2018-12-21 NOTE — TELEPHONE ENCOUNTER
Requested Prescriptions     Pending Prescriptions Disp Refills   • HYDROXYZINE HCL 10 MG Oral Tab [Pharmacy Med Name: HYDROXYZINE HCL 10 MG TABLET] 90 tablet 1     Sig: TAKE 1 TABLET BY MOUTH EVERY DAY       Last Office Visit with PCP: 10/4/2018    Unable

## 2018-12-22 RX ORDER — HYDROXYZINE HYDROCHLORIDE 10 MG/1
TABLET, FILM COATED ORAL
Qty: 90 TABLET | Refills: 1 | Status: SHIPPED | OUTPATIENT
Start: 2018-12-22 | End: 2019-07-11

## 2018-12-29 RX ORDER — GLIMEPIRIDE 2 MG/1
TABLET ORAL
Qty: 90 TABLET | Refills: 1 | Status: SHIPPED | OUTPATIENT
Start: 2018-12-29 | End: 2019-07-03

## 2018-12-29 NOTE — TELEPHONE ENCOUNTER
Please review; protocol failed.     Diabetes Medications  Protocol Criteria:  · Appointment scheduled in the past 6 months or the next 3 months  · A1C < 7.5 in the past 6 months  · Creatinine in the past 12 months  · Creatinine result < 1.5   [facility]  F[facility].46  Cervical radiculitis  G56.02 Left carpal tunnel syndrome  G56.23  Cubital tunnel syndrome, bilateral        Lab Results   Component Value Date    A1C 7.9 (H) 11/30/2018     Lab Results   Component Value Date    CREATSERUM 1.07 11/30

## 2019-01-10 ENCOUNTER — OFFICE VISIT (OUTPATIENT)
Dept: PHYSICAL THERAPY | Age: 64
End: 2019-01-10
Attending: INTERNAL MEDICINE
Payer: COMMERCIAL

## 2019-01-10 PROCEDURE — 97110 THERAPEUTIC EXERCISES: CPT

## 2019-01-10 NOTE — PROGRESS NOTES
Dx: Cervical radiculitis (M54.12)  Left carpal tunnel syndrome (G56.02)  Cubital tunnel syndrome, bilateral (D01.92)              Eval date: 11/5/18  Surgery date: NA   Next MD visit: none scheduled  Fall Risk: standard         Precautions: n/a           M exercises and made copies and reiterated that its important to do HEP if she wishes to come 1 x a week due to work schedule. She states okay at this time with her schedule and will monitor how she does with HEP    Goals:    1.  Pt will be I with HEP,its pro

## 2019-01-13 RX ORDER — GABAPENTIN 100 MG/1
CAPSULE ORAL
Qty: 60 CAPSULE | Refills: 2 | Status: SHIPPED | OUTPATIENT
Start: 2019-01-13 | End: 2019-04-20

## 2019-01-14 ENCOUNTER — OFFICE VISIT (OUTPATIENT)
Dept: PHYSICAL THERAPY | Age: 64
End: 2019-01-14
Attending: INTERNAL MEDICINE
Payer: COMMERCIAL

## 2019-01-14 PROCEDURE — 97110 THERAPEUTIC EXERCISES: CPT

## 2019-01-14 NOTE — PROGRESS NOTES
Dx: Cervical radiculitis (M54.12)  Left carpal tunnel syndrome (G56.02)  Cubital tunnel syndrome, bilateral (O01.91)              Eval date: 11/5/18  Surgery date: NA   Next MD visit: none scheduled  Fall Risk: standard         Precautions: n/a           M 2x5  · SUpine AROM with hold at end range       Last PN sent to MD12/18/18  Assessment: minimal cues needed for exercises this day. Able to do overhead activities with no pain in flexion and abduction. Pt did not c/o pain after the therapy. PAin on the end

## 2019-01-15 ENCOUNTER — OFFICE VISIT (OUTPATIENT)
Dept: INTERNAL MEDICINE CLINIC | Facility: CLINIC | Age: 64
End: 2019-01-15

## 2019-01-15 VITALS
DIASTOLIC BLOOD PRESSURE: 83 MMHG | SYSTOLIC BLOOD PRESSURE: 129 MMHG | WEIGHT: 152 LBS | HEART RATE: 68 BPM | HEIGHT: 62 IN | BODY MASS INDEX: 27.97 KG/M2 | RESPIRATION RATE: 16 BRPM

## 2019-01-15 DIAGNOSIS — I10 ESSENTIAL HYPERTENSION: ICD-10-CM

## 2019-01-15 DIAGNOSIS — E11.9 DIABETES MELLITUS TYPE 2, INSULIN DEPENDENT (HCC): ICD-10-CM

## 2019-01-15 DIAGNOSIS — Z79.4 DIABETES MELLITUS TYPE 2, INSULIN DEPENDENT (HCC): ICD-10-CM

## 2019-01-15 DIAGNOSIS — E78.2 MIXED HYPERLIPIDEMIA: Primary | ICD-10-CM

## 2019-01-15 PROCEDURE — 99214 OFFICE O/P EST MOD 30 MIN: CPT | Performed by: INTERNAL MEDICINE

## 2019-01-15 PROCEDURE — 99212 OFFICE O/P EST SF 10 MIN: CPT | Performed by: INTERNAL MEDICINE

## 2019-01-15 NOTE — ASSESSMENT & PLAN NOTE
Lipid panel and liver function tests have been stable on atorvastatin at 10 mg daily and fenofibrate. She has tolerated her medications well. Recheck labs have been ordered, advised to complete in the next 2-3 months.

## 2019-01-15 NOTE — PATIENT INSTRUCTIONS
Problem List Items Addressed This Visit        Unprioritized    Diabetes mellitus type 2, insulin dependent ([facility] [address]     Blood sugars continue to fluctuate.   Currently on glimepiride 2 mg once daily, metformin 500 mg twice daily and NovoLog mix–55 units in th advised to complete in the next 2-3 months.

## 2019-01-15 NOTE — ASSESSMENT & PLAN NOTE
Blood pressure 129/83, pulse 68, resp. rate 16, height 5' 2\" (1.575 m), weight 152 lb (68.9 kg), not currently breastfeeding. Blood pressures look stable, well controlled on losartan hydrochlorothiazide 100/20 5-1 tablet daily, diltiazem 180 mg daily.

## 2019-01-15 NOTE — PROGRESS NOTES
HPI:    Patient ID: Yobany Martinez is a 61year old female.     Written by Stacy Reyes MD on 12/2/2018 12:48 PM   The blood counts look normal except slightly elevated platelet counts-nonspecific -may go up from an infection,we ill recheck in a few month  mg/dl. Her dinner blood glucose is taken between 5-6 pm. Her dinner blood glucose range is generally 110-130 mg/dl. An ACE inhibitor/angiotensin II receptor blocker is being taken. She sees a podiatrist.Eye exam is current.    Hyperlipidemia   This i Allergic/Immunologic: Negative. Neurological: Negative. Hematological: Negative. Psychiatric/Behavioral: Negative.                Current Outpatient Medications:  gabapentin 100 MG Oral Cap TAKE 1 CAPSULE BY MOUTH TWICE A DAY Disp: 60 capsule Rfl MG TOTAL) BY MOUTH ONCE DAILY. Disp: 90 tablet Rfl: 1   Blood Glucose Monitoring Suppl (ONETOUCH ULTRA 2) w/Device Does not apply Kit Test blood sugar twice daily. Disp: 1 kit Rfl: 0   CETIRIZINE 10 MG Oral Tab TAKE 1 TABLET (10 MG TOTAL) BY MOUTH DAILY.  D normal.   Skin: No rash noted. No erythema. Psychiatric: She has a normal mood and affect. Her behavior is normal. Thought content normal.   Nursing note and vitals reviewed.              ASSESSMENT/PLAN:     Problem List Items Addressed This Visit hyperlipidemia - Primary     Lipid panel and liver function tests have been stable on atorvastatin at 10 mg daily and fenofibrate. She has tolerated her medications well. Recheck labs have been ordered, advised to complete in the next 2-3 months.

## 2019-01-15 NOTE — ASSESSMENT & PLAN NOTE
Blood sugars continue to fluctuate. Currently on glimepiride 2 mg once daily, metformin 500 mg twice daily and NovoLog mix–55 units in the morning and 50 units in the evening as per our records.   However patient has been only taking 50 units in the evenin

## 2019-02-04 RX ORDER — LEVOTHYROXINE SODIUM 0.05 MG/1
TABLET ORAL
Qty: 90 TABLET | Refills: 1 | Status: SHIPPED | OUTPATIENT
Start: 2019-02-04 | End: 2019-08-05

## 2019-02-05 NOTE — TELEPHONE ENCOUNTER
Refill passed per [address] [facility] protocol.   Hypothyroid Medications  Protocol Criteria:  Appointment scheduled in the past 12 months or the next 3 months  TSH resulted in the past 12 months that is normal  Recent Outpatient Visits            2 weeks ago Mixed hyperlipidemia    [address], [address], Nicky Bethea MD    Office Visit    3 weeks ago     [facility] in [place], [place]    Office Visit    3 weeks ago     [address] [place] [address] in [place], [place]    Office Visit    1 month ago     [address] [place] [address] [facility]    2 months ago Cervical radiculitis    [facility] in [place], [place]    Office Visit        Future Appointments       Provider Department Appt Notes    In 3 days Andi Bradshaw, [address] in [address]  I59.85  Cervical radiculitis  G56.02 Left carpal tunnel syndrome  G56.23  Cubital tunnel syndrome, bilateral    In 4 days Andi Bradshaw,  [facility] in [address]  I48.05  Cervical radiculitis  G56.02 Left carpal tunnel syndrome  G56.23  Cubital tunnel syndrome, bilateral    In 1 week Andi Bradshaw, PT [facility] in [address]  U06.35  Cervical radiculitis  G56.02 Left carpal tunnel syndrome  G56.23  Cubital tunnel syndrome, bilateral    In 2 weeks Andi Bradshaw, PT [facility] in [address]  T84.12  Cervical radiculitis  G56.02 Left carpal tunnel syndrome  G56.23  Cubital tunnel syndrome, bilateral        Lab Results   Component Value Date    TSH 3.38 11/30/2018    THYROIDFUNC 2.16 10/17/2015

## 2019-02-07 ENCOUNTER — OFFICE VISIT (OUTPATIENT)
Dept: PHYSICAL THERAPY | Age: 64
End: 2019-02-07
Attending: INTERNAL MEDICINE
Payer: COMMERCIAL

## 2019-02-07 PROCEDURE — 97110 THERAPEUTIC EXERCISES: CPT

## 2019-02-07 NOTE — PROGRESS NOTES
Patient Name: Keyanna Michael [place]: 11/27/1955, MRN: [id number]   Date:  2/7/2019  Referring Physician:  Rekha Hillman    Diagnosis: Cervical radiculitis (M54.12)  Left carpal tunnel syndrome (G56.02)  Cubital tunnel syndrome, bilateral (G56.23      Elmer Sylvester gains in therapy.:met  2. Pt will report decreased in pain by 50% or better to be able to do daily tasks with ease especially difficulty with using R arm :met  3.  Pt will demonstrate improved AROM on R shoulder in all motions to be able to return to daily

## 2019-02-08 ENCOUNTER — APPOINTMENT (OUTPATIENT)
Dept: PHYSICAL THERAPY | Age: 64
End: 2019-02-08
Attending: INTERNAL MEDICINE
Payer: COMMERCIAL

## 2019-02-11 ENCOUNTER — APPOINTMENT (OUTPATIENT)
Dept: PHYSICAL THERAPY | Age: 64
End: 2019-02-11
Attending: ORTHOPAEDIC SURGERY
Payer: COMMERCIAL

## 2019-02-14 NOTE — TELEPHONE ENCOUNTER
Requested Prescriptions     Pending Prescriptions Disp Refills   • PANTOPRAZOLE SODIUM 40 MG Oral Tab EC [Pharmacy Med Name: PANTOPRAZOLE SOD DR 40 MG TAB] 90 tablet 1     Sig: TAKE 1 TABLET BY MOUTH EVERY DAY IN THE MORNING BEFORE BREAKFAST     LOV 7/14/1

## 2019-02-18 RX ORDER — MAGNESIUM OXIDE 400 MG/1
TABLET ORAL
Qty: 30 TABLET | Refills: 3 | Status: SHIPPED | OUTPATIENT
Start: 2019-02-18 | End: 2019-08-01

## 2019-02-21 ENCOUNTER — APPOINTMENT (OUTPATIENT)
Dept: PHYSICAL THERAPY | Age: 64
End: 2019-02-21
Attending: ORTHOPAEDIC SURGERY
Payer: COMMERCIAL

## 2019-02-25 RX ORDER — PANTOPRAZOLE SODIUM 40 MG/1
TABLET, DELAYED RELEASE ORAL
Qty: 90 TABLET | Refills: 1 | Status: SHIPPED | OUTPATIENT
Start: 2019-02-25 | End: 2019-08-06

## 2019-03-16 ENCOUNTER — LAB ENCOUNTER (OUTPATIENT)
Dept: LAB | Age: 64
End: 2019-03-16
Attending: INTERNAL MEDICINE
Payer: COMMERCIAL

## 2019-03-16 DIAGNOSIS — Z79.4 DIABETES MELLITUS TYPE 2, INSULIN DEPENDENT (HCC): ICD-10-CM

## 2019-03-16 DIAGNOSIS — E11.9 DIABETES MELLITUS TYPE 2, INSULIN DEPENDENT (HCC): ICD-10-CM

## 2019-03-16 LAB
ALBUMIN SERPL-MCNC: 3.7 G/DL (ref 3.4–5)
ALBUMIN/GLOB SERPL: 1 {RATIO} (ref 1–2)
ALP LIVER SERPL-CCNC: 56 U/L (ref 50–130)
ALT SERPL-CCNC: 38 U/L (ref 13–56)
ANION GAP SERPL CALC-SCNC: 5 MMOL/L (ref 0–18)
AST SERPL-CCNC: 24 U/L (ref 15–37)
BASOPHILS # BLD AUTO: 0.08 X10(3) UL (ref 0–0.2)
BASOPHILS NFR BLD AUTO: 1.5 %
BILIRUB SERPL-MCNC: 0.3 MG/DL (ref 0.1–2)
BUN BLD-MCNC: 20 MG/DL (ref 7–18)
BUN/CREAT SERPL: 18.5 (ref 10–20)
CALCIUM BLD-MCNC: 8.9 MG/DL (ref 8.5–10.1)
CHLORIDE SERPL-SCNC: 105 MMOL/L (ref 98–107)
CHOLEST SMN-MCNC: 128 MG/DL (ref ?–200)
CO2 SERPL-SCNC: 31 MMOL/L (ref 21–32)
CREAT BLD-MCNC: 1.08 MG/DL (ref 0.55–1.02)
DEPRECATED RDW RBC AUTO: 40 FL (ref 35.1–46.3)
EOSINOPHIL # BLD AUTO: 0.33 X10(3) UL (ref 0–0.7)
EOSINOPHIL NFR BLD AUTO: 6.3 %
ERYTHROCYTE [DISTWIDTH] IN BLOOD BY AUTOMATED COUNT: 13 % (ref 11–15)
EST. AVERAGE GLUCOSE BLD GHB EST-MCNC: 157 MG/DL (ref 68–126)
GLOBULIN PLAS-MCNC: 3.6 G/DL (ref 2.8–4.4)
GLUCOSE BLD-MCNC: 107 MG/DL (ref 70–99)
HBA1C MFR BLD HPLC: 7.1 % (ref ?–5.7)
HCT VFR BLD AUTO: 39 % (ref 35–48)
HDLC SERPL-MCNC: 45 MG/DL (ref 40–59)
HGB BLD-MCNC: 12.3 G/DL (ref 12–16)
IMM GRANULOCYTES # BLD AUTO: 0.01 X10(3) UL (ref 0–1)
IMM GRANULOCYTES NFR BLD: 0.2 %
LDLC SERPL CALC-MCNC: 62 MG/DL (ref ?–100)
LYMPHOCYTES # BLD AUTO: 1.92 X10(3) UL (ref 1–4)
LYMPHOCYTES NFR BLD AUTO: 36.5 %
M PROTEIN MFR SERPL ELPH: 7.3 G/DL (ref 6.4–8.2)
MCH RBC QN AUTO: 26.9 PG (ref 26–34)
MCHC RBC AUTO-ENTMCNC: 31.5 G/DL (ref 31–37)
MCV RBC AUTO: 85.2 FL (ref 80–100)
MONOCYTES # BLD AUTO: 0.67 X10(3) UL (ref 0.1–1)
MONOCYTES NFR BLD AUTO: 12.7 %
NEUTROPHILS # BLD AUTO: 2.25 X10 (3) UL (ref 1.5–7.7)
NEUTROPHILS # BLD AUTO: 2.25 X10(3) UL (ref 1.5–7.7)
NEUTROPHILS NFR BLD AUTO: 42.8 %
NONHDLC SERPL-MCNC: 83 MG/DL (ref ?–130)
OSMOLALITY SERPL CALC.SUM OF ELEC: 295 MOSM/KG (ref 275–295)
PLATELET # BLD AUTO: 411 10(3)UL (ref 150–450)
POTASSIUM SERPL-SCNC: 4.1 MMOL/L (ref 3.5–5.1)
RBC # BLD AUTO: 4.58 X10(6)UL (ref 3.8–5.3)
SODIUM SERPL-SCNC: 141 MMOL/L (ref 136–145)
TRIGL SERPL-MCNC: 105 MG/DL (ref 30–149)
VLDLC SERPL CALC-MCNC: 21 MG/DL (ref 0–30)
WBC # BLD AUTO: 5.3 X10(3) UL (ref 4–11)

## 2019-03-16 PROCEDURE — 36415 COLL VENOUS BLD VENIPUNCTURE: CPT

## 2019-03-16 PROCEDURE — 85025 COMPLETE CBC W/AUTO DIFF WBC: CPT

## 2019-03-16 PROCEDURE — 80053 COMPREHEN METABOLIC PANEL: CPT

## 2019-03-16 PROCEDURE — 80061 LIPID PANEL: CPT

## 2019-03-16 PROCEDURE — 83036 HEMOGLOBIN GLYCOSYLATED A1C: CPT

## 2019-03-25 RX ORDER — METFORMIN HYDROCHLORIDE 500 MG/1
TABLET, EXTENDED RELEASE ORAL
Qty: 180 TABLET | Refills: 0 | Status: SHIPPED | OUTPATIENT
Start: 2019-03-25 | End: 2019-11-07

## 2019-03-25 RX ORDER — ATORVASTATIN CALCIUM 10 MG/1
TABLET, FILM COATED ORAL
Qty: 90 TABLET | Refills: 0 | Status: SHIPPED | OUTPATIENT
Start: 2019-03-25 | End: 2019-08-09

## 2019-03-27 NOTE — TELEPHONE ENCOUNTER
Current Outpatient Medications:  Glucose Blood (ONETOUCH ULTRA BLUE) In Vitro Strip TEST TWICE A DAY Disp: 200 strip Rfl: 5       Per pharmacy: alt requested. Not covered for 2019. accu-chk brand preferred.

## 2019-03-28 RX ORDER — BLOOD SUGAR DIAGNOSTIC
STRIP MISCELLANEOUS
Qty: 200 STRIP | Refills: 0 | Status: SHIPPED | OUTPATIENT
Start: 2019-03-28 | End: 2023-03-13

## 2019-04-15 ENCOUNTER — OFFICE VISIT (OUTPATIENT)
Dept: INTERNAL MEDICINE CLINIC | Facility: CLINIC | Age: 64
End: 2019-04-15

## 2019-04-15 ENCOUNTER — LAB ENCOUNTER (OUTPATIENT)
Dept: LAB | Age: 64
End: 2019-04-15
Attending: INTERNAL MEDICINE
Payer: COMMERCIAL

## 2019-04-15 VITALS
HEIGHT: 62 IN | HEART RATE: 61 BPM | BODY MASS INDEX: 27.97 KG/M2 | RESPIRATION RATE: 16 BRPM | WEIGHT: 152 LBS | SYSTOLIC BLOOD PRESSURE: 136 MMHG | DIASTOLIC BLOOD PRESSURE: 80 MMHG

## 2019-04-15 DIAGNOSIS — M25.40 JOINT SWELLING: ICD-10-CM

## 2019-04-15 DIAGNOSIS — M25.40 JOINT SWELLING: Primary | ICD-10-CM

## 2019-04-15 DIAGNOSIS — E11.9 DIABETES MELLITUS TYPE 2, INSULIN DEPENDENT (HCC): ICD-10-CM

## 2019-04-15 DIAGNOSIS — I10 ESSENTIAL HYPERTENSION: ICD-10-CM

## 2019-04-15 DIAGNOSIS — E78.2 MIXED HYPERLIPIDEMIA: ICD-10-CM

## 2019-04-15 DIAGNOSIS — Z79.4 DIABETES MELLITUS TYPE 2, INSULIN DEPENDENT (HCC): ICD-10-CM

## 2019-04-15 PROCEDURE — 36415 COLL VENOUS BLD VENIPUNCTURE: CPT

## 2019-04-15 PROCEDURE — 99215 OFFICE O/P EST HI 40 MIN: CPT | Performed by: INTERNAL MEDICINE

## 2019-04-15 PROCEDURE — 99212 OFFICE O/P EST SF 10 MIN: CPT | Performed by: INTERNAL MEDICINE

## 2019-04-15 PROCEDURE — 83970 ASSAY OF PARATHORMONE: CPT

## 2019-04-15 PROCEDURE — 84550 ASSAY OF BLOOD/URIC ACID: CPT

## 2019-04-15 NOTE — PATIENT INSTRUCTIONS
Problem List Items Addressed This Visit        Unprioritized    Diabetes mellitus type 2, insulin dependent ([address]     Blood sugars have improved.   Patient is currently on glimepiride 2 mg 1 tablet daily, metformin 500 mg 2 times daily and NovoLog mix–55 un medications well. Continue the same dose of medications and recheck labs in about 4-5 months         Joint swelling - Primary     Soft cystic swellings along the dorsal aspect of the PIP joints of bilateral hands.   Mild tenderness to palpation and some st

## 2019-04-15 NOTE — ASSESSMENT & PLAN NOTE
Blood sugars have improved. Patient is currently on glimepiride 2 mg 1 tablet daily, metformin 500 mg 2 times daily and NovoLog mix–55 units in the morning and 50 units in the evening. EGFR slightly declined as well as elevated creatinine.   Patient has

## 2019-04-15 NOTE — ASSESSMENT & PLAN NOTE
Lipid panel and liver function tests look stable on atorvastatin at 10 mg daily and fenofibrate. She has tolerated her medications well.   Continue the same dose of medications and recheck labs in about 4-5 months

## 2019-04-15 NOTE — ASSESSMENT & PLAN NOTE
Soft cystic swellings along the dorsal aspect of the PIP joints of bilateral hands. Mild tenderness to palpation and some stiffness to movement. Slight flexion deformity noted. No history of gout.   Labs have been ordered today to evaluate for uric acid

## 2019-04-15 NOTE — PROGRESS NOTES
HPI:    Patient ID: Yoselyn Alfonso is a 61year old female. Labs discussed    Diabetes   She presents for her follow-up diabetic visit. She has type 2 diabetes mellitus. Her disease course has been stable. There are no hypoglycemic associated symptoms. of lipids. Compliance problems include adherence to diet and adherence to exercise.   Risk factors for coronary artery disease include hypertension, obesity, dyslipidemia, diabetes mellitus, a sedentary lifestyle and post-menopausal.   Hypertension   This i MOUTH EVERY DAY Disp: 30 tablet Rfl: 3   LEVOTHYROXINE SODIUM 50 MCG Oral Tab TAKE 1 TABLET BY MOUTH DAILY BEFORE BREAKFAST Disp: 90 tablet Rfl: 1   GLIMEPIRIDE 2 MG Oral Tab TAKE 1 TABLET BY MOUTH EVERY DAY WITH BREAKFAST.  Disp: 90 tablet Rfl: 1   HYDROXY Oral Tab 1 tablet by mouth twice a day when necessary Disp: 30 tablet Rfl: 0   GABAPENTIN 100 MG Oral Cap TAKE 1 CAPSULE BY MOUTH TWICE A DAY Disp: 180 capsule Rfl: 1     Allergies:No Known Allergies      04/15/19  1507   BP: 136/80   Pulse:    Resp: looks stable and well controlled on losartan hydrochlorothiazide 100/25-1 tablet daily and diltiazem 80 mg daily. Kidney functions checked recently does show some decline in the EGFR and elevated creatinine levels.   Patient does not drink adequate amount and some stiffness to movement. Slight flexion deformity noted. No history of gout. Labs have been ordered today to evaluate for uric acid levels as well as calcium and parathyroid hormone levels.   She has not been taking any over-the-counter calcium hernández

## 2019-04-15 NOTE — ASSESSMENT & PLAN NOTE
Blood pressure 136/80, pulse 61, resp. rate 16, height 5' 2\" (1.575 m), weight 152 lb (68.9 kg), not currently breastfeeding.     Blood pressure looks stable and well controlled on losartan hydrochlorothiazide 100/25-1 tablet daily and diltiazem 80 mg stephanie

## 2019-04-16 ENCOUNTER — HOSPITAL ENCOUNTER (OUTPATIENT)
Dept: GENERAL RADIOLOGY | Age: 64
Discharge: HOME | End: 2019-04-16
Attending: INTERNAL MEDICINE
Payer: COMMERCIAL

## 2019-04-16 DIAGNOSIS — M25.40 JOINT SWELLING: ICD-10-CM

## 2019-04-16 PROCEDURE — 73130 X-RAY EXAM OF HAND: CPT | Performed by: INTERNAL MEDICINE

## 2019-04-20 RX ORDER — GABAPENTIN 100 MG/1
CAPSULE ORAL
Qty: 180 CAPSULE | Refills: 1 | Status: SHIPPED | OUTPATIENT
Start: 2019-04-20 | End: 2019-10-27

## 2019-04-22 DIAGNOSIS — M25.40 JOINT SWELLING: ICD-10-CM

## 2019-04-22 DIAGNOSIS — E79.0 ELEVATED BLOOD URIC ACID LEVEL: Primary | ICD-10-CM

## 2019-04-22 NOTE — PROGRESS NOTES
Spoke with patient ,advised Dr Miller Antonio note and verbalized understanding. Lab generated. Dr Carty=your schedule is fully booked except 5/22/19 for TCM at 230 PM, can we use that for the FU OV for this patient?       Notes recorded by MD kitty Ji

## 2019-05-02 ENCOUNTER — HOSPITAL ENCOUNTER (OUTPATIENT)
Dept: GENERAL RADIOLOGY | Age: 64
Discharge: HOME | End: 2019-05-02
Attending: INTERNAL MEDICINE
Payer: COMMERCIAL

## 2019-05-02 ENCOUNTER — OFFICE VISIT (OUTPATIENT)
Dept: RHEUMATOLOGY | Facility: CLINIC | Age: 64
End: 2019-05-02

## 2019-05-02 VITALS
WEIGHT: 150 LBS | HEIGHT: 62 IN | BODY MASS INDEX: 27.6 KG/M2 | SYSTOLIC BLOOD PRESSURE: 157 MMHG | DIASTOLIC BLOOD PRESSURE: 74 MMHG | HEART RATE: 60 BPM

## 2019-05-02 DIAGNOSIS — M54.50 ACUTE MIDLINE LOW BACK PAIN WITHOUT SCIATICA: ICD-10-CM

## 2019-05-02 DIAGNOSIS — M67.441 GANGLION CYST OF FINGER OF RIGHT HAND: ICD-10-CM

## 2019-05-02 DIAGNOSIS — M25.441 FINGER JOINT SWELLING, RIGHT: Primary | ICD-10-CM

## 2019-05-02 PROCEDURE — 99212 OFFICE O/P EST SF 10 MIN: CPT | Performed by: INTERNAL MEDICINE

## 2019-05-02 PROCEDURE — 72110 X-RAY EXAM L-2 SPINE 4/>VWS: CPT | Performed by: INTERNAL MEDICINE

## 2019-05-02 PROCEDURE — 99244 OFF/OP CNSLTJ NEW/EST MOD 40: CPT | Performed by: INTERNAL MEDICINE

## 2019-05-02 PROCEDURE — 72202 X-RAY EXAM SI JOINTS 3/> VWS: CPT | Performed by: INTERNAL MEDICINE

## 2019-05-02 NOTE — PATIENT INSTRUCTIONS
1. Dr. Tierra Espinosa - for cyst removal   [address] [place] [address]  [place] [address], [facility]  Phone: [phone]    2. Check labs  3. Check lower back xray   4. Return to clinic in 2 months.

## 2019-05-02 NOTE — PROGRESS NOTES
Mónica Vanegas is a 61year old female who presents for No chief complaint on file. Jaziel Campos HPI:     I had the pleasure of seeing Mónica Vanegas on 5/2/2019 for evaluation.      She is a pleasant 61year old who has noticed she has swelling in her hands since Disp: 90 tablet Rfl: 1   GLIMEPIRIDE 2 MG Oral Tab TAKE 1 TABLET BY MOUTH EVERY DAY WITH BREAKFAST.  Disp: 90 tablet Rfl: 1   HYDROXYZINE HCL 10 MG Oral Tab TAKE 1 TABLET BY MOUTH EVERY DAY Disp: 90 tablet Rfl: 1   NOVOLOG MIX 70/30 FLEXPEN (70-30) 100 UNIT ULTRA 2) w/Device Does not apply Kit Test blood sugar twice daily.  Disp: 1 kit Rfl: 0      Past Medical History:   Diagnosis Date   • Arthritis    • Diverticular disease    • DUB (dysfunctional uterine bleeding)     Endometrial biopsy in    • Gracie Soto no nasal ulcers, no parotid swelling, no neck pain, no jaw pain, no temple pain  Eyes: No visual changes,   CVS: No chest pain, no heart disease  RS: No SOB, no Cough, No Pleurtic pain,   GI: No nausea, no vomiiting, no abominal pain, no hx of ulcer, no ga Absolute      1.00 - 4.00 x10(3) uL 1.92    Monocytes Absolute      0.10 - 1.00 x10(3) uL 0.67    Eosinophils Absolute      0.00 - 0.70 x10(3) uL 0.33    Basophils Absolute      0.00 - 0.20 x10(3) uL 0.08    Immature Granulocyte Absolute      0.00 - 1.00 x 4/16/2019 - right hand xray   CONCLUSION:      Distal interphalangeal joint predominant degenerative changes. Ulnar minus variance. 4/16/2019 - left hand xray   Distal interphalangeal joint predominant degenerative changes.      Mild ulnar minus xray   4. Return to clinic in 2 months.        Olena Lees MD  5/2/2019  9:02 AM

## 2019-05-15 RX ORDER — LOSARTAN POTASSIUM AND HYDROCHLOROTHIAZIDE 25; 100 MG/1; MG/1
TABLET ORAL
Qty: 90 TABLET | Refills: 1 | Status: SHIPPED | OUTPATIENT
Start: 2019-05-15 | End: 2020-01-29

## 2019-05-15 NOTE — TELEPHONE ENCOUNTER
Refill passed per [facility] protocol.     Hypertensive Medications  Protocol Criteria:  · Appointment scheduled in the past 6 months or in the next 3 months  · BMP or CMP in the past 12 months  · Creatinine result < 2  Recent Outpatient Visits

## 2019-05-25 ENCOUNTER — LAB ENCOUNTER (OUTPATIENT)
Dept: LAB | Age: 64
End: 2019-05-25
Attending: INTERNAL MEDICINE
Payer: COMMERCIAL

## 2019-05-25 DIAGNOSIS — M25.441 FINGER JOINT SWELLING, RIGHT: ICD-10-CM

## 2019-05-25 DIAGNOSIS — E11.9 DIABETES MELLITUS TYPE 2, INSULIN DEPENDENT (HCC): ICD-10-CM

## 2019-05-25 DIAGNOSIS — E79.0 ELEVATED BLOOD URIC ACID LEVEL: ICD-10-CM

## 2019-05-25 DIAGNOSIS — M25.40 JOINT SWELLING: ICD-10-CM

## 2019-05-25 DIAGNOSIS — Z79.4 DIABETES MELLITUS TYPE 2, INSULIN DEPENDENT (HCC): ICD-10-CM

## 2019-05-25 PROCEDURE — 36415 COLL VENOUS BLD VENIPUNCTURE: CPT

## 2019-05-25 PROCEDURE — 85652 RBC SED RATE AUTOMATED: CPT

## 2019-05-25 PROCEDURE — 86140 C-REACTIVE PROTEIN: CPT

## 2019-05-25 PROCEDURE — 80053 COMPREHEN METABOLIC PANEL: CPT

## 2019-05-25 PROCEDURE — 82043 UR ALBUMIN QUANTITATIVE: CPT

## 2019-05-25 PROCEDURE — 82570 ASSAY OF URINE CREATININE: CPT

## 2019-05-25 PROCEDURE — 83036 HEMOGLOBIN GLYCOSYLATED A1C: CPT

## 2019-05-25 PROCEDURE — 86200 CCP ANTIBODY: CPT

## 2019-05-25 PROCEDURE — 80061 LIPID PANEL: CPT

## 2019-05-25 PROCEDURE — 85025 COMPLETE CBC W/AUTO DIFF WBC: CPT

## 2019-05-25 PROCEDURE — 84443 ASSAY THYROID STIM HORMONE: CPT

## 2019-05-25 PROCEDURE — 84550 ASSAY OF BLOOD/URIC ACID: CPT

## 2019-05-25 PROCEDURE — 86431 RHEUMATOID FACTOR QUANT: CPT

## 2019-05-25 PROCEDURE — 81003 URINALYSIS AUTO W/O SCOPE: CPT

## 2019-06-26 NOTE — TELEPHONE ENCOUNTER
Also see Te from 5/1/2017 - Re: Pantoprazole - Betty scheduled an appt for pt on 7/14/2017 & requesting refills to be sent to pharm. Pls call. Thank you.       Current Outpatient Prescriptions:  Pantoprazole Sodium 40 MG Oral Tab EC Take 1 tablet (40 Fall with Harm Risk

## 2019-07-04 RX ORDER — GLIMEPIRIDE 2 MG/1
TABLET ORAL
Qty: 90 TABLET | Refills: 1 | Status: SHIPPED | OUTPATIENT
Start: 2019-07-04 | End: 2020-02-11

## 2019-07-04 NOTE — TELEPHONE ENCOUNTER
Refill passed per [facility] protocol. Requested Prescriptions   Pending Prescriptions Disp Refills   • GLIMEPIRIDE 2 MG Oral Tab [Pharmacy Med Name: GLIMEPIRIDE 2 MG TABLET] 90 tablet 1     Sig: TAKE 1 TABLET BY MOUTH EVERY DAY WITH BREAKFAST.

## 2019-07-13 RX ORDER — HYDROXYZINE HYDROCHLORIDE 10 MG/1
TABLET, FILM COATED ORAL
Qty: 90 TABLET | Refills: 1 | Status: SHIPPED | OUTPATIENT
Start: 2019-07-13 | End: 2020-01-30

## 2019-08-01 RX ORDER — DILTIAZEM HYDROCHLORIDE 180 MG/1
180 CAPSULE, EXTENDED RELEASE ORAL DAILY
Qty: 90 CAPSULE | Refills: 1 | Status: SHIPPED | OUTPATIENT
Start: 2019-08-01 | End: 2019-11-19 | Stop reason: ALTCHOICE

## 2019-08-02 RX ORDER — MAGNESIUM OXIDE 400 MG (241.3 MG MAGNESIUM) TABLET
TABLET
Qty: 90 TABLET | Refills: 1 | Status: SHIPPED | OUTPATIENT
Start: 2019-08-02 | End: 2020-03-30

## 2019-08-02 NOTE — TELEPHONE ENCOUNTER
Refill passed per [facility] protocol.   Hypertensive Medications  Protocol Criteria:  · Appointment scheduled in the past 6 months or in the next 3 months  · BMP or CMP in the past 12 months  · Creatinine result < 2  Recent Outpatient Visits

## 2019-08-02 NOTE — TELEPHONE ENCOUNTER
Please advise in regards to refill request. Thank You  Refill Protocol Appointment Criteria  · Appointment scheduled in the past 12 months or in the next 3 months  Recent Outpatient Visits            3 months ago Finger joint swelling, right    [facility]

## 2019-08-05 RX ORDER — FENOFIBRATE 134 MG/1
CAPSULE ORAL
Qty: 90 CAPSULE | Refills: 1 | Status: SHIPPED | OUTPATIENT
Start: 2019-08-05 | End: 2020-02-21

## 2019-08-05 RX ORDER — LEVOTHYROXINE SODIUM 0.05 MG/1
TABLET ORAL
Qty: 90 TABLET | Refills: 1 | Status: SHIPPED | OUTPATIENT
Start: 2019-08-05 | End: 2020-02-20

## 2019-08-06 RX ORDER — PANTOPRAZOLE SODIUM 40 MG/1
TABLET, DELAYED RELEASE ORAL
Qty: 90 TABLET | Refills: 1 | Status: SHIPPED | OUTPATIENT
Start: 2019-08-06 | End: 2019-08-12

## 2019-08-06 NOTE — TELEPHONE ENCOUNTER
Requested Prescriptions     Pending Prescriptions Disp Refills   • PANTOPRAZOLE SODIUM 40 MG Oral Tab EC [Pharmacy Med Name: PANTOPRAZOLE SOD DR 40 MG TAB] 90 tablet 1     Sig: TAKE 1 TABLET BY MOUTH EVERY DAY IN THE MORNING BEFORE BREAKFAST     LOV-  LR-2

## 2019-08-09 RX ORDER — ATORVASTATIN CALCIUM 10 MG/1
TABLET, FILM COATED ORAL
Qty: 90 TABLET | Refills: 1 | Status: SHIPPED | OUTPATIENT
Start: 2019-08-09 | End: 2020-05-19

## 2019-08-09 NOTE — TELEPHONE ENCOUNTER
Refill passed per [facility] protocol.     Cholesterol Medications  Protocol Criteria:  · Appointment scheduled in the past 12 months or in the next 3 months  · ALT & LDL on file in the past 12 months  · ALT result < 80  · LDL result <130   Recent Outp

## 2019-08-12 ENCOUNTER — OFFICE VISIT (OUTPATIENT)
Dept: INTERNAL MEDICINE CLINIC | Facility: CLINIC | Age: 64
End: 2019-08-12

## 2019-08-12 VITALS
WEIGHT: 151 LBS | SYSTOLIC BLOOD PRESSURE: 126 MMHG | DIASTOLIC BLOOD PRESSURE: 78 MMHG | HEIGHT: 62 IN | RESPIRATION RATE: 16 BRPM | HEART RATE: 59 BPM | BODY MASS INDEX: 27.79 KG/M2

## 2019-08-12 DIAGNOSIS — Z79.4 DIABETES MELLITUS TYPE 2, INSULIN DEPENDENT (HCC): ICD-10-CM

## 2019-08-12 DIAGNOSIS — R10.12 LEFT UPPER QUADRANT PAIN: ICD-10-CM

## 2019-08-12 DIAGNOSIS — N18.30 CKD STAGE 3 DUE TO TYPE 2 DIABETES MELLITUS (HCC): ICD-10-CM

## 2019-08-12 DIAGNOSIS — E78.2 MIXED HYPERLIPIDEMIA: Primary | ICD-10-CM

## 2019-08-12 DIAGNOSIS — E11.9 DIABETES MELLITUS TYPE 2, INSULIN DEPENDENT (HCC): ICD-10-CM

## 2019-08-12 DIAGNOSIS — E11.22 CKD STAGE 3 DUE TO TYPE 2 DIABETES MELLITUS (HCC): ICD-10-CM

## 2019-08-12 DIAGNOSIS — Z12.31 VISIT FOR SCREENING MAMMOGRAM: ICD-10-CM

## 2019-08-12 DIAGNOSIS — I10 ESSENTIAL HYPERTENSION: ICD-10-CM

## 2019-08-12 DIAGNOSIS — Z78.0 MENOPAUSE: ICD-10-CM

## 2019-08-12 PROCEDURE — 99214 OFFICE O/P EST MOD 30 MIN: CPT | Performed by: INTERNAL MEDICINE

## 2019-08-12 RX ORDER — CIPROFLOXACIN 500 MG/1
TABLET, FILM COATED ORAL
Qty: 10 TABLET | Refills: 0 | Status: SHIPPED | OUTPATIENT
Start: 2019-08-12 | End: 2019-11-11

## 2019-08-12 RX ORDER — METRONIDAZOLE 500 MG/1
TABLET ORAL
Qty: 10 TABLET | Refills: 0 | Status: SHIPPED | OUTPATIENT
Start: 2019-08-12 | End: 2019-11-11

## 2019-08-12 NOTE — ASSESSMENT & PLAN NOTE
Lipid panel and liver function test look stable on atorvastatin at 10 mg daily and fenofibrate. She has tolerated the medications well.   Continue same dose of medication and recheck labs prior to the next office visit

## 2019-08-12 NOTE — ASSESSMENT & PLAN NOTE
Stage III chronic kidney disease. Patient has been advised to avoid all Advil, Aleve, ibuprofen, naproxen. Her EGFR was at 55. Blood sugars are much better. Blood pressures are well controlled.   Advised to drink plenty of fluids, restrict salt in the d

## 2019-08-12 NOTE — PROGRESS NOTES
HPI:    Patient ID: Pedro Dunham is a 61year old female. Notes recorded by America Dunham MD on 6/18/2019 at 10:24 PM CDT  Blood counts look normal-no anemia.   The kidney functions remain slightly low and will need adequate hydration to reduce risks This is a chronic problem. The current episode started more than 1 year ago. The problem is controlled. Recent lipid tests were reviewed and are normal. Exacerbating diseases include chronic renal disease.  There are no known factors aggravating her hyper Genitourinary: Negative. Musculoskeletal: Positive for back pain. Skin: Negative. Allergic/Immunologic: Negative. Neurological: Positive for numbness (hands and feet). Hematological: Negative. Psychiatric/Behavioral: Negative. NEEDLE CARY U/F 32G X 4 MM Does not apply Misc USE AS DIRECTED TWICE A DAY Disp: 100 each Rfl: 2   Blood Glucose Monitoring Suppl (ONETOUCH ULTRA 2) w/Device Does not apply Kit Test blood sugar twice daily.  Disp: 1 kit Rfl: 0   CETIRIZINE 10 MG Oral Tab TA lower quadrant. There is guarding. There is no rebound and no CVA tenderness. Musculoskeletal: Normal range of motion. She exhibits no edema or tenderness. Lymphadenopathy:     She has no cervical adenopathy.    Neurological: She is alert and oriented t URINE    URINALYSIS, ROUTINE    OPHTHALMOLOGY - INTERNAL    CKD stage 3 due to type 2 diabetes mellitus (HCC)     Stage III chronic kidney disease. Patient has been advised to avoid all Advil, Aleve, ibuprofen, naproxen. Her EGFR was at 55.   Blood sugars Panel      Microalb/Creat Ratio, Random Urine      Urinalysis, Routine      Meds This Visit:  Requested Prescriptions     Signed Prescriptions Disp Refills   • Ciprofloxacin HCl (CIPRO) 500 MG Oral Tab 10 tablet 0     Si tablet by mouth twice a day   •

## 2019-08-12 NOTE — PATIENT INSTRUCTIONS
Problem List Items Addressed This Visit        Unprioritized    CKD stage 3 due to type 2 diabetes mellitus ([facility] [address]     Stage III chronic kidney disease. Patient has been advised to avoid all Advil, Aleve, ibuprofen, naproxen. Her EGFR was at 55.   Blood hernández has been persistent though low-grade over the past 5 to 7 days. She is not constipated. She did have a CT scan in 2017 which showed diverticular disease. Colonoscopy in 2011 per Dr. Hadley Egan also showed diverticular disease.   Prior to the episode she has be

## 2019-08-12 NOTE — ASSESSMENT & PLAN NOTE
Patient is currently on NovoLog Mix 55 units in the morning and 50 units in the evening with metformin 500 mg 2 times daily and glimepiride 2 mg once daily. Hemoglobin A1c looked stable–A1c at 7.3. EGFR fluctuating but better at this time.   Lipid panels

## 2019-08-12 NOTE — ASSESSMENT & PLAN NOTE
Recurrent episodes of left-sided upper and flank pain, 10 out of 10 which lasts for about 2 to 3 hours and gradually improves. This pain however has been persistent though low-grade over the past 5 to 7 days. She is not constipated.   She did have a CT sc

## 2019-08-12 NOTE — ASSESSMENT & PLAN NOTE
Blood pressure 126/78, pulse 59, resp. rate 16, height 5' 2\" (1.575 m), weight 151 lb (68.5 kg), not currently breastfeeding.   Blood pressure looks stable and well controlled on losartan hydrochlorothiazide 100/20 5-1 tablet daily and diltiazem 80 mg stephanie

## 2019-08-26 ENCOUNTER — HOSPITAL ENCOUNTER (OUTPATIENT)
Dept: CT IMAGING | Age: 64
Discharge: HOME | End: 2019-08-26
Attending: INTERNAL MEDICINE
Payer: COMMERCIAL

## 2019-08-26 DIAGNOSIS — R10.12 LEFT UPPER QUADRANT PAIN: ICD-10-CM

## 2019-08-26 LAB — CREAT BLD-MCNC: 1 MG/DL (ref 0.55–1.02)

## 2019-08-26 PROCEDURE — 74177 CT ABD & PELVIS W/CONTRAST: CPT | Performed by: INTERNAL MEDICINE

## 2019-08-26 PROCEDURE — 82565 ASSAY OF CREATININE: CPT

## 2019-09-05 ENCOUNTER — HOSPITAL ENCOUNTER (OUTPATIENT)
Dept: MAMMOGRAPHY | Age: 64
Discharge: HOME | End: 2019-09-05
Attending: INTERNAL MEDICINE
Payer: COMMERCIAL

## 2019-09-05 ENCOUNTER — HOSPITAL ENCOUNTER (OUTPATIENT)
Dept: BONE DENSITY | Age: 64
Discharge: HOME | End: 2019-09-05
Attending: INTERNAL MEDICINE
Payer: COMMERCIAL

## 2019-09-05 DIAGNOSIS — Z12.31 VISIT FOR SCREENING MAMMOGRAM: ICD-10-CM

## 2019-09-05 DIAGNOSIS — Z78.0 MENOPAUSE: ICD-10-CM

## 2019-09-05 PROCEDURE — 77067 SCR MAMMO BI INCL CAD: CPT | Performed by: INTERNAL MEDICINE

## 2019-09-05 PROCEDURE — 77063 BREAST TOMOSYNTHESIS BI: CPT | Performed by: INTERNAL MEDICINE

## 2019-09-05 PROCEDURE — 77080 DXA BONE DENSITY AXIAL: CPT | Performed by: INTERNAL MEDICINE

## 2019-09-09 NOTE — PROGRESS NOTES
Please send letter  Mammogram is normal.  Follow up in 12 months    RICHIE Gamino  Working with Dr. Chad Kiran

## 2019-09-23 ENCOUNTER — OFFICE VISIT (OUTPATIENT)
Dept: OPTOMETRY | Facility: CLINIC | Age: 64
End: 2019-09-23

## 2019-09-23 DIAGNOSIS — E11.9 TYPE 2 DIABETES MELLITUS WITHOUT COMPLICATION, WITH LONG-TERM CURRENT USE OF INSULIN (HCC): Primary | ICD-10-CM

## 2019-09-23 DIAGNOSIS — H52.03 HYPEROPIA WITH PRESBYOPIA, BILATERAL: ICD-10-CM

## 2019-09-23 DIAGNOSIS — Z79.4 TYPE 2 DIABETES MELLITUS WITHOUT COMPLICATION, WITH LONG-TERM CURRENT USE OF INSULIN (HCC): Primary | ICD-10-CM

## 2019-09-23 DIAGNOSIS — H25.13 AGE-RELATED NUCLEAR CATARACT OF BOTH EYES: ICD-10-CM

## 2019-09-23 DIAGNOSIS — H52.4 HYPEROPIA WITH PRESBYOPIA, BILATERAL: ICD-10-CM

## 2019-09-23 PROCEDURE — 92014 COMPRE OPH EXAM EST PT 1/>: CPT | Performed by: OPTOMETRIST

## 2019-09-23 PROCEDURE — 92015 DETERMINE REFRACTIVE STATE: CPT | Performed by: OPTOMETRIST

## 2019-09-23 NOTE — PROGRESS NOTES
Yoselyn Alfonso is a 61year old female. HPI:     HPI     Diabetic Eye Exam     Diabetes characteristics include Type 2, controlled with diet, on insulin and taking oral medications. Duration of 14 years. Number of years diabetic 15.   Number of years o Mother    • Heart Attack Mother 80        MI   • Diabetes Sister    • Lipids Other         Family H/o: Hyperlipidemia   • Glaucoma Neg        Social History: Social History    Tobacco Use      Smoking status: Never Smoker      Smokeless tobacco: Never Used NEEDLE CARY U/F) 32G X 4 MM Does not apply Misc USE AS DIRECTED TWICE A DAY Disp: 200 each Rfl: 3   Glucose Blood (ONETOUCH ULTRA BLUE) In Vitro Strip TEST TWICE A DAY Disp: 200 strip Rfl: 5   ONETOUCH DELICA LANCETS 43W Does not apply Misc 1 each by Does External Exam       Right Left    External Normal Normal          Slit Lamp Exam       Right Left    Lids/Lashes Normal Normal    Conjunctiva/Sclera Nasal/temp pinguecula Nasal/temp pinguecula    Cornea Clear Clear    Anterior Chamber Deep and quiet Deep a Follow up instructions:  Return in about 1 year (around 9/23/2020) for Diabetic Eye exam.    9/23/2019  Scribed by: Judee Bence

## 2019-09-23 NOTE — PATIENT INSTRUCTIONS
Age-related nuclear cataract of both eyes  No treatment is required. Will continue to observe. Hyperopia with presbyopia, bilateral  New glasses RX given to update as needed. Patient wants distance only glasses.       Type 2 diabetes mellitus without com

## 2019-10-17 NOTE — TELEPHONE ENCOUNTER
Unable to e-prescribe sig too long, please Fax. Please respond to pool: JORGE HARDY CF LPN/CMA          Refill passed per [facility], [facility] protocol.   Diabetes Medications  Protocol Criteria:  · Appointment scheduled in the past 6 months or the next 3 months

## 2019-10-21 RX ORDER — INSULIN ASPART 100 [IU]/ML
INJECTION, SUSPENSION SUBCUTANEOUS
Refills: 2 | OUTPATIENT
Start: 2019-10-21

## 2019-10-22 ENCOUNTER — LAB ENCOUNTER (OUTPATIENT)
Dept: LAB | Age: 64
End: 2019-10-22
Attending: INTERNAL MEDICINE
Payer: COMMERCIAL

## 2019-10-22 DIAGNOSIS — Z79.4 DIABETES MELLITUS TYPE 2, INSULIN DEPENDENT (HCC): ICD-10-CM

## 2019-10-22 DIAGNOSIS — E11.9 DIABETES MELLITUS TYPE 2, INSULIN DEPENDENT (HCC): ICD-10-CM

## 2019-10-22 PROCEDURE — 80053 COMPREHEN METABOLIC PANEL: CPT

## 2019-10-22 PROCEDURE — 81003 URINALYSIS AUTO W/O SCOPE: CPT

## 2019-10-22 PROCEDURE — 82043 UR ALBUMIN QUANTITATIVE: CPT

## 2019-10-22 PROCEDURE — 83036 HEMOGLOBIN GLYCOSYLATED A1C: CPT

## 2019-10-22 PROCEDURE — 85025 COMPLETE CBC W/AUTO DIFF WBC: CPT

## 2019-10-22 PROCEDURE — 36415 COLL VENOUS BLD VENIPUNCTURE: CPT

## 2019-10-22 PROCEDURE — 80061 LIPID PANEL: CPT

## 2019-10-22 PROCEDURE — 82570 ASSAY OF URINE CREATININE: CPT

## 2019-10-29 RX ORDER — GABAPENTIN 100 MG/1
CAPSULE ORAL
Qty: 180 CAPSULE | Refills: 1 | Status: SHIPPED | OUTPATIENT
Start: 2019-10-29 | End: 2021-09-17

## 2019-10-30 NOTE — TELEPHONE ENCOUNTER
Refill passed per [facility], [facility] protocol.   Refill Protocol Appointment Criteria  · Appointment scheduled in the past 6 months or in the next 3 months  Recent Outpatient Visits            1 month ago Type 2 diabetes mellitus without complication, with darrel

## 2019-11-08 RX ORDER — METFORMIN HYDROCHLORIDE 500 MG/1
TABLET, EXTENDED RELEASE ORAL
Qty: 180 TABLET | Refills: 1 | Status: SHIPPED | OUTPATIENT
Start: 2019-11-08 | End: 2020-08-17

## 2019-11-08 NOTE — TELEPHONE ENCOUNTER
Protocol passed. Per chart review dose has not changed. Refill sent.      Diabetes Medications  Protocol Criteria:  · Appointment scheduled in the past 6 months or the next 3 months  · A1C < 7.5 in the past 6 months  · Creatinine in the past 12 months  · Cr

## 2019-11-11 ENCOUNTER — OFFICE VISIT (OUTPATIENT)
Dept: INTERNAL MEDICINE CLINIC | Facility: CLINIC | Age: 64
End: 2019-11-11

## 2019-11-11 ENCOUNTER — TELEPHONE (OUTPATIENT)
Dept: INTERNAL MEDICINE CLINIC | Facility: CLINIC | Age: 64
End: 2019-11-11

## 2019-11-11 VITALS
HEIGHT: 62 IN | DIASTOLIC BLOOD PRESSURE: 95 MMHG | HEART RATE: 70 BPM | RESPIRATION RATE: 16 BRPM | SYSTOLIC BLOOD PRESSURE: 167 MMHG | WEIGHT: 152.31 LBS | BODY MASS INDEX: 28.03 KG/M2

## 2019-11-11 DIAGNOSIS — N18.30 CKD STAGE 3 DUE TO TYPE 2 DIABETES MELLITUS (HCC): ICD-10-CM

## 2019-11-11 DIAGNOSIS — E11.9 DIABETES MELLITUS TYPE 2, INSULIN DEPENDENT (HCC): Primary | ICD-10-CM

## 2019-11-11 DIAGNOSIS — I10 ESSENTIAL HYPERTENSION: ICD-10-CM

## 2019-11-11 DIAGNOSIS — E11.22 CKD STAGE 3 DUE TO TYPE 2 DIABETES MELLITUS (HCC): ICD-10-CM

## 2019-11-11 DIAGNOSIS — Z79.4 DIABETES MELLITUS TYPE 2, INSULIN DEPENDENT (HCC): Primary | ICD-10-CM

## 2019-11-11 DIAGNOSIS — Z23 NEED FOR VACCINATION: ICD-10-CM

## 2019-11-11 DIAGNOSIS — E78.2 MIXED HYPERLIPIDEMIA: ICD-10-CM

## 2019-11-11 PROCEDURE — 99214 OFFICE O/P EST MOD 30 MIN: CPT | Performed by: INTERNAL MEDICINE

## 2019-11-11 PROCEDURE — 90471 IMMUNIZATION ADMIN: CPT | Performed by: INTERNAL MEDICINE

## 2019-11-11 PROCEDURE — 90686 IIV4 VACC NO PRSV 0.5 ML IM: CPT | Performed by: INTERNAL MEDICINE

## 2019-11-11 NOTE — PATIENT INSTRUCTIONS
Problem List Items Addressed This Visit        Unprioritized    CKD stage 3 due to type 2 diabetes mellitus ([facility] [address]     Kidney function significantly improved. EGFR normal.  Continue to monitor. Blood pressure elevated due to being off medications today.

## 2019-11-11 NOTE — ASSESSMENT & PLAN NOTE
Kidney function significantly improved. EGFR normal.  Continue to monitor. Blood pressure elevated due to being off medications today. Recheck blood pressure–nurse visit requested.

## 2019-11-11 NOTE — PROGRESS NOTES
HPI:    Patient ID: Mariama Wilson is a 61year old female.     Notes recorded by Radha Carvajal MD on 11/10/2019 at 9:30 PM CST  Diabetes test-hemoglobin A1c looks stable at 7.4.  This however has been gradually rising from 7.1.  Will need to monitor blood episode started more than 1 year ago. The problem is controlled. Recent lipid tests were reviewed and are normal. Exacerbating diseases include chronic renal disease. There are no known factors aggravating her hyperlipidemia.  Current antihyperlipidemic jolene 10/04/2018  Flu vaccine is currently due. HENT: Negative. Eyes: Negative. Respiratory: Negative. Cardiovascular: Negative. Gastrointestinal: Negative. Endocrine: Negative. Genitourinary: Negative. Musculoskeletal: Negative.     Skin: twice daily. 200 strip 3   • BD PEN NEEDLE CARY U/F 32G X 4 MM Does not apply Misc USE AS DIRECTED TWICE A  each 2   • ESCITALOPRAM 5 MG Oral Tab TAKE 1 TABLET (5 MG TOTAL) BY MOUTH ONCE DAILY.  90 tablet 1   • Blood Glucose Monitoring Suppl (ONETOUC Coordination normal.   Skin: No rash noted. No erythema. Nursing note and vitals reviewed.              ASSESSMENT/PLAN:     Problem List Items Addressed This Visit        Unprioritized    Essential hypertension     Blood pressure (!) 167/95, pulse 70, re PRESERVATIVE FREE 0.5 ML          Return in about 4 months (around 3/11/2020).     PT UNDERSTANDS AND AGREES TO FOLLOW DIRECTIONS AND ADVICE    Orders Placed This Encounter      Comp Metabolic Panel (14)      CBC With Differential With Platelet      Hemoglo

## 2019-11-11 NOTE — ASSESSMENT & PLAN NOTE
Blood pressure (!) 167/95, pulse 70, resp. rate 16, height 5' 2\" (1.575 m), weight 152 lb 4.8 oz (69.1 kg), not currently breastfeeding.   Blood pressure elevated today as she has not taken her medications–on losartan hydrochlorothiazide 100/20 5-1 tablet

## 2019-11-11 NOTE — ASSESSMENT & PLAN NOTE
Hemoglobin A1c at 7.4, this was 7.3 last visit. Currently on NovoLog Mix 55 units with breakfast and 50 units with dinner. and metformin 500 mg twice daily ,glimepiride 2 mg once daily. Tolerating her current regimen well.   EGFR looks normal.  Blood pres

## 2019-11-11 NOTE — TELEPHONE ENCOUNTER
Patient called in requesting a follow up appointment from last office visit. She was advised to not wait to come back in for a follow up (first available is 1/14/20 currently). She states that she would prefer to come in with her  today 11/11 at 9am.      Please advise.

## 2019-11-19 ENCOUNTER — NURSE ONLY (OUTPATIENT)
Dept: INTERNAL MEDICINE CLINIC | Facility: CLINIC | Age: 64
End: 2019-11-19

## 2019-11-19 DIAGNOSIS — Z01.30 BLOOD PRESSURE CHECK: Primary | ICD-10-CM

## 2019-11-19 RX ORDER — AMLODIPINE BESYLATE 10 MG/1
10 TABLET ORAL DAILY
Qty: 90 TABLET | Refills: 1 | Status: SHIPPED | OUTPATIENT
Start: 2019-11-19 | End: 2020-05-26

## 2019-11-19 NOTE — PROGRESS NOTES
Pt is here today for a scheduled nurse visit. Pt name, , and allergies verified with pt. Pt is here for a blood pressure check. Pt blood pressure was checked on her left arm with a BP machine at 10:56 AM and the reading was 171/78 with a pulse of 67.  Pt

## 2019-11-28 NOTE — TELEPHONE ENCOUNTER
Please get more info from pharmacy if combo is on back order? Patient current script is for combo pill.     LOSARTAN POTASSIUM-HCTZ 100-25 MG Oral Tab

## 2019-11-28 NOTE — TELEPHONE ENCOUNTER
Refill passed per [facility], [facility] protocol.     Hypertensive Medications  Protocol Criteria:  · Appointment scheduled in the past 6 months or in the next 3 months  · BMP or CMP in the past 12 months  · Creatinine result < 2  Recent Outpatient Visits

## 2019-11-29 NOTE — TELEPHONE ENCOUNTER
Spoke to [employer] at [address] [facility] in [place]. He states that Losartan-HCTZ 100-25 mg is on back order. Please advise if separate scripts, Losartan 100 mg and HCTZ 25 mg, may be sent to pharmacy.

## 2019-11-30 RX ORDER — HYDROCHLOROTHIAZIDE 25 MG/1
TABLET ORAL
Qty: 90 TABLET | Refills: 1 | Status: SHIPPED | OUTPATIENT
Start: 2019-11-30 | End: 2021-05-18 | Stop reason: ALTCHOICE

## 2019-11-30 RX ORDER — LOSARTAN POTASSIUM 100 MG/1
TABLET ORAL
Qty: 90 TABLET | Refills: 1 | Status: SHIPPED | OUTPATIENT
Start: 2019-11-30 | End: 2020-06-02

## 2020-01-29 ENCOUNTER — OFFICE VISIT (OUTPATIENT)
Dept: INTERNAL MEDICINE CLINIC | Facility: CLINIC | Age: 65
End: 2020-01-29

## 2020-01-29 VITALS
SYSTOLIC BLOOD PRESSURE: 162 MMHG | WEIGHT: 152 LBS | HEIGHT: 62 IN | RESPIRATION RATE: 16 BRPM | BODY MASS INDEX: 27.97 KG/M2 | DIASTOLIC BLOOD PRESSURE: 77 MMHG | HEART RATE: 71 BPM

## 2020-01-29 DIAGNOSIS — I10 ESSENTIAL HYPERTENSION: Primary | ICD-10-CM

## 2020-01-29 DIAGNOSIS — M76.31 ILIOTIBIAL BAND SYNDROME OF RIGHT SIDE: ICD-10-CM

## 2020-01-29 PROCEDURE — 99213 OFFICE O/P EST LOW 20 MIN: CPT | Performed by: INTERNAL MEDICINE

## 2020-01-29 RX ORDER — CYCLOBENZAPRINE HCL 10 MG
10 TABLET ORAL NIGHTLY PRN
Qty: 20 TABLET | Refills: 0 | Status: SHIPPED | OUTPATIENT
Start: 2020-01-29 | End: 2020-02-10

## 2020-01-29 NOTE — PATIENT INSTRUCTIONS
Iliotibial Band Stretch (Flexibility)    1. Stand next to a chair. Hold onto the chair with your right hand for support. Cross your right leg behind your left leg. 2. Lean your right hip toward the right. Feel the stretch at the outside of your hip.   3. You may be able to prevent IT band syndrome if you:  · Run on even surfaces  · Replace your running shoes often  · Ease up on your training  · On a track, make sure you run in both directions  · Have an expert check your stance for running and other sporti Researchers are still learning the exact cause of IT band syndrome. The pain may be caused by the IT band rubbing over the lower outer edge of the femur. This may cause inflammation in the bone, tendons, and small fluid-filled sacs (bursa) in the area.  The © 9561-1425 [employer] [address]. [address] [facility], [facility] [place]. All rights reserved. This information is not intended as a substitute for professional medical care. Always follow your healthcare professional's instructions.     Advised to

## 2020-01-29 NOTE — PROGRESS NOTES
Mónica Vanegas is a 59year old female.   Patient presents with:  Pain: Patient present with painful knees      HPI:   Patient comes as an urgent visit here with    C/C knee pain --chronic   C/o knee pain up to the hip 10/10 - took 2 advils but no he capsule 1   • LEVOTHYROXINE SODIUM 50 MCG Oral Tab TAKE 1 TABLET BY MOUTH DAILY BEFORE BREAKFAST 90 tablet 1   • magnesium oxide 400 MG Oral Tab TAKE 1 TABLET BY MOUTH EVERY DAY 90 tablet 1   • HYDROXYZINE HCL 10 MG Oral Tab TAKE 1 TABLET BY MOUTH EVERY DA Past Surgical History:   Procedure Laterality Date   • Appendectomy     •   ,    • Colonoscopy      Negative   • Colonoscopy & polypectomy  2010    Colonoscopy with Polypectomy and Biopsy, per NG   • Endometrial bx conjunct w/c spasms.  -     PHYSICAL THERAPY - INTERNAL  -     PHYSIATRY - INTERNAL    Advised to take ibuprofen 200 mg twice daily to 3 times daily after meals, will give muscle relaxants and she is aware of the side effects including dizziness and to take this only a

## 2020-01-30 RX ORDER — HYDROXYZINE HYDROCHLORIDE 10 MG/1
TABLET, FILM COATED ORAL
Qty: 90 TABLET | Refills: 1 | Status: SHIPPED | OUTPATIENT
Start: 2020-01-30 | End: 2020-08-16

## 2020-01-31 NOTE — TELEPHONE ENCOUNTER
Review pended refill request as it does not fall under a protocol.   Requested Prescriptions     Pending Prescriptions Disp Refills   • HYDROXYZINE HCL 10 MG Oral Tab [Pharmacy Med Name: HYDROXYZINE HCL 10 MG TABLET] 90 tablet 1     Sig: TAKE 1 TABLET BY MO

## 2020-02-02 RX ORDER — DILTIAZEM HYDROCHLORIDE 180 MG/1
180 CAPSULE, EXTENDED RELEASE ORAL DAILY
Qty: 90 CAPSULE | Refills: 1 | OUTPATIENT
Start: 2020-02-02

## 2020-02-02 NOTE — TELEPHONE ENCOUNTER
Refill passed per [facility], [facility] protocol. However rx d/c 11/19/19 during nurse visit. rx changes were made.     Requested Prescriptions   Pending Prescriptions Disp Refills   • DILTIAZEM HCL ER BEADS 180 MG Oral Capsule SR 24 Hr [Pharmacy Med Name: Santo Mcgill

## 2020-02-10 DIAGNOSIS — M76.31 ILIOTIBIAL BAND SYNDROME OF RIGHT SIDE: ICD-10-CM

## 2020-02-11 RX ORDER — GLIMEPIRIDE 2 MG/1
TABLET ORAL
Qty: 90 TABLET | Refills: 1 | Status: SHIPPED | OUTPATIENT
Start: 2020-02-11 | End: 2020-08-17

## 2020-02-11 NOTE — TELEPHONE ENCOUNTER
Review pended refill request as it does not fall under a protocol.   Requested Prescriptions     Pending Prescriptions Disp Refills   • CYCLOBENZAPRINE 10 MG Oral Tab [Pharmacy Med Name: CYCLOBENZAPRINE 10 MG TABLET] 20 tablet 0     Sig: TAKE 1 TABLET BY MO

## 2020-02-12 RX ORDER — CYCLOBENZAPRINE HCL 10 MG
TABLET ORAL
Qty: 20 TABLET | Refills: 1 | Status: SHIPPED | OUTPATIENT
Start: 2020-02-12 | End: 2021-05-18 | Stop reason: ALTCHOICE

## 2020-02-12 NOTE — TELEPHONE ENCOUNTER
Refill passed per [facility], [facility] protocol.   Diabetes Medications  Protocol Criteria:  · Appointment scheduled in the past 6 months or the next 3 months  · A1C < 7.5 in the past 6 months  · Creatinine in the past 12 months  · Creatinine result < 1.5   Rece

## 2020-02-20 RX ORDER — LEVOTHYROXINE SODIUM 0.05 MG/1
TABLET ORAL
Qty: 90 TABLET | Refills: 1 | Status: SHIPPED | OUTPATIENT
Start: 2020-02-20 | End: 2020-08-16

## 2020-02-20 RX ORDER — PANTOPRAZOLE SODIUM 40 MG/1
TABLET, DELAYED RELEASE ORAL
Qty: 90 TABLET | Refills: 1 | Status: SHIPPED | OUTPATIENT
Start: 2020-02-20 | End: 2020-08-17

## 2020-02-21 RX ORDER — FENOFIBRATE 134 MG/1
CAPSULE ORAL
Qty: 90 CAPSULE | Refills: 1 | Status: SHIPPED | OUTPATIENT
Start: 2020-02-21 | End: 2020-08-14

## 2020-02-21 NOTE — TELEPHONE ENCOUNTER
Refill passed per [facility], [facility] protocol.   Hypothyroid Medications  Protocol Criteria:  Appointment scheduled in the past 12 months or the next 3 months  TSH resulted in the past 12 months that is normal  Recent Outpatient Visits            3 weeks ago E

## 2020-02-26 ENCOUNTER — TELEPHONE (OUTPATIENT)
Dept: INTERNAL MEDICINE CLINIC | Facility: CLINIC | Age: 65
End: 2020-02-26

## 2020-03-05 ENCOUNTER — OFFICE VISIT (OUTPATIENT)
Dept: PHYSICAL THERAPY | Age: 65
End: 2020-03-05
Attending: INTERNAL MEDICINE
Payer: COMMERCIAL

## 2020-03-05 DIAGNOSIS — M76.31 ILIOTIBIAL BAND SYNDROME OF RIGHT SIDE: ICD-10-CM

## 2020-03-05 PROCEDURE — 97110 THERAPEUTIC EXERCISES: CPT

## 2020-03-05 PROCEDURE — 97161 PT EVAL LOW COMPLEX 20 MIN: CPT

## 2020-03-05 NOTE — PROGRESS NOTES
LOWER EXTREMITY EVALUATION:   Referring Physician: Dr. Radha Dawson  Dx:  Iliotibial band syndrome of right side (M76.31) Date of Service: 3/5/2020     PATIENT Consuelo Sidhu is a 59year old y/o female who presents to therapy today with complaints o hypertension 2004             ASSESSMENT:   Amanda Olivares presents to physical therapy evaluation with primary c/o B knee pain and soreness throughout her legs.  The results of the objective tests and measures show antalgic gait pattern with dec heel to toe, dec kne patellar mobilizations, quad set, prone knee bend quad stretch, whit stretch, standing quad stretch, long sit HS stretch  Patient was instructed in and issued a HEP for: quad stretches, hamstring stretch - handout issued    Charges: PT Dakota Rojas Complexit Date____________________    Certification From: 1/2/4721  To:6/3/2020

## 2020-03-06 ENCOUNTER — OFFICE VISIT (OUTPATIENT)
Dept: PHYSICAL THERAPY | Age: 65
End: 2020-03-06
Attending: INTERNAL MEDICINE
Payer: COMMERCIAL

## 2020-03-06 DIAGNOSIS — M76.31 ILIOTIBIAL BAND SYNDROME OF RIGHT SIDE: ICD-10-CM

## 2020-03-06 PROCEDURE — 97110 THERAPEUTIC EXERCISES: CPT

## 2020-03-06 NOTE — PROGRESS NOTES
Diagnosis: Iliotibial band syndrome of right side (M76.31)  Insurance (Authorized # of Visits):  [employer] (8 visits exp 5/4/20)  Authorizing Physician: Dr. Nickie Dinh  Next MD visit: none scheduled  Fall Risk: standard         Precautions: n/a           Cance pattern, equal weight shift and adequate hip/knee mobility  - in progress  4. Pt will be able to tolerate prolonged ambulation without reported pain - in progress            Charges:  TherEx x3       Total Timed Treatment: 40 min  Total Treatment Time: 40 m

## 2020-03-09 ENCOUNTER — OFFICE VISIT (OUTPATIENT)
Dept: PHYSICAL THERAPY | Age: 65
End: 2020-03-09
Attending: INTERNAL MEDICINE
Payer: COMMERCIAL

## 2020-03-09 DIAGNOSIS — M76.31 ILIOTIBIAL BAND SYNDROME OF RIGHT SIDE: ICD-10-CM

## 2020-03-09 PROCEDURE — 97110 THERAPEUTIC EXERCISES: CPT

## 2020-03-09 NOTE — PROGRESS NOTES
Diagnosis: Iliotibial band syndrome of right side (M76.31)  Insurance (Authorized # of Visits):  [employer] (8 visits exp 5/4/20)  Authorizing Physician: Dr. Zion Palacio  Next MD visit: none scheduled  Fall Risk: standard         Precautions: n/a           Cance Current HEP:   3/5:  quad stretches, hamstring stretch   3/6: leg press at gym - handout declined    Plan: Continue with progressive strengthening as tolerated. Progress glut strengthening. Goals     • Therapy Goals      Goals:    1.  Pt will verbali

## 2020-03-11 ENCOUNTER — OFFICE VISIT (OUTPATIENT)
Dept: PHYSICAL THERAPY | Age: 65
End: 2020-03-11
Attending: INTERNAL MEDICINE
Payer: COMMERCIAL

## 2020-03-11 DIAGNOSIS — M76.31 ILIOTIBIAL BAND SYNDROME OF RIGHT SIDE: ICD-10-CM

## 2020-03-11 PROCEDURE — 97110 THERAPEUTIC EXERCISES: CPT

## 2020-03-11 NOTE — PROGRESS NOTES
Diagnosis: Iliotibial band syndrome of right side (M76.31)  Insurance (Authorized # of Visits):  [employer] (8 visits exp 5/4/20)  Authorizing Physician: Dr. Nickie Dinh  Next MD visit: none scheduled  Fall Risk: standard         Precautions: n/a           Cance Current HEP:   3/5:  quad stretches, hamstring stretch   3/6: leg press at gym - handout declined    Plan: Continue with progressive strengthening as tolerated. Goals     • Therapy Goals      Goals:    1.  Pt will verbalize and demo compliance with

## 2020-03-17 ENCOUNTER — TELEPHONE (OUTPATIENT)
Dept: PHYSICAL THERAPY | Age: 65
End: 2020-03-17

## 2020-03-18 ENCOUNTER — APPOINTMENT (OUTPATIENT)
Dept: PHYSICAL THERAPY | Age: 65
End: 2020-03-18
Attending: INTERNAL MEDICINE
Payer: COMMERCIAL

## 2020-03-20 ENCOUNTER — APPOINTMENT (OUTPATIENT)
Dept: PHYSICAL THERAPY | Age: 65
End: 2020-03-20
Attending: INTERNAL MEDICINE
Payer: COMMERCIAL

## 2020-03-23 ENCOUNTER — APPOINTMENT (OUTPATIENT)
Dept: PHYSICAL THERAPY | Age: 65
End: 2020-03-23
Attending: INTERNAL MEDICINE
Payer: COMMERCIAL

## 2020-03-25 ENCOUNTER — APPOINTMENT (OUTPATIENT)
Dept: PHYSICAL THERAPY | Age: 65
End: 2020-03-25
Attending: INTERNAL MEDICINE
Payer: COMMERCIAL

## 2020-03-30 RX ORDER — MAGNESIUM OXIDE 400 MG (241.3 MG MAGNESIUM) TABLET
TABLET
Qty: 90 TABLET | Refills: 1 | Status: SHIPPED | OUTPATIENT
Start: 2020-03-30 | End: 2021-02-06

## 2020-04-11 ENCOUNTER — TELEPHONE (OUTPATIENT)
Dept: PHYSICAL THERAPY | Age: 65
End: 2020-04-11

## 2020-04-11 NOTE — TELEPHONE ENCOUNTER
Contacted pt to explain that due to continued COVID-19 outbreak, non-essential outpatient rehab patient care has been delayed until 5/4/20. Left VM informing patient that we will be cancelling all her appts until that point.  Asked pt to call back with gail

## 2020-04-15 ENCOUNTER — APPOINTMENT (OUTPATIENT)
Dept: PHYSICAL THERAPY | Age: 65
End: 2020-04-15
Attending: INTERNAL MEDICINE
Payer: COMMERCIAL

## 2020-04-20 ENCOUNTER — APPOINTMENT (OUTPATIENT)
Dept: PHYSICAL THERAPY | Age: 65
End: 2020-04-20
Attending: INTERNAL MEDICINE
Payer: COMMERCIAL

## 2020-04-22 ENCOUNTER — APPOINTMENT (OUTPATIENT)
Dept: PHYSICAL THERAPY | Age: 65
End: 2020-04-22
Attending: INTERNAL MEDICINE
Payer: COMMERCIAL

## 2020-04-29 ENCOUNTER — APPOINTMENT (OUTPATIENT)
Dept: PHYSICAL THERAPY | Age: 65
End: 2020-04-29
Attending: INTERNAL MEDICINE
Payer: COMMERCIAL

## 2020-05-01 ENCOUNTER — TELEPHONE (OUTPATIENT)
Dept: GASTROENTEROLOGY | Facility: CLINIC | Age: 65
End: 2020-05-01

## 2020-05-01 ENCOUNTER — APPOINTMENT (OUTPATIENT)
Dept: PHYSICAL THERAPY | Age: 65
End: 2020-05-01
Attending: INTERNAL MEDICINE
Payer: COMMERCIAL

## 2020-05-01 NOTE — TELEPHONE ENCOUNTER
LMTCB-    CSS please confirm PHONE appt for patient on 5/4/2020 at 2:30PM with Dr. Rola Albrecht, thank you.

## 2020-05-01 NOTE — TELEPHONE ENCOUNTER
Patient had telephone visit with me today, but when I called  said she is not home. I asked that the patient call me Monday 5/4 to set up Telephone visit for later that afternoon. [facility] staff.

## 2020-05-04 ENCOUNTER — VIRTUAL PHONE E/M (OUTPATIENT)
Dept: GASTROENTEROLOGY | Facility: CLINIC | Age: 65
End: 2020-05-04

## 2020-05-04 DIAGNOSIS — K21.9 GASTROESOPHAGEAL REFLUX DISEASE, ESOPHAGITIS PRESENCE NOT SPECIFIED: ICD-10-CM

## 2020-05-04 PROCEDURE — 99212 OFFICE O/P EST SF 10 MIN: CPT | Performed by: INTERNAL MEDICINE

## 2020-05-04 NOTE — TELEPHONE ENCOUNTER
2nd LMTCB-    CSS- please confirm PHONE appt for patient on 5/4/2020 at 2:30PM with Dr. Nimesh Diggs, thank you.

## 2020-05-04 NOTE — PROGRESS NOTES
Virtual Telephone Check-In    Kristofer Ac verbally consents to a Virtual/Telephone Check-In visit on 05/04/20. Patient understands and accepts financial responsibility for any deductible, co-insurance and/or co-pays associated with this service.     D

## 2020-05-04 NOTE — TELEPHONE ENCOUNTER
Per [facility], the patient has confirmed the appt below for today:  Future Appointments   Date Time Provider Aftab Whitmore   5/4/2020  2:30 PM Suleman Rascon MD [facility] Roni STEVE

## 2020-05-19 RX ORDER — ATORVASTATIN CALCIUM 10 MG/1
TABLET, FILM COATED ORAL
Qty: 90 TABLET | Refills: 1 | Status: SHIPPED | OUTPATIENT
Start: 2020-05-19 | End: 2020-11-12

## 2020-05-26 RX ORDER — AMLODIPINE BESYLATE 10 MG/1
TABLET ORAL
Qty: 90 TABLET | Refills: 1 | Status: SHIPPED | OUTPATIENT
Start: 2020-05-26 | End: 2020-11-12

## 2020-06-02 RX ORDER — LOSARTAN POTASSIUM 100 MG/1
TABLET ORAL
Qty: 90 TABLET | Refills: 1 | Status: SHIPPED | OUTPATIENT
Start: 2020-06-02 | End: 2020-12-11

## 2020-06-26 ENCOUNTER — LAB ENCOUNTER (OUTPATIENT)
Dept: LAB | Age: 65
End: 2020-06-26
Attending: INTERNAL MEDICINE
Payer: COMMERCIAL

## 2020-06-26 DIAGNOSIS — E11.9 DIABETES MELLITUS TYPE 2, INSULIN DEPENDENT (HCC): ICD-10-CM

## 2020-06-26 DIAGNOSIS — Z79.4 DIABETES MELLITUS TYPE 2, INSULIN DEPENDENT (HCC): ICD-10-CM

## 2020-06-26 PROCEDURE — 81001 URINALYSIS AUTO W/SCOPE: CPT

## 2020-06-26 PROCEDURE — 84443 ASSAY THYROID STIM HORMONE: CPT

## 2020-06-26 PROCEDURE — 80053 COMPREHEN METABOLIC PANEL: CPT

## 2020-06-26 PROCEDURE — 80061 LIPID PANEL: CPT

## 2020-06-26 PROCEDURE — 36415 COLL VENOUS BLD VENIPUNCTURE: CPT

## 2020-06-26 PROCEDURE — 85025 COMPLETE CBC W/AUTO DIFF WBC: CPT

## 2020-06-26 PROCEDURE — 83036 HEMOGLOBIN GLYCOSYLATED A1C: CPT

## 2020-06-26 PROCEDURE — 82043 UR ALBUMIN QUANTITATIVE: CPT

## 2020-06-26 PROCEDURE — 82570 ASSAY OF URINE CREATININE: CPT

## 2020-07-09 ENCOUNTER — TELEPHONE (OUTPATIENT)
Dept: INTERNAL MEDICINE CLINIC | Facility: CLINIC | Age: 65
End: 2020-07-09

## 2020-07-09 DIAGNOSIS — Z12.31 SCREENING MAMMOGRAM, ENCOUNTER FOR: Primary | ICD-10-CM

## 2020-07-09 DIAGNOSIS — Z12.39 BREAST CANCER SCREENING: ICD-10-CM

## 2020-07-09 NOTE — TELEPHONE ENCOUNTER
MARIIA - pt is coming due for subsequent mammo on 08/12/20. No order on file. Last [facility] - 08-12-19  LOV - 11-11-19    Please advise if ok to generate order (pend for sign off).

## 2020-07-18 ENCOUNTER — HOSPITAL ENCOUNTER (OUTPATIENT)
Dept: MAMMOGRAPHY | Age: 65
Discharge: HOME | End: 2020-07-18
Attending: INTERNAL MEDICINE
Payer: COMMERCIAL

## 2020-07-18 DIAGNOSIS — Z12.31 SCREENING MAMMOGRAM, ENCOUNTER FOR: ICD-10-CM

## 2020-07-23 ENCOUNTER — TELEPHONE (OUTPATIENT)
Dept: INTERNAL MEDICINE CLINIC | Facility: CLINIC | Age: 65
End: 2020-07-23

## 2020-08-14 RX ORDER — FENOFIBRATE 134 MG/1
CAPSULE ORAL
Qty: 90 CAPSULE | Refills: 1 | Status: SHIPPED | OUTPATIENT
Start: 2020-08-14 | End: 2021-02-14

## 2020-08-14 NOTE — TELEPHONE ENCOUNTER
Requested Prescriptions     Pending Prescriptions Disp Refills   • PANTOPRAZOLE SODIUM 40 MG Oral Tab EC [Pharmacy Med Name: PANTOPRAZOLE SOD DR 40 MG TAB] 90 tablet 1     Sig: TAKE 1 TABLET BY MOUTH EVERY DAY IN THE MORNING BEFORE BREAKFAST     Last seen

## 2020-08-16 RX ORDER — HYDROXYZINE HYDROCHLORIDE 10 MG/1
TABLET, FILM COATED ORAL
Qty: 90 TABLET | Refills: 1 | Status: SHIPPED | OUTPATIENT
Start: 2020-08-16 | End: 2021-02-17

## 2020-08-16 RX ORDER — LEVOTHYROXINE SODIUM 0.05 MG/1
TABLET ORAL
Qty: 90 TABLET | Refills: 1 | Status: SHIPPED | OUTPATIENT
Start: 2020-08-16 | End: 2021-02-17

## 2020-08-17 ENCOUNTER — TELEPHONE (OUTPATIENT)
Dept: INTERNAL MEDICINE CLINIC | Facility: CLINIC | Age: 65
End: 2020-08-17

## 2020-08-17 RX ORDER — PANTOPRAZOLE SODIUM 40 MG/1
TABLET, DELAYED RELEASE ORAL
Qty: 90 TABLET | Refills: 1 | Status: SHIPPED | OUTPATIENT
Start: 2020-08-17 | End: 2021-03-22

## 2020-08-17 RX ORDER — GLIMEPIRIDE 2 MG/1
2 TABLET ORAL
Qty: 90 TABLET | Refills: 1 | Status: SHIPPED | OUTPATIENT
Start: 2020-08-17 | End: 2021-02-02

## 2020-08-17 RX ORDER — METFORMIN HYDROCHLORIDE 500 MG/1
TABLET, EXTENDED RELEASE ORAL
Qty: 180 TABLET | Refills: 1 | Status: SHIPPED | OUTPATIENT
Start: 2020-08-17 | End: 2021-02-14

## 2020-09-09 ENCOUNTER — HOSPITAL ENCOUNTER (OUTPATIENT)
Dept: MAMMOGRAPHY | Age: 65
Discharge: HOME | End: 2020-09-09
Attending: INTERNAL MEDICINE
Payer: COMMERCIAL

## 2020-09-09 PROCEDURE — 77063 BREAST TOMOSYNTHESIS BI: CPT | Performed by: INTERNAL MEDICINE

## 2020-09-09 PROCEDURE — 77067 SCR MAMMO BI INCL CAD: CPT | Performed by: INTERNAL MEDICINE

## 2020-09-18 ENCOUNTER — NURSE TRIAGE (OUTPATIENT)
Dept: INTERNAL MEDICINE CLINIC | Facility: CLINIC | Age: 65
End: 2020-09-18

## 2020-09-18 NOTE — TELEPHONE ENCOUNTER
Action Requested: Summary for Provider     []  Critical Lab, Recommendations Needed  [] Need Additional Advice  []   FYI    []   Need Orders  [] Need Medications Sent to Pharmacy  []  Other     SUMMARY: Dr Sharyle Mealing: patient is requesting pain medication.   Kellie Victoria Agree with above, no endoscopic intervention.

## 2020-09-18 NOTE — TELEPHONE ENCOUNTER
Patient was called.    Agreed to come into office  Appointment with Dr. Zamzam Marcano 09/19      Future Appointments   Date Time Provider Aftab Gregoryi   9/19/2020  8:45 AM [facility]   10/29/2020  8:30 AM Tamanna Vallejo MD [facility]

## 2020-09-19 ENCOUNTER — OFFICE VISIT (OUTPATIENT)
Dept: INTERNAL MEDICINE CLINIC | Facility: CLINIC | Age: 65
End: 2020-09-19

## 2020-09-19 ENCOUNTER — HOSPITAL ENCOUNTER (OUTPATIENT)
Dept: GENERAL RADIOLOGY | Age: 65
Discharge: HOME | End: 2020-09-19
Attending: INTERNAL MEDICINE
Payer: COMMERCIAL

## 2020-09-19 VITALS
TEMPERATURE: 98 F | WEIGHT: 152.63 LBS | DIASTOLIC BLOOD PRESSURE: 75 MMHG | HEART RATE: 68 BPM | SYSTOLIC BLOOD PRESSURE: 137 MMHG | HEIGHT: 62 IN | BODY MASS INDEX: 28.09 KG/M2

## 2020-09-19 DIAGNOSIS — M79.604 RIGHT LEG PAIN: ICD-10-CM

## 2020-09-19 DIAGNOSIS — M79.604 RIGHT LEG PAIN: Primary | ICD-10-CM

## 2020-09-19 DIAGNOSIS — M79.601 RIGHT ARM PAIN: ICD-10-CM

## 2020-09-19 DIAGNOSIS — Z23 NEED FOR VACCINATION: ICD-10-CM

## 2020-09-19 PROCEDURE — 3078F DIAST BP <80 MM HG: CPT | Performed by: INTERNAL MEDICINE

## 2020-09-19 PROCEDURE — 73502 X-RAY EXAM HIP UNI 2-3 VIEWS: CPT | Performed by: INTERNAL MEDICINE

## 2020-09-19 PROCEDURE — 90686 IIV4 VACC NO PRSV 0.5 ML IM: CPT | Performed by: INTERNAL MEDICINE

## 2020-09-19 PROCEDURE — 90471 IMMUNIZATION ADMIN: CPT | Performed by: INTERNAL MEDICINE

## 2020-09-19 PROCEDURE — 3075F SYST BP GE 130 - 139MM HG: CPT | Performed by: INTERNAL MEDICINE

## 2020-09-19 PROCEDURE — 3008F BODY MASS INDEX DOCD: CPT | Performed by: INTERNAL MEDICINE

## 2020-09-19 PROCEDURE — 99214 OFFICE O/P EST MOD 30 MIN: CPT | Performed by: INTERNAL MEDICINE

## 2020-09-19 NOTE — PATIENT INSTRUCTIONS
Myofascial Pain Syndrome  Your pain is caused by a state of chronic muscle tension. This condition is called by various names: myofascial pain, fibrositis, and trigger point pain.  This can also be due to mechanical stress, such as working at a computer t · Apply an ice pack over the injured area for 15 to 20 minutes every 3 to 6 hours.  You should do this for the first 24 to 48 hours. You can make an ice pack by filling a plastic bag that seals at the top with ice cubes and then wrapping it with a thin towe · If your pain worsens, regardless of its location  [employer] last reviewed this educational content on 5/1/2018  © 2547-9057 [employer] [address]. [address] [facility], [address] [place] [address]. All rights reserved.  This information is not intended as a hernández

## 2020-09-22 NOTE — PROGRESS NOTES
Patient ID: Beata Hartley is a 59year old female. Patient presents with:  Pain: Pt states right leg pain, pt states pain shoots up to her right arm       HISTORY OF PRESENT ILLNESS:   HPI  Patient presents for above. Here for multiple issues.   She is • COLONOSCOPY  2007    Negative   • COLONOSCOPY & POLYPECTOMY  12/16/2010    Colonoscopy with Polypectomy and Biopsy, per NG   • ENDOMETRIAL BX CONJUNCT W/COLPOSCOPY     • LAPAROSCOPIC CHOLECYSTECTOMY  1991   • UPPER GI ENDOSCOPY,BIOPSY  2011    E (BD PEN NEEDLE CARY U/F) 32G X 4 MM Does not apply Misc, USE AS DIRECTED TWICE A DAY, Disp: 200 each, Rfl: 3  •  GABAPENTIN 100 MG Oral Cap, TAKE 1 CAPSULE BY MOUTH TWICE A DAY, Disp: 180 capsule, Rfl: 1  •  Glucose Blood (ACCU-CHEK ESHA PLUS) In [employer] Physical activity        Days per week: Not on file        Minutes per session: Not on file      Stress: Not on file    Relationships      Social connections        Talks on phone: Not on file        Gets together: Not on file        Attends [employer] Musculoskeletal:      Left upper arm: She exhibits tenderness. She exhibits no bony tenderness, no swelling, no edema and no deformity. Right upper leg: She exhibits tenderness.  She exhibits no bony tenderness, no swelling, no edema and no deformity

## 2020-09-24 ENCOUNTER — OFFICE VISIT (OUTPATIENT)
Dept: RHEUMATOLOGY | Facility: CLINIC | Age: 65
End: 2020-09-24

## 2020-09-24 VITALS
DIASTOLIC BLOOD PRESSURE: 88 MMHG | BODY MASS INDEX: 27.97 KG/M2 | HEIGHT: 62 IN | RESPIRATION RATE: 16 BRPM | SYSTOLIC BLOOD PRESSURE: 156 MMHG | WEIGHT: 152 LBS | HEART RATE: 69 BPM

## 2020-09-24 DIAGNOSIS — M70.61 TROCHANTERIC BURSITIS OF RIGHT HIP: ICD-10-CM

## 2020-09-24 DIAGNOSIS — M75.01 ADHESIVE CAPSULITIS OF RIGHT SHOULDER: Primary | ICD-10-CM

## 2020-09-24 PROCEDURE — 3008F BODY MASS INDEX DOCD: CPT | Performed by: INTERNAL MEDICINE

## 2020-09-24 PROCEDURE — 3077F SYST BP >= 140 MM HG: CPT | Performed by: INTERNAL MEDICINE

## 2020-09-24 PROCEDURE — 20610 DRAIN/INJ JOINT/BURSA W/O US: CPT | Performed by: INTERNAL MEDICINE

## 2020-09-24 PROCEDURE — 3079F DIAST BP 80-89 MM HG: CPT | Performed by: INTERNAL MEDICINE

## 2020-09-24 PROCEDURE — 99244 OFF/OP CNSLTJ NEW/EST MOD 40: CPT | Performed by: INTERNAL MEDICINE

## 2020-09-24 RX ORDER — TRIAMCINOLONE ACETONIDE 40 MG/ML
40 INJECTION, SUSPENSION INTRA-ARTICULAR; INTRAMUSCULAR ONCE
Status: COMPLETED | OUTPATIENT
Start: 2020-09-24 | End: 2020-09-24

## 2020-09-24 RX ADMIN — TRIAMCINOLONE ACETONIDE 40 MG: 40 INJECTION, SUSPENSION INTRA-ARTICULAR; INTRAMUSCULAR at 09:50:00

## 2020-09-24 NOTE — PROGRESS NOTES
Nena Rayo is a 59year old female who presents for Patient presents with:  Consult: Right leg pain, Right arm pain onset x2 months   . HPI:     I had the pleasure of seeing Nena Rayo on 9/24/2020 for evaluation.    She was referred by Dr. Gisele Archibald tablet 1   • fenofibrate micronized 134 MG Oral Cap TAKE 1 CAPSULE BY MOUTH EVERY DAY WITH FOOD 90 capsule 1   • Insulin Aspart Prot & Aspart 70/30 (NOVOLOG MIX 70/30 FLEXPEN) (70-30) 100 UNIT/ML SC SUPN INJECT 55 UNITS SUBCUTANEOUS IN THE MORNING AND 50 U necessary 30 tablet 0      Past Medical History:   Diagnosis Date   • Arthritis    • Diverticular disease    • DUB (dysfunctional uterine bleeding)     Endometrial biopsy in    • Gastritis    • Helicobacter pylori infection     EGD with Biopsy in 20 pain, no temple pain  Eyes: No visual changes,   CVS: No chest pain, no heart disease  RS: No SOB, no Cough, No Pleurtic pain,   GI: No nausea, no vomiiting, no abominal pain, no hx of ulcer, no gastritis, no heartburn, no dyshpagia, no BRBPR or melena   Absolute      0.10 - 1.00 x10(3) uL  0.69   Eosinophils Absolute      0.00 - 0.70 x10(3) uL  0.35   Basophils Absolute      0.00 - 0.20 x10(3) uL  0.08   Immature Granulocyte Absolute      0.00 - 1.00 x10(3) uL  0.01   Neutrophils %      %  53.7   Lymphocy Triglycerides      30 - 149 mg/dL 157 (H)    LDL Cholesterol Calc      <100 mg/dL 72    VLDL      0 - 30 mg/dL 31 (H)    NON HDL CHOL      <130 mg/dL 103    MALB URINE      mg/dL  3.19   CREATININE UR RANDOM      mg/dL  20.50   MALB/CRE CALC      <=30.0

## 2020-09-24 NOTE — PATIENT INSTRUCTIONS
1. Rest right shoulder x 3 days   2. Physical therapy for right shoulder - two visits to learn home exercises   3. Return to clinic as needed  4.  Take metformin 500mg extra for the next two days - call dr. Tone Worrell if sugars over 300

## 2020-09-25 ENCOUNTER — TELEPHONE (OUTPATIENT)
Dept: INTERNAL MEDICINE CLINIC | Facility: CLINIC | Age: 65
End: 2020-09-25

## 2020-09-25 NOTE — TELEPHONE ENCOUNTER
Increase the glimepiride to 2 mg 1-1/2 tablets daily for the next 1 week and then return to the original dose of 1 tablet daily, continue on metformin and NovoLog at the same doses.   Call if the blood sugars remain elevated after starting on higher dose of

## 2020-09-25 NOTE — TELEPHONE ENCOUNTER
Patient saw Dr. Lizabeth Frias yesterday and had steroid injection into right shoulder. Patient states that fasting blood sugar this morning was 300. She states typical fasting blood sugar for her is in the low 100's.      She is taking diabetic medication

## 2020-10-29 ENCOUNTER — TELEPHONE (OUTPATIENT)
Dept: INTERNAL MEDICINE CLINIC | Facility: CLINIC | Age: 65
End: 2020-10-29

## 2020-10-29 NOTE — TELEPHONE ENCOUNTER
Patient calling because she missed her video visit this morning. She will keep her phone with her. Number is [phone].

## 2020-10-30 ENCOUNTER — TELEPHONE (OUTPATIENT)
Dept: INTERNAL MEDICINE CLINIC | Facility: CLINIC | Age: 65
End: 2020-10-30

## 2020-10-30 NOTE — TELEPHONE ENCOUNTER
Pt's  called and requesting a COVID test for his wife. He just tested positive today for COVID. States wife is not symptomatic. The  disconnected the call in the middle of note.     Please advise

## 2020-11-02 ENCOUNTER — MED REC SCAN ONLY (OUTPATIENT)
Dept: INTERNAL MEDICINE CLINIC | Facility: CLINIC | Age: 65
End: 2020-11-02

## 2020-11-02 NOTE — TELEPHONE ENCOUNTER
Keshawn-son  [phone]. Contacted. Reviewed recent admission to [facility] with hypoglycemia. Covid testing completed during the hospitalization–pending. Patient admitted on October 30, 2020 and discharged on November 1.   Hypoglycemia precaution

## 2020-11-03 ENCOUNTER — VIRTUAL PHONE E/M (OUTPATIENT)
Dept: INTERNAL MEDICINE CLINIC | Facility: CLINIC | Age: 65
End: 2020-11-03

## 2020-11-03 DIAGNOSIS — N18.30 CKD STAGE 3 DUE TO TYPE 2 DIABETES MELLITUS (HCC): ICD-10-CM

## 2020-11-03 DIAGNOSIS — Z79.4 DIABETES MELLITUS TYPE 2, INSULIN DEPENDENT (HCC): Primary | ICD-10-CM

## 2020-11-03 DIAGNOSIS — E11.9 DIABETES MELLITUS TYPE 2, INSULIN DEPENDENT (HCC): Primary | ICD-10-CM

## 2020-11-03 DIAGNOSIS — E11.22 CKD STAGE 3 DUE TO TYPE 2 DIABETES MELLITUS (HCC): ICD-10-CM

## 2020-11-03 PROCEDURE — 99214 OFFICE O/P EST MOD 30 MIN: CPT | Performed by: INTERNAL MEDICINE

## 2020-11-03 NOTE — PROGRESS NOTES
HPI:    Patient ID: Nena Rayo is a 72year old female.   Telehealth outside of [facility] [address] Verbal Consent   I conducted a telehealth visit with Nena Rayo today, 11/03/20, which was completed using two-way, real-time interactive audio and v associated symptoms. There are no hypoglycemic complications. Symptoms are stable. There are no diabetic complications. Pertinent negatives for diabetic complications include no PVD.  Risk factors for coronary artery disease include diabetes mellitus, dysli Negative. HENT: Negative. Eyes: Negative. Respiratory: Negative. Cardiovascular: Negative. Gastrointestinal: Negative. Genitourinary: Negative. Musculoskeletal: Negative. Skin: Negative. Neurological: Positive for dizziness.    Ps GABAPENTIN 100 MG Oral Cap TAKE 1 CAPSULE BY MOUTH TWICE A  capsule 1   • Glucose Blood (ACCU-CHEK ESHA PLUS) In Vitro Strip Test glucose twice daily 200 strip 0   • Glucose Blood (ONETOUCH ULTRA BLUE) In Vitro Strip TEST TWICE A  strip 5 Items Addressed This Visit        Unprioritized    Diabetes mellitus type 2, insulin dependent ([facility] [address] - Primary     Recent admit [facility] with severe hypoglycemia, blood sugars of 40. Patient has a known history of erratic eating habits.   She is u

## 2020-11-03 NOTE — ASSESSMENT & PLAN NOTE
Recent admit [facility] with severe hypoglycemia, blood sugars of 40. Patient has a known history of erratic eating habits. She is unsure of what she had eaten and the amount of insulin that she had taken that day.   She has been taking her glimepi

## 2020-11-06 ENCOUNTER — TELEMEDICINE (OUTPATIENT)
Dept: INTERNAL MEDICINE CLINIC | Facility: CLINIC | Age: 65
End: 2020-11-06

## 2020-11-06 DIAGNOSIS — Z79.4 DIABETES MELLITUS TYPE 2, INSULIN DEPENDENT (HCC): Primary | ICD-10-CM

## 2020-11-06 DIAGNOSIS — E11.9 DIABETES MELLITUS TYPE 2, INSULIN DEPENDENT (HCC): Primary | ICD-10-CM

## 2020-11-06 PROCEDURE — 99213 OFFICE O/P EST LOW 20 MIN: CPT | Performed by: INTERNAL MEDICINE

## 2020-11-12 RX ORDER — AMLODIPINE BESYLATE 10 MG/1
TABLET ORAL
Qty: 90 TABLET | Refills: 1 | Status: SHIPPED | OUTPATIENT
Start: 2020-11-12 | End: 2021-05-10

## 2020-11-12 RX ORDER — ATORVASTATIN CALCIUM 10 MG/1
TABLET, FILM COATED ORAL
Qty: 90 TABLET | Refills: 1 | Status: SHIPPED | OUTPATIENT
Start: 2020-11-12 | End: 2021-05-10

## 2020-12-11 RX ORDER — LOSARTAN POTASSIUM 100 MG/1
TABLET ORAL
Qty: 90 TABLET | Refills: 1 | Status: SHIPPED | OUTPATIENT
Start: 2020-12-11 | End: 2021-06-07

## 2021-01-27 ENCOUNTER — NURSE TRIAGE (OUTPATIENT)
Dept: INTERNAL MEDICINE CLINIC | Facility: CLINIC | Age: 66
End: 2021-01-27

## 2021-01-27 NOTE — TELEPHONE ENCOUNTER
Action Requested: Summary for Provider     []  Critical Lab, Recommendations Needed  [] Need Additional Advice  []   FYI    []   Need Orders  [] Need Medications Sent to Pharmacy  []  Other     SUMMARY:  Per protocol advised office visit today.   Patient aw

## 2021-01-28 ENCOUNTER — TELEPHONE (OUTPATIENT)
Dept: INTERNAL MEDICINE CLINIC | Facility: CLINIC | Age: 66
End: 2021-01-28

## 2021-01-28 NOTE — TELEPHONE ENCOUNTER
Patient needs a note excusing her from work. Due to her pain in her arms she can to go to work. I scheduled her to see PCP for 2/02, per TE from 2/27. Please Advise.

## 2021-02-01 NOTE — ASSESSMENT & PLAN NOTE
Sugars are improved. Has been off the glimepiride. Dose of the novolog mix to as directed per sliding scale and has beeded about 24-35 units.   No low sugars  Has been on the same dose of metformin  Has been eating regular meals

## 2021-02-01 NOTE — PROGRESS NOTES
HPI:    Patient ID: Ilana Metzger is a 72year old female. Reviewed recent admission to [facility] with hypoglycemia. Covid testing completed during the hospitalization-pending.   Patient admitted on October 30, 2020 and discharged on November 1 inhibitor/angiotensin II receptor blocker is being taken. She sees a podiatrist.Eye exam is current. Review of Systems   Constitutional: Negative. HENT: Negative. Eyes: Negative. Respiratory: Negative. Cardiovascular: Negative.     Derrel Boxer Insulin Pen Needle (BD PEN NEEDLE CARY U/F) 32G X 4 MM Does not apply Misc USE AS DIRECTED TWICE A  each 3   • Insulin Pen Needle (BD PEN NEEDLE CARY U/F) 32G X 4 MM Does not apply Misc USE AS DIRECTED TWICE A  each 3   • GABAPENTIN 100 MG Or normal. Judgment and thought content normal.   Vitals reviewed. ASSESSMENT/PLAN:     Problem List Items Addressed This Visit        Unprioritized    Diabetes mellitus type 2, insulin dependent ([facility] [address] - Primary     Sugars are improved.   Has been

## 2021-02-02 ENCOUNTER — OFFICE VISIT (OUTPATIENT)
Dept: INTERNAL MEDICINE CLINIC | Facility: CLINIC | Age: 66
End: 2021-02-02

## 2021-02-02 VITALS
TEMPERATURE: 98 F | DIASTOLIC BLOOD PRESSURE: 70 MMHG | WEIGHT: 149 LBS | SYSTOLIC BLOOD PRESSURE: 132 MMHG | RESPIRATION RATE: 18 BRPM | HEIGHT: 62 IN | HEART RATE: 70 BPM | BODY MASS INDEX: 27.42 KG/M2

## 2021-02-02 DIAGNOSIS — M77.8 RIGHT SHOULDER TENDINITIS: ICD-10-CM

## 2021-02-02 DIAGNOSIS — E55.9 VITAMIN D DEFICIENCY: ICD-10-CM

## 2021-02-02 DIAGNOSIS — R07.2 PRECORDIAL CHEST PAIN: ICD-10-CM

## 2021-02-02 DIAGNOSIS — E78.2 MIXED HYPERLIPIDEMIA: ICD-10-CM

## 2021-02-02 DIAGNOSIS — Z79.4 DIABETES MELLITUS TYPE 2, INSULIN DEPENDENT (HCC): Primary | ICD-10-CM

## 2021-02-02 DIAGNOSIS — I10 ESSENTIAL HYPERTENSION: ICD-10-CM

## 2021-02-02 DIAGNOSIS — E11.9 DIABETES MELLITUS TYPE 2, INSULIN DEPENDENT (HCC): Primary | ICD-10-CM

## 2021-02-02 PROCEDURE — 99214 OFFICE O/P EST MOD 30 MIN: CPT | Performed by: INTERNAL MEDICINE

## 2021-02-02 PROCEDURE — 3078F DIAST BP <80 MM HG: CPT | Performed by: INTERNAL MEDICINE

## 2021-02-02 PROCEDURE — 3008F BODY MASS INDEX DOCD: CPT | Performed by: INTERNAL MEDICINE

## 2021-02-02 PROCEDURE — 3075F SYST BP GE 130 - 139MM HG: CPT | Performed by: INTERNAL MEDICINE

## 2021-02-02 NOTE — PATIENT INSTRUCTIONS
Problem List Items Addressed This Visit        Unprioritized    Diabetes mellitus type 2, insulin dependent ([facility] [address] - Primary     Currently on novolog mix 24-25 units on a sliding scale for sugars. Lows about 100 at most.  Occasional 80s.   She has not had a have been stable.   Recheck labs as directed and will follow up         Relevant Orders    CARD NUC STRESS TEST LEXISCAN (LMD=[id number]/)    Precordial chest pain     Patient with a history of precordial chest pain across the upper chest with tinglin

## 2021-02-02 NOTE — ASSESSMENT & PLAN NOTE
Currently on novolog mix 24-25 units on a sliding scale for sugars. Lows about 100 at most.  Occasional 80s. She has not had any low sugars as she has had in the past.  She has made an effort to eat 3 meals a day.   She has been checking her sugars at [place] [id number]

## 2021-02-02 NOTE — ASSESSMENT & PLAN NOTE
Lipid panel last completed showed both elevation in LDL cholesterol as well as triglycerides and she has been on atorvastatin with fenofibrate which she has tolerated well. Labs have been stable.   Recheck labs as directed and will follow up

## 2021-02-02 NOTE — ASSESSMENT & PLAN NOTE
Blood pressure 132/70, pulse 70, temperature 98 °F (36.7 °C), temperature source Oral, resp. rate 18, height 5' 2\" (1.575 m), weight 149 lb (67.6 kg), not currently breastfeeding.      Blood pressures look stable on losartan 100 mg daily, hydrochlorothiazi

## 2021-02-02 NOTE — ASSESSMENT & PLAN NOTE
Patient with a history of precordial chest pain across the upper chest with tingling and numbness into her arms. Random episodes, not exactly associated with exertion. Patient does not exercise on a regular basis or engage in heavy activity.   She works a

## 2021-02-02 NOTE — PROGRESS NOTES
HPI:    Patient ID: Kristofer Ac is a 72year old female. Labs pending, will need to discuss management of diabetes after available. Shoulder Pain   The pain is present in the right shoulder. This is a recurrent problem.  The current episode started following a diabetic, generally healthy, high fiber, low fiber and low salt diet. Meal planning includes avoidance of concentrated sweets and carbohydrate counting. She never participates in exercise. Her home blood glucose trend is decreasing steadily.  He Neurological: Positive for dizziness. Hematological: Negative. Psychiatric/Behavioral: Positive for confusion. The patient is nervous/anxious.              Current Outpatient Medications   Medication Sig Dispense Refill   • LOSARTAN 100 MG Oral Tab T 1   • METFORMIN HCL  MG Oral Tablet 24 Hr TAKE 2 TABLETS BY MOUTH EVERY DAY WITH EVENING MEAL 180 tablet 1   • Insulin Pen Needle (BD PEN NEEDLE CARY U/F) 32G X 4 MM Does not apply Misc USE AS DIRECTED TWICE A  each 3   • MAGNESIUM OXIDE 400 M Nursing note and vitals reviewed. ASSESSMENT/PLAN:     Problem List Items Addressed This Visit        Unprioritized    Essential hypertension     Blood pressure 132/70, pulse 70, temperature 98 °F (36.7 °C), temperature source Oral, resp.  rat Mixed hyperlipidemia     Lipid panel last completed showed both elevation in LDL cholesterol as well as triglycerides and she has been on atorvastatin with fenofibrate which she has tolerated well. Labs have been stable.   Recheck labs as directed and will Reflex To Free T4 [E]      Meds This Visit:  Requested Prescriptions      No prescriptions requested or ordered in this encounter       Imaging & Referrals:  PHYSICAL THERAPY - INTERNAL  PHYSIATRY - INTERNAL       [facility]#[id number]

## 2021-02-02 NOTE — ASSESSMENT & PLAN NOTE
Right shoulder pain, stiffness, difficulty with range of movement. No recent injuries. No tingling and numbness in the arms. Strength in the hands seems stable. She does have a history of cervical radiculopathy.   Advised to start on physical therapy an

## 2021-02-03 DIAGNOSIS — Z23 NEED FOR VACCINATION: ICD-10-CM

## 2021-02-05 ENCOUNTER — HOSPITAL ENCOUNTER (OUTPATIENT)
Dept: NUCLEAR MEDICINE | Facility: HOSPITAL | Age: 66
Discharge: HOME | End: 2021-02-05
Attending: INTERNAL MEDICINE
Payer: COMMERCIAL

## 2021-02-05 ENCOUNTER — HOSPITAL ENCOUNTER (OUTPATIENT)
Dept: CV DIAGNOSTICS | Facility: HOSPITAL | Age: 66
Discharge: HOME | End: 2021-02-05
Attending: INTERNAL MEDICINE
Payer: COMMERCIAL

## 2021-02-05 DIAGNOSIS — I10 ESSENTIAL HYPERTENSION: ICD-10-CM

## 2021-02-05 DIAGNOSIS — E11.9 DIABETES MELLITUS TYPE 2, INSULIN DEPENDENT (HCC): ICD-10-CM

## 2021-02-05 DIAGNOSIS — Z79.4 DIABETES MELLITUS TYPE 2, INSULIN DEPENDENT (HCC): ICD-10-CM

## 2021-02-05 DIAGNOSIS — R07.2 PRECORDIAL CHEST PAIN: ICD-10-CM

## 2021-02-05 DIAGNOSIS — E78.2 MIXED HYPERLIPIDEMIA: ICD-10-CM

## 2021-02-05 PROCEDURE — 78452 HT MUSCLE IMAGE SPECT MULT: CPT | Performed by: INTERNAL MEDICINE

## 2021-02-05 PROCEDURE — 93018 CV STRESS TEST I&R ONLY: CPT | Performed by: INTERNAL MEDICINE

## 2021-02-05 PROCEDURE — 93017 CV STRESS TEST TRACING ONLY: CPT | Performed by: INTERNAL MEDICINE

## 2021-02-05 PROCEDURE — 93016 CV STRESS TEST SUPVJ ONLY: CPT | Performed by: INTERNAL MEDICINE

## 2021-02-06 RX ORDER — MAGNESIUM OXIDE 400 MG (241.3 MG MAGNESIUM) TABLET
TABLET
Qty: 90 TABLET | Refills: 1 | Status: SHIPPED | OUTPATIENT
Start: 2021-02-06 | End: 2021-09-16

## 2021-02-11 RX ORDER — PEN NEEDLE, DIABETIC 32GX 5/32"
NEEDLE, DISPOSABLE MISCELLANEOUS
Qty: 200 EACH | Refills: 3 | Status: SHIPPED | OUTPATIENT
Start: 2021-02-11

## 2021-02-14 RX ORDER — METFORMIN HYDROCHLORIDE 500 MG/1
TABLET, EXTENDED RELEASE ORAL
Qty: 180 TABLET | Refills: 1 | Status: SHIPPED | OUTPATIENT
Start: 2021-02-14 | End: 2021-10-28

## 2021-02-14 RX ORDER — FENOFIBRATE 134 MG/1
CAPSULE ORAL
Qty: 90 CAPSULE | Refills: 1 | Status: SHIPPED | OUTPATIENT
Start: 2021-02-14 | End: 2021-06-09

## 2021-02-17 RX ORDER — LEVOTHYROXINE SODIUM 0.05 MG/1
TABLET ORAL
Qty: 90 TABLET | Refills: 1 | Status: SHIPPED | OUTPATIENT
Start: 2021-02-17 | End: 2021-08-20

## 2021-02-17 RX ORDER — HYDROXYZINE HYDROCHLORIDE 10 MG/1
TABLET, FILM COATED ORAL
Qty: 90 TABLET | Refills: 1 | Status: SHIPPED | OUTPATIENT
Start: 2021-02-17 | End: 2021-09-02

## 2021-02-18 ENCOUNTER — OFFICE VISIT (OUTPATIENT)
Dept: NEUROLOGY | Facility: CLINIC | Age: 66
End: 2021-02-18

## 2021-02-18 VITALS
WEIGHT: 148 LBS | RESPIRATION RATE: 20 BRPM | SYSTOLIC BLOOD PRESSURE: 152 MMHG | DIASTOLIC BLOOD PRESSURE: 78 MMHG | HEART RATE: 97 BPM | BODY MASS INDEX: 27.23 KG/M2 | HEIGHT: 62 IN | OXYGEN SATURATION: 98 %

## 2021-02-18 DIAGNOSIS — M25.511 RIGHT SHOULDER PAIN, UNSPECIFIED CHRONICITY: Primary | ICD-10-CM

## 2021-02-18 PROCEDURE — 3008F BODY MASS INDEX DOCD: CPT | Performed by: PHYSICAL MEDICINE & REHABILITATION

## 2021-02-18 PROCEDURE — 3078F DIAST BP <80 MM HG: CPT | Performed by: PHYSICAL MEDICINE & REHABILITATION

## 2021-02-18 PROCEDURE — 99244 OFF/OP CNSLTJ NEW/EST MOD 40: CPT | Performed by: PHYSICAL MEDICINE & REHABILITATION

## 2021-02-18 PROCEDURE — 3077F SYST BP >= 140 MM HG: CPT | Performed by: PHYSICAL MEDICINE & REHABILITATION

## 2021-02-18 RX ORDER — MELOXICAM 15 MG/1
TABLET ORAL
Qty: 30 TABLET | Refills: 0 | Status: SHIPPED | OUTPATIENT
Start: 2021-02-18 | End: 2021-03-15

## 2021-02-18 NOTE — H&P
[facility]    History and Physical    Charlotte Gonzalezutomaria Patient Status:  No patient class for patient encounter    1955 MRN [id number]   Location [place] [address] Attending No att. providers found   Hosp Day # 0 Osteoporosis screening 4/18/2012    per NG   • Other and unspecified hyperlipidemia 2002   • Pain in the abdomen    • Type II or unspecified type diabetes mellitus without mention of complication, not stated as uncontrolled 1999   • Unspecified essential h Constitutional: She appears well-developed and well-nourished. HENT:   Head: Normocephalic and atraumatic. Eyes: Conjunctivae and EOM are normal.   Cardiovascular: Intact distal pulses. Pulmonary/Chest: Effort normal.   Abdominal: Soft.  Musculoske the medication. She will remain off work for a week, and proceed with physical therapy as scheduled. X-ray of the right shoulder. If the symptoms persist consider MRI of the right shoulder. F/u in 4-6 weeks.     Nolene Saint, DO  2/18/2021

## 2021-02-19 ENCOUNTER — LAB ENCOUNTER (OUTPATIENT)
Dept: LAB | Age: 66
End: 2021-02-19
Attending: PHYSICAL MEDICINE & REHABILITATION
Payer: COMMERCIAL

## 2021-02-19 ENCOUNTER — HOSPITAL ENCOUNTER (OUTPATIENT)
Dept: GENERAL RADIOLOGY | Age: 66
Discharge: HOME | End: 2021-02-19
Attending: PHYSICAL MEDICINE & REHABILITATION
Payer: COMMERCIAL

## 2021-02-19 DIAGNOSIS — M25.511 RIGHT SHOULDER PAIN, UNSPECIFIED CHRONICITY: ICD-10-CM

## 2021-02-19 DIAGNOSIS — Z79.4 DIABETES MELLITUS TYPE 2, INSULIN DEPENDENT (HCC): ICD-10-CM

## 2021-02-19 DIAGNOSIS — E11.9 DIABETES MELLITUS TYPE 2, INSULIN DEPENDENT (HCC): ICD-10-CM

## 2021-02-19 LAB
ALBUMIN SERPL-MCNC: 3.8 G/DL (ref 3.4–5)
ALBUMIN/GLOB SERPL: 1.1 {RATIO} (ref 1–2)
ALP LIVER SERPL-CCNC: 61 U/L
ALT SERPL-CCNC: 32 U/L
ANION GAP SERPL CALC-SCNC: 5 MMOL/L (ref 0–18)
AST SERPL-CCNC: 20 U/L (ref 15–37)
BACTERIA UR QL AUTO: NEGATIVE /HPF
BASOPHILS # BLD AUTO: 0.11 X10(3) UL (ref 0–0.2)
BASOPHILS NFR BLD AUTO: 2.4 %
BILIRUB SERPL-MCNC: 0.3 MG/DL (ref 0.1–2)
BILIRUB UR QL: NEGATIVE
BUN BLD-MCNC: 13 MG/DL (ref 7–18)
BUN/CREAT SERPL: 13.8 (ref 10–20)
CALCIUM BLD-MCNC: 9.5 MG/DL (ref 8.5–10.1)
CHLORIDE SERPL-SCNC: 105 MMOL/L (ref 98–112)
CHOLEST SMN-MCNC: 156 MG/DL (ref ?–200)
CLARITY UR: CLEAR
CO2 SERPL-SCNC: 30 MMOL/L (ref 21–32)
COLOR UR: YELLOW
CREAT BLD-MCNC: 0.94 MG/DL
CREAT UR-SCNC: 97.8 MG/DL
DEPRECATED RDW RBC AUTO: 38.9 FL (ref 35.1–46.3)
EOSINOPHIL # BLD AUTO: 0.3 X10(3) UL (ref 0–0.7)
EOSINOPHIL NFR BLD AUTO: 6.5 %
ERYTHROCYTE [DISTWIDTH] IN BLOOD BY AUTOMATED COUNT: 12.9 % (ref 11–15)
EST. AVERAGE GLUCOSE BLD GHB EST-MCNC: 206 MG/DL (ref 68–126)
GLOBULIN PLAS-MCNC: 3.4 G/DL (ref 2.8–4.4)
GLUCOSE BLD-MCNC: 152 MG/DL (ref 70–99)
GLUCOSE UR-MCNC: NEGATIVE MG/DL
HBA1C MFR BLD HPLC: 8.8 % (ref ?–5.7)
HCT VFR BLD AUTO: 41.1 %
HDLC SERPL-MCNC: 46 MG/DL (ref 40–59)
HGB BLD-MCNC: 13.1 G/DL
HGB UR QL STRIP.AUTO: NEGATIVE
IMM GRANULOCYTES # BLD AUTO: 0.01 X10(3) UL (ref 0–1)
IMM GRANULOCYTES NFR BLD: 0.2 %
KETONES UR-MCNC: NEGATIVE MG/DL
LDLC SERPL CALC-MCNC: 80 MG/DL (ref ?–100)
LEUKOCYTE ESTERASE UR QL STRIP.AUTO: NEGATIVE
LYMPHOCYTES # BLD AUTO: 1.73 X10(3) UL (ref 1–4)
LYMPHOCYTES NFR BLD AUTO: 37.4 %
M PROTEIN MFR SERPL ELPH: 7.2 G/DL (ref 6.4–8.2)
MCH RBC QN AUTO: 26.6 PG (ref 26–34)
MCHC RBC AUTO-ENTMCNC: 31.9 G/DL (ref 31–37)
MCV RBC AUTO: 83.4 FL
MICROALBUMIN UR-MCNC: 8.16 MG/DL
MICROALBUMIN/CREAT 24H UR-RTO: 83.4 UG/MG (ref ?–30)
MONOCYTES # BLD AUTO: 0.5 X10(3) UL (ref 0.1–1)
MONOCYTES NFR BLD AUTO: 10.8 %
NEUTROPHILS # BLD AUTO: 1.97 X10 (3) UL (ref 1.5–7.7)
NEUTROPHILS # BLD AUTO: 1.97 X10(3) UL (ref 1.5–7.7)
NEUTROPHILS NFR BLD AUTO: 42.7 %
NITRITE UR QL STRIP.AUTO: NEGATIVE
NONHDLC SERPL-MCNC: 110 MG/DL (ref ?–130)
OSMOLALITY SERPL CALC.SUM OF ELEC: 293 MOSM/KG (ref 275–295)
PATIENT FASTING Y/N/NP: YES
PATIENT FASTING Y/N/NP: YES
PH UR: 7 [PH] (ref 5–8)
PLATELET # BLD AUTO: 412 10(3)UL (ref 150–450)
POTASSIUM SERPL-SCNC: 4.2 MMOL/L (ref 3.5–5.1)
PROT UR-MCNC: 30 MG/DL
RBC # BLD AUTO: 4.93 X10(6)UL
RBC #/AREA URNS AUTO: 1 /HPF
SODIUM SERPL-SCNC: 140 MMOL/L (ref 136–145)
SP GR UR STRIP: 1.02 (ref 1–1.03)
TRIGL SERPL-MCNC: 148 MG/DL (ref 30–149)
TSI SER-ACNC: 3.07 MIU/ML (ref 0.36–3.74)
UROBILINOGEN UR STRIP-ACNC: <2
VLDLC SERPL CALC-MCNC: 30 MG/DL (ref 0–30)
WBC # BLD AUTO: 4.6 X10(3) UL (ref 4–11)
WBC #/AREA URNS AUTO: 2 /HPF

## 2021-02-19 PROCEDURE — 85025 COMPLETE CBC W/AUTO DIFF WBC: CPT

## 2021-02-19 PROCEDURE — 80061 LIPID PANEL: CPT

## 2021-02-19 PROCEDURE — 81001 URINALYSIS AUTO W/SCOPE: CPT

## 2021-02-19 PROCEDURE — 82043 UR ALBUMIN QUANTITATIVE: CPT

## 2021-02-19 PROCEDURE — 73030 X-RAY EXAM OF SHOULDER: CPT | Performed by: PHYSICAL MEDICINE & REHABILITATION

## 2021-02-19 PROCEDURE — 36415 COLL VENOUS BLD VENIPUNCTURE: CPT

## 2021-02-19 PROCEDURE — 80053 COMPREHEN METABOLIC PANEL: CPT

## 2021-02-19 PROCEDURE — 82570 ASSAY OF URINE CREATININE: CPT

## 2021-02-19 PROCEDURE — 83036 HEMOGLOBIN GLYCOSYLATED A1C: CPT

## 2021-02-19 PROCEDURE — 84443 ASSAY THYROID STIM HORMONE: CPT

## 2021-02-22 ENCOUNTER — TELEPHONE (OUTPATIENT)
Dept: INTERNAL MEDICINE CLINIC | Facility: CLINIC | Age: 66
End: 2021-02-22

## 2021-02-22 ENCOUNTER — TELEPHONE (OUTPATIENT)
Dept: NEUROLOGY | Facility: CLINIC | Age: 66
End: 2021-02-22

## 2021-02-22 DIAGNOSIS — L85.3 DRY SKIN: Primary | ICD-10-CM

## 2021-02-22 NOTE — TELEPHONE ENCOUNTER
----- Message from Mayur Sultana DO sent at 2/22/2021  9:22 AM CST -----  Please let the patient know I've reviewed her XR of the shoulder; mild/minimal osteoarthritis in the small joint of the right shoulder, essentially normal.

## 2021-02-23 ENCOUNTER — TELEPHONE (OUTPATIENT)
Dept: NEUROLOGY | Facility: CLINIC | Age: 66
End: 2021-02-23

## 2021-02-23 ENCOUNTER — TELEPHONE (OUTPATIENT)
Dept: PHYSICAL THERAPY | Age: 66
End: 2021-02-23

## 2021-02-23 NOTE — TELEPHONE ENCOUNTER
Spoke to patient to inform her that her referral has been ordered and I provided her with  phone number

## 2021-02-23 NOTE — TELEPHONE ENCOUNTER
Spoke with patient who stated that she would like Duty status changed. As of now return to work date is 02/25/21 she would like to return 03/01/21. Verbally spoke with Dr. Esequiel Holder who stated it is ok to change date on duty status.     Duty status has b

## 2021-02-24 ENCOUNTER — OFFICE VISIT (OUTPATIENT)
Dept: PHYSICAL THERAPY | Age: 66
End: 2021-02-24
Attending: INTERNAL MEDICINE
Payer: COMMERCIAL

## 2021-02-24 DIAGNOSIS — M75.01 ADHESIVE CAPSULITIS OF RIGHT SHOULDER: ICD-10-CM

## 2021-02-24 PROCEDURE — 97162 PT EVAL MOD COMPLEX 30 MIN: CPT

## 2021-02-24 PROCEDURE — 97110 THERAPEUTIC EXERCISES: CPT

## 2021-02-24 NOTE — PROGRESS NOTES
SHOULDER EVALUATION:   Referring Physician: Dr. Arnie Rain  Diagnosis: Right shoulder tendinitis (M77.8)    Date of Service: 2/24/2021     PATIENT Crystal Ta is a 72year old female who presents to therapy today with complaints of R shoulder p 100*(standing)100*(supine);   L 120(standing)160(supine)  ER: R 51(supine); L 78  IR: R 63(supine); L 70   PROM: pt to guarded and unable to assess   Accessory motion: unable to test, pt to guarded and unable to assess      Strength/MMT: (* denotes perform Exercise and Home Exercise Program instruction    Education or treatment limitation: None  Rehab Potential:fair    FOTO: not completed     Patient/Family/Caregiver was advised of these findings, precautions, and treatment options and has agreed to actively

## 2021-03-04 ENCOUNTER — TELEPHONE (OUTPATIENT)
Dept: INTERNAL MEDICINE CLINIC | Facility: CLINIC | Age: 66
End: 2021-03-04

## 2021-03-04 ENCOUNTER — OFFICE VISIT (OUTPATIENT)
Dept: PHYSICAL THERAPY | Age: 66
End: 2021-03-04
Attending: INTERNAL MEDICINE
Payer: COMMERCIAL

## 2021-03-04 DIAGNOSIS — M76.31 ILIOTIBIAL BAND SYNDROME OF RIGHT SIDE: Primary | ICD-10-CM

## 2021-03-04 DIAGNOSIS — M75.01 ADHESIVE CAPSULITIS OF RIGHT SHOULDER: ICD-10-CM

## 2021-03-04 PROCEDURE — 97110 THERAPEUTIC EXERCISES: CPT

## 2021-03-04 PROCEDURE — 97112 NEUROMUSCULAR REEDUCATION: CPT

## 2021-03-04 NOTE — TELEPHONE ENCOUNTER
Dr. Max Webb, patient is requesting a referral to Dr. Manuelito Lewis. Referral has been pended, please advise.

## 2021-03-04 NOTE — TELEPHONE ENCOUNTER
Patient is requesting a referral to see Dr. Angélica Esteves in [address] [place] [address]. She is requesting a call when this is complete.

## 2021-03-04 NOTE — PROGRESS NOTES
Dx: Right shoulder tendinitis (M77.8)         Insurance (Authorized # of Visits):  8 HMO exp 5/9/21  POC visits: 15   Authorizing Physician: Dr. Yvette Concepcion  Next MD visit: none scheduled  Fall Risk: standard         Precautions:      Subjective: Pt states

## 2021-03-08 ENCOUNTER — APPOINTMENT (OUTPATIENT)
Dept: PHYSICAL THERAPY | Age: 66
End: 2021-03-08
Attending: INTERNAL MEDICINE
Payer: COMMERCIAL

## 2021-03-08 ENCOUNTER — TELEPHONE (OUTPATIENT)
Dept: PHYSICAL THERAPY | Facility: HOSPITAL | Age: 66
End: 2021-03-08

## 2021-03-10 ENCOUNTER — OFFICE VISIT (OUTPATIENT)
Dept: PHYSICAL THERAPY | Age: 66
End: 2021-03-10
Attending: INTERNAL MEDICINE
Payer: COMMERCIAL

## 2021-03-10 DIAGNOSIS — M75.01 ADHESIVE CAPSULITIS OF RIGHT SHOULDER: ICD-10-CM

## 2021-03-10 PROCEDURE — 97110 THERAPEUTIC EXERCISES: CPT

## 2021-03-10 PROCEDURE — 97140 MANUAL THERAPY 1/> REGIONS: CPT

## 2021-03-10 NOTE — PROGRESS NOTES
Dx: Right shoulder tendinitis (M77.8)         Insurance (Authorized # of Visits):  8 HMO exp 5/9/21  POC visits: 15   Authorizing Physician: Dr. Mell Santo MD visit: 3/21/21  Fall Risk: standard         Precautions:      Subjective: Pt states R memo 35 min  Total Treatment Time: 35 min

## 2021-03-13 DIAGNOSIS — M25.511 RIGHT SHOULDER PAIN, UNSPECIFIED CHRONICITY: ICD-10-CM

## 2021-03-15 RX ORDER — MELOXICAM 15 MG/1
15 TABLET ORAL DAILY PRN
Qty: 30 TABLET | Refills: 0 | Status: SHIPPED | OUTPATIENT
Start: 2021-03-15 | End: 2021-09-17

## 2021-03-15 NOTE — TELEPHONE ENCOUNTER
Medication request: Meloxicam 15mg 1 tab PO qdaily x7 days, then 1 tab PO daily PRN pain. Take with food.     LOV: 02/18/21   NOV: 03/23/21    [place] 02/18/21 #30 r-0

## 2021-03-17 ENCOUNTER — OFFICE VISIT (OUTPATIENT)
Dept: PHYSICAL THERAPY | Age: 66
End: 2021-03-17
Attending: INTERNAL MEDICINE
Payer: COMMERCIAL

## 2021-03-17 DIAGNOSIS — M75.01 ADHESIVE CAPSULITIS OF RIGHT SHOULDER: ICD-10-CM

## 2021-03-17 PROCEDURE — 97110 THERAPEUTIC EXERCISES: CPT

## 2021-03-17 PROCEDURE — 97140 MANUAL THERAPY 1/> REGIONS: CPT

## 2021-03-17 NOTE — PROGRESS NOTES
Dx: Right shoulder tendinitis (M77.8)         Insurance (Authorized # of Visits):  8 HMO exp 5/9/21  POC visits: 15   Authorizing Physician: Dr. Parris Duke  Next MD visit: 3/23/21  Fall Risk: standard         Precautions:      Subjective: Pt states she co ER yellow TB 15x R         NMRE:   sidelying R periscapular isometrics w/PT Manual therapy:  Supine R pec, bicep MFR, trigger point release  Manual therapy:  Supine R pec, bicep MFR and trigger point release                    HEP: 3/4/21- supine ER stretc

## 2021-03-22 ENCOUNTER — OFFICE VISIT (OUTPATIENT)
Dept: PHYSICAL THERAPY | Age: 66
End: 2021-03-22
Attending: INTERNAL MEDICINE
Payer: COMMERCIAL

## 2021-03-22 DIAGNOSIS — M75.01 ADHESIVE CAPSULITIS OF RIGHT SHOULDER: ICD-10-CM

## 2021-03-22 PROCEDURE — 97110 THERAPEUTIC EXERCISES: CPT

## 2021-03-22 PROCEDURE — 97140 MANUAL THERAPY 1/> REGIONS: CPT

## 2021-03-22 RX ORDER — PANTOPRAZOLE SODIUM 40 MG/1
TABLET, DELAYED RELEASE ORAL
Qty: 90 TABLET | Refills: 1 | Status: SHIPPED | OUTPATIENT
Start: 2021-03-22 | End: 2021-11-24

## 2021-03-22 NOTE — PROGRESS NOTES
Dx: Right shoulder tendinitis (M77.8)         Insurance (Authorized # of Visits):  8 HMO exp 5/9/21  POC visits: 15   Authorizing Physician: Dr. Reymundo Corcoran Next MD visit: 3/23/21  Fall Risk: standard         Precautions:      Subjective: Pt states R shoulder p TB 15x2  Unilateral ER yellow TB 15x R     There ex:  sidelying ER 15x R  Sh ex red TB 15x  Rows red TB 15x  OH pulleys scaption 10x2          NMRE:   sidelying R periscapular isometrics w/PT Manual therapy:  Supine R pec, bicep MFR, trigger point release

## 2021-03-23 ENCOUNTER — TELEPHONE (OUTPATIENT)
Dept: NEUROLOGY | Facility: CLINIC | Age: 66
End: 2021-03-23

## 2021-03-24 ENCOUNTER — OFFICE VISIT (OUTPATIENT)
Dept: PHYSICAL THERAPY | Age: 66
End: 2021-03-24
Attending: INTERNAL MEDICINE
Payer: COMMERCIAL

## 2021-03-24 DIAGNOSIS — M75.01 ADHESIVE CAPSULITIS OF RIGHT SHOULDER: ICD-10-CM

## 2021-03-24 PROCEDURE — 97110 THERAPEUTIC EXERCISES: CPT

## 2021-03-24 NOTE — PROGRESS NOTES
Dx: Right shoulder tendinitis (M77.8)         Insurance (Authorized # of Visits):  8 HMO exp 5/9/21  POC visits: 15   Authorizing Physician: Dr. Vanessa Santo MD visit: 3/23/21  Fall Risk: standard         Precautions:      Subjective: Pt states R shoulder p flex/abd,IR,ER   sidelying ER 1# 10x2 R  sidelying flex 10x2 R  sidelying horiz abd/add 10x2 R  Prone sh ext 10x2 R  Prone row 10x2 R  OH pulleys flex/scaption 10x2 ea  Rows red TB 10x2  Sh ext red TB 10x2     NMRE:   sidelying R periscapular isometrics w/

## 2021-03-25 NOTE — TELEPHONE ENCOUNTER
Paperwork filled out and left message on Patient voicemail that paperwork is complete and want to know what she would like us to do with the paperwork now. Envelope in Dr Galvin Risen fold er the nurses station.

## 2021-03-26 ENCOUNTER — MED REC SCAN ONLY (OUTPATIENT)
Dept: NEUROLOGY | Facility: CLINIC | Age: 66
End: 2021-03-26

## 2021-03-26 NOTE — TELEPHONE ENCOUNTER
Spoke to patient. Patient will  family medical leave paperwork the week of 3/29/21. Envelope in front office folder. Copy of Family medical leave papers copied and sent to scanning.

## 2021-03-31 ENCOUNTER — OFFICE VISIT (OUTPATIENT)
Dept: DERMATOLOGY CLINIC | Facility: CLINIC | Age: 66
End: 2021-03-31

## 2021-03-31 ENCOUNTER — APPOINTMENT (OUTPATIENT)
Dept: PHYSICAL THERAPY | Age: 66
End: 2021-03-31
Attending: INTERNAL MEDICINE
Payer: COMMERCIAL

## 2021-03-31 DIAGNOSIS — L30.9 DERMATITIS: Primary | ICD-10-CM

## 2021-03-31 PROCEDURE — 99203 OFFICE O/P NEW LOW 30 MIN: CPT | Performed by: DERMATOLOGY

## 2021-03-31 RX ORDER — TRIAMCINOLONE ACETONIDE 5 MG/G
CREAM TOPICAL
Qty: 60 G | Refills: 1 | Status: SHIPPED | OUTPATIENT
Start: 2021-03-31

## 2021-03-31 RX ORDER — LEVOCETIRIZINE DIHYDROCHLORIDE 5 MG/1
5 TABLET, FILM COATED ORAL EVERY EVENING
Qty: 30 TABLET | Refills: 3 | Status: SHIPPED | OUTPATIENT
Start: 2021-03-31 | End: 2021-05-18 | Stop reason: ALTCHOICE

## 2021-03-31 RX ORDER — HYDROQUINONE 40 MG/G
1 CREAM TOPICAL 2 TIMES DAILY
Qty: 30 G | Refills: 1 | Status: SHIPPED | OUTPATIENT
Start: 2021-03-31 | End: 2021-04-30

## 2021-04-05 ENCOUNTER — OFFICE VISIT (OUTPATIENT)
Dept: PHYSICAL THERAPY | Age: 66
End: 2021-04-05
Attending: INTERNAL MEDICINE
Payer: COMMERCIAL

## 2021-04-05 DIAGNOSIS — M75.01 ADHESIVE CAPSULITIS OF RIGHT SHOULDER: ICD-10-CM

## 2021-04-05 PROCEDURE — 97110 THERAPEUTIC EXERCISES: CPT

## 2021-04-05 NOTE — PROGRESS NOTES
Dx: Right shoulder tendinitis (M77.8)         Insurance (Authorized # of Visits):  8 HMO exp 5/9/21  POC visits: 15   Authorizing Physician: Dr. Michael Ortiz Next MD visit: 3/23/21  Fall Risk: standard         Precautions:      Subjective: Pt states R shoulder p R     There ex:  sidelying ER 15x R  Sh ex red TB 15x  Rows red TB 15x  OH pulleys scaption 10x2       There ex:  scifit 2 min fwd/ 2 min bwd  Supine PROM R shoulder flex/abd,IR,ER   sidelying ER 1# 10x2 R  sidelying flex 10x2 R  sidelying horiz abd/add 10

## 2021-04-06 ENCOUNTER — OFFICE VISIT (OUTPATIENT)
Dept: NEUROLOGY | Facility: CLINIC | Age: 66
End: 2021-04-06

## 2021-04-06 VITALS — BODY MASS INDEX: 26.87 KG/M2 | WEIGHT: 146 LBS | HEIGHT: 62 IN

## 2021-04-06 DIAGNOSIS — M75.41 ROTATOR CUFF IMPINGEMENT SYNDROME OF RIGHT SHOULDER: Primary | ICD-10-CM

## 2021-04-06 PROCEDURE — 99214 OFFICE O/P EST MOD 30 MIN: CPT | Performed by: PHYSICAL MEDICINE & REHABILITATION

## 2021-04-06 PROCEDURE — 3008F BODY MASS INDEX DOCD: CPT | Performed by: PHYSICAL MEDICINE & REHABILITATION

## 2021-04-06 NOTE — PATIENT INSTRUCTIONS
If Doctor has ordered an injection or MRI and if you do not hear from us within 3 business days please call the office or send a message through [employer] and ask if the injection/MRI has been approved

## 2021-04-07 ENCOUNTER — APPOINTMENT (OUTPATIENT)
Dept: PHYSICAL THERAPY | Age: 66
End: 2021-04-07
Attending: INTERNAL MEDICINE
Payer: COMMERCIAL

## 2021-04-07 ENCOUNTER — HOSPITAL ENCOUNTER (OUTPATIENT)
Dept: MRI IMAGING | Age: 66
Discharge: HOME | End: 2021-04-07
Attending: PHYSICAL MEDICINE & REHABILITATION
Payer: COMMERCIAL

## 2021-04-07 ENCOUNTER — TELEPHONE (OUTPATIENT)
Dept: NEUROLOGY | Facility: CLINIC | Age: 66
End: 2021-04-07

## 2021-04-07 ENCOUNTER — TELEPHONE (OUTPATIENT)
Dept: PHYSICAL THERAPY | Facility: HOSPITAL | Age: 66
End: 2021-04-07

## 2021-04-07 DIAGNOSIS — M25.511 RIGHT SHOULDER PAIN, UNSPECIFIED CHRONICITY: ICD-10-CM

## 2021-04-07 DIAGNOSIS — M75.41 ROTATOR CUFF IMPINGEMENT SYNDROME OF RIGHT SHOULDER: ICD-10-CM

## 2021-04-07 PROCEDURE — 73221 MRI JOINT UPR EXTREM W/O DYE: CPT | Performed by: PHYSICAL MEDICINE & REHABILITATION

## 2021-04-07 RX ORDER — MELOXICAM 15 MG/1
TABLET ORAL
Qty: 30 TABLET | Refills: 0 | OUTPATIENT
Start: 2021-04-07

## 2021-04-07 NOTE — TELEPHONE ENCOUNTER
Declined refill of Meloxicam. Patient state she is not needing a refill at this time.  Will call when needed

## 2021-04-07 NOTE — PROGRESS NOTES
Progress note    C/C: right shoulder pain    HPI: 42-year-old female presents for follow-up. Continues to have right anterior lateral shoulder pain that worsens when attempting to perform overhead motions and upper body dressing.   No associated numbness o

## 2021-04-07 NOTE — TELEPHONE ENCOUNTER
----- Message from Bala Ibrahim DO sent at 4/7/2021  3:55 PM CDT -----  There is a full thickness rotator cuff tear; this is likely causing her symptoms. Recommend she see orthopedics for consultation.

## 2021-04-12 ENCOUNTER — TELEPHONE (OUTPATIENT)
Dept: NEUROLOGY | Facility: CLINIC | Age: 66
End: 2021-04-12

## 2021-04-12 ENCOUNTER — TELEPHONE (OUTPATIENT)
Dept: INTERNAL MEDICINE CLINIC | Facility: CLINIC | Age: 66
End: 2021-04-12

## 2021-04-12 DIAGNOSIS — M77.8 TENDONITIS OF SHOULDER, RIGHT: Primary | ICD-10-CM

## 2021-04-12 NOTE — PROGRESS NOTES
Vika Stanley is a 72year old female. Patient presents with:  Derm Problem: LOV (2016) Patient present with dark itchy skin on bilateral arms and face. Patient c/o having dryness for 6 months .  Patient currently using aveeno and baby oil with no imp SUPN INJECT 55 UNITS SUBCUTANEOUS IN THE MORNING AND 50 UNITS AT NIGHT.  35 pen 2   • CYCLOBENZAPRINE 10 MG Oral Tab TAKE 1 TABLET BY MOUTH NIGHTLY AS NEEDED FOR MUSCLE SPASMS 20 tablet 1   • HYDROCHLOROTHIAZIDE 25 MG Oral Tab TAKE 1 TABLET BY MOUTH EVERY D Cream Apply 1 Application topically 2 (two) times daily for 60 doses.  Use bid as directed 30 g 1   • triamcinolone acetonide 0.5 % External Cream Use  At bedtime to face as directed 60 g 1   • Levocetirizine Dihydrochloride 5 MG Oral Tab Take 1 tablet (5 m Blood (ACCU-CHEK ESHA PLUS) In Vitro Strip Test glucose twice daily 200 strip 0   • ONETOUCH DELICA LANCETS 47N Does not apply Misc 1 each by Does not apply route 2 (two) times daily.  200 each 3   • ESCITALOPRAM 5 MG Oral Tab TAKE 1 TABLET (5 MG TOTAL) BY Vaping Use      Vaping Use: Never used    Substance and Sexual Activity      Alcohol use: No        Alcohol/week: 0.0 standard drinks      Drug use: No      Sexual activity: Not on file    Other Topics      Concerns:         Service: Not Asked Sexually Abused:   Family History   Problem Relation Age of Onset   • Diabetes Father    • Heart Disease Father         Coronary artery disease   • Diabetes Mother    • Heart Attack Mother 80        MI   • Diabetes Sister    • Lipids Other         Family H bilateral arms, bilateral legs, palms. Remarkable for multiple erythematous scaling eczematous patches over arms diffusely, splotchy areas on the face.   Exam otherwise significant for    Prominent hyperpigmentation over these areas    ASSESSMENT AND Signed Prescriptions Disp Refills   • Hydroquinone 4 % External Cream 30 g 1     Sig: Apply 1 Application topically 2 (two) times daily for 60 doses.  Use bid as directed   • triamcinolone acetonide 0.5 % External Cream 60 g 1     Sig: Use  At bedtime t

## 2021-04-12 NOTE — TELEPHONE ENCOUNTER
Patient calling to verify name and number of surgeon Dr. Hidalgo Reasons would like her to see. See previous telephone message for details. Provided patient with information of orthopedic surgeon. Patient verbalized understanding.

## 2021-04-12 NOTE — TELEPHONE ENCOUNTER
Pt requesting referral to see Dr. Sara Garcia, states Dr. Amina Moran advised her to see Dr. Sheila Campos for right shoulder pain.     Referral pended for review with dx code as noted in physiatry referral.

## 2021-04-19 ENCOUNTER — TELEPHONE (OUTPATIENT)
Dept: INTERNAL MEDICINE CLINIC | Facility: CLINIC | Age: 66
End: 2021-04-19

## 2021-04-19 DIAGNOSIS — Z79.4 DIABETES MELLITUS TYPE 2, INSULIN DEPENDENT (HCC): Primary | ICD-10-CM

## 2021-04-19 DIAGNOSIS — E11.9 DIABETES MELLITUS TYPE 2, INSULIN DEPENDENT (HCC): Primary | ICD-10-CM

## 2021-04-19 NOTE — TELEPHONE ENCOUNTER
Patient is requesting a referral for eye doctor, Dr. Liliana Boone. Patient states she needs to see him for a follow up to have her eyes checked since she is diabetic.     Ph# [phone]

## 2021-04-22 ENCOUNTER — OFFICE VISIT (OUTPATIENT)
Dept: ORTHOPEDICS CLINIC | Facility: CLINIC | Age: 66
End: 2021-04-22
Payer: MEDICARE

## 2021-04-22 ENCOUNTER — OFFICE VISIT (OUTPATIENT)
Dept: OPTOMETRY | Facility: CLINIC | Age: 66
End: 2021-04-22
Payer: MEDICARE

## 2021-04-22 VITALS
HEIGHT: 62 IN | HEART RATE: 69 BPM | DIASTOLIC BLOOD PRESSURE: 91 MMHG | SYSTOLIC BLOOD PRESSURE: 172 MMHG | WEIGHT: 146 LBS | BODY MASS INDEX: 26.87 KG/M2

## 2021-04-22 DIAGNOSIS — H25.13 AGE-RELATED NUCLEAR CATARACT OF BOTH EYES: ICD-10-CM

## 2021-04-22 DIAGNOSIS — Z79.4 TYPE 2 DIABETES MELLITUS WITHOUT COMPLICATION, WITH LONG-TERM CURRENT USE OF INSULIN (HCC): Primary | ICD-10-CM

## 2021-04-22 DIAGNOSIS — H52.4 HYPEROPIA WITH PRESBYOPIA, BILATERAL: ICD-10-CM

## 2021-04-22 DIAGNOSIS — H52.03 HYPEROPIA WITH PRESBYOPIA, BILATERAL: ICD-10-CM

## 2021-04-22 DIAGNOSIS — M75.121 NONTRAUMATIC COMPLETE TEAR OF RIGHT ROTATOR CUFF: Primary | ICD-10-CM

## 2021-04-22 DIAGNOSIS — E11.9 TYPE 2 DIABETES MELLITUS WITHOUT COMPLICATION, WITH LONG-TERM CURRENT USE OF INSULIN (HCC): Primary | ICD-10-CM

## 2021-04-22 PROCEDURE — 92015 DETERMINE REFRACTIVE STATE: CPT | Performed by: OPTOMETRIST

## 2021-04-22 PROCEDURE — 3080F DIAST BP >= 90 MM HG: CPT | Performed by: ORTHOPAEDIC SURGERY

## 2021-04-22 PROCEDURE — 20610 DRAIN/INJ JOINT/BURSA W/O US: CPT | Performed by: ORTHOPAEDIC SURGERY

## 2021-04-22 PROCEDURE — 3008F BODY MASS INDEX DOCD: CPT | Performed by: ORTHOPAEDIC SURGERY

## 2021-04-22 PROCEDURE — 99244 OFF/OP CNSLTJ NEW/EST MOD 40: CPT | Performed by: ORTHOPAEDIC SURGERY

## 2021-04-22 PROCEDURE — 92014 COMPRE OPH EXAM EST PT 1/>: CPT | Performed by: OPTOMETRIST

## 2021-04-22 PROCEDURE — 3077F SYST BP >= 140 MM HG: CPT | Performed by: ORTHOPAEDIC SURGERY

## 2021-04-22 RX ORDER — TRIAMCINOLONE ACETONIDE 40 MG/ML
40 INJECTION, SUSPENSION INTRA-ARTICULAR; INTRAMUSCULAR ONCE
Status: COMPLETED | OUTPATIENT
Start: 2021-04-22 | End: 2021-04-22

## 2021-04-22 RX ADMIN — TRIAMCINOLONE ACETONIDE 40 MG: 40 INJECTION, SUSPENSION INTRA-ARTICULAR; INTRAMUSCULAR at 11:52:00

## 2021-04-22 NOTE — PATIENT INSTRUCTIONS
Type 2 diabetes mellitus without complication, with long-term current use of insulin ([address]  I advised patient that there is no background diabetic retinopathy in either eye and that they should continue to keep their blood sugar under control and continue

## 2021-04-22 NOTE — PROGRESS NOTES
Nena Rayo is a 72year old female.     HPI:     HPI     Diabetic Eye Exam     Diabetes Type: Type 2, controlled with diet, on insulin and taking oral medications    Duration: 15 years    Number of years diabetic: 15    Number of years on pills: 15    N MI   • Diabetes Sister    • Lipids Other         Family H/o: Hyperlipidemia   • Glaucoma Neg        Social History: Social History    Tobacco Use      Smoking status: Never Smoker      Smokeless tobacco: Never Used    Vaping Use      Vaping Use: Never used 33G Does not apply Misc 1 each by Does not apply route 2 (two) times daily. 200 each 3   • CETIRIZINE 10 MG Oral Tab TAKE 1 TABLET (10 MG TOTAL) BY MOUTH DAILY.  90 tablet 0   • Meclizine HCl 12.5 MG Oral Tab 1 tablet by mouth twice a day when necessary 30 Both eyes: 1.0% Mydriacyl and 2.5% Amadou Synephrine @ 9:07 AM            Additional Tests     Amsler       Right Left     Normal Normal            Slit Lamp and Fundus Exam     External Exam       Right Left    External Normal Normal          Slit Lamp Exam in this encounter.       Meds This Visit:  Requested Prescriptions      No prescriptions requested or ordered in this encounter        Follow up instructions:  Return in about 1 year (around 4/22/2022) for Diabetic Eye exam.    4/22/2021  Scribed by: Sandip Mcallister

## 2021-04-22 NOTE — PROGRESS NOTES
1059 repeat blood pressure 172/91 pulse 69  BP has reduced to baseline prior to injection  Patient currently asymptomatic. Discussed with Dr. Bekah Henry. Patient may go home but RN to instruct patient to call PCP today regarding elevated BP readings.  BP

## 2021-04-22 NOTE — PROGRESS NOTES
1048 rechecked blood pressure after in office injection. BP elevated to 201/93 HR 70  Currently asymptmatic. Discussed with Dr. Quinton Beaver  Per Dr. Quinton Beaver Pt is to rest for 10 minutes. RN to recheck BP in 10 minutes.  If still elevated plan to send Pt to

## 2021-04-22 NOTE — ASSESSMENT & PLAN NOTE
No treatment is required. Will continue to observe.  Vision is still acceptable to patient at this tiem

## 2021-04-22 NOTE — PROGRESS NOTES
NURSING INTAKE COMMENTS: Patient presents with:  Consult: right shoulder pain -- Pain interferring with sleep. Rates pain 10/10 at night and 5/10 right now. Tried PT last 2 mths.        HPI: This 72year old female presents today with complaints of right sh Tab EC TAKE 1 TABLET BY MOUTH EVERY DAY IN THE MORNING BEFORE BREAKFAST 90 tablet 1   • Meloxicam 15 MG Oral Tab Take 1 tablet (15 mg total) by mouth daily as needed for Pain.  30 tablet 0   • LEVOTHYROXINE SODIUM 50 MCG Oral Tab TAKE 1 TABLET BY MOUTH EVER CYCLOBENZAPRINE 10 MG Oral Tab TAKE 1 TABLET BY MOUTH NIGHTLY AS NEEDED FOR MUSCLE SPASMS (Patient not taking: Reported on 4/22/2021) 20 tablet 1   • HYDROCHLOROTHIAZIDE 25 MG Oral Tab TAKE 1 TABLET BY MOUTH EVERY DAY (Patient not taking: Reported on 4/22/ urinating  MUSCULOSKELETAL: denies musculoskeletal complaints other than in HPI  NEURO: denies numbness, tingling, weakness, balance issues, dizziness, memory loss  PSYCHIATRIC: denies Hx of depression, anxiety, other psychiatric disorders  HEMATOLOGIC: de performed. Images were performed without contrast.   FINDINGS:  ROTATOR CUFF:  SUPRASPINATUS:  There is increased signal within the tendon with a high-grade partial-thickness articular surface tear with full-thickness component along the anterior fibers. joint bodies seen within the subcoracoid recess. Degenerative changes involving the anterior and superior labrum with a degenerative SLAP tear.     Dictated by (CST): Radha Mckinney MD on 4/07/2021 at 2:23 PM     Finalized by (CST): Radha Mckinney MD on 4/07/2

## 2021-04-23 ENCOUNTER — TELEPHONE (OUTPATIENT)
Dept: ORTHOPEDICS CLINIC | Facility: CLINIC | Age: 66
End: 2021-04-23

## 2021-04-23 NOTE — TELEPHONE ENCOUNTER
Per pt needs note stating how many hours she is allowed to work in one week, note can be put on mychart. Please advise thank you.

## 2021-04-24 NOTE — TELEPHONE ENCOUNTER
Patient calling regarding notes, indicates she can work 69 hours bi-weekly, please sent note to patients andrés savage.

## 2021-04-29 ENCOUNTER — TELEPHONE (OUTPATIENT)
Dept: INTERNAL MEDICINE CLINIC | Facility: CLINIC | Age: 66
End: 2021-04-29

## 2021-04-29 DIAGNOSIS — H93.11: Primary | ICD-10-CM

## 2021-05-10 RX ORDER — ATORVASTATIN CALCIUM 10 MG/1
TABLET, FILM COATED ORAL
Qty: 90 TABLET | Refills: 1 | Status: SHIPPED | OUTPATIENT
Start: 2021-05-10 | End: 2021-11-01

## 2021-05-10 RX ORDER — AMLODIPINE BESYLATE 10 MG/1
TABLET ORAL
Qty: 90 TABLET | Refills: 1 | Status: SHIPPED | OUTPATIENT
Start: 2021-05-10 | End: 2021-10-28

## 2021-05-18 ENCOUNTER — OFFICE VISIT (OUTPATIENT)
Dept: OTOLARYNGOLOGY | Facility: CLINIC | Age: 66
End: 2021-05-18

## 2021-05-18 ENCOUNTER — OFFICE VISIT (OUTPATIENT)
Dept: AUDIOLOGY | Facility: CLINIC | Age: 66
End: 2021-05-18
Payer: MEDICARE

## 2021-05-18 VITALS
SYSTOLIC BLOOD PRESSURE: 138 MMHG | HEIGHT: 62 IN | WEIGHT: 144 LBS | BODY MASS INDEX: 26.5 KG/M2 | DIASTOLIC BLOOD PRESSURE: 66 MMHG

## 2021-05-18 DIAGNOSIS — H93.11 TINNITUS, RIGHT: Primary | ICD-10-CM

## 2021-05-18 DIAGNOSIS — H93.13 RINGING IN THE EARS, BILATERAL: Primary | ICD-10-CM

## 2021-05-18 PROCEDURE — 3008F BODY MASS INDEX DOCD: CPT | Performed by: OTOLARYNGOLOGY

## 2021-05-18 PROCEDURE — 3078F DIAST BP <80 MM HG: CPT | Performed by: OTOLARYNGOLOGY

## 2021-05-18 PROCEDURE — 3075F SYST BP GE 130 - 139MM HG: CPT | Performed by: OTOLARYNGOLOGY

## 2021-05-18 PROCEDURE — 92567 TYMPANOMETRY: CPT | Performed by: AUDIOLOGIST

## 2021-05-18 PROCEDURE — 92557 COMPREHENSIVE HEARING TEST: CPT | Performed by: AUDIOLOGIST

## 2021-05-18 PROCEDURE — 99243 OFF/OP CNSLTJ NEW/EST LOW 30: CPT | Performed by: OTOLARYNGOLOGY

## 2021-05-18 NOTE — PROGRESS NOTES
Cookie Edmond is a 72year old female. Patient presents with:  Consult: ringing in the right ear x 3 years ,denies pain     HPI:   For many years she has had issues with noise in her right ear.   She reports that there is been several different noises over taking: Reported on 4/22/2021 ) 60 g 1   • Meloxicam 15 MG Oral Tab Take 1 tablet (15 mg total) by mouth daily as needed for Pain.  (Patient not taking: Reported on 5/18/2021 ) 30 tablet 0   • Insulin Pen Needle (BD PEN NEEDLE CARY U/F) 32G X 4 MM Does not Nasal septum - Normal, Turbinates - Normal   Neurological Normal Memory - Normal. Cranial nerves - Cranial nerves II through XII grossly intact.    Neck Exam Normal Inspection - Normal. Palpation - Normal. Parotid gland - Normal. Thyroid gland - Normal.   P

## 2021-05-19 NOTE — PROGRESS NOTES
AUDIOGRAM     Inocente Clark was referred for testing by Dr. Narcisa Peterson for right sided tinnitus.    11/27/1955  [id number]      Results of poor reliability as patient need to be re instructed multiple times to respond to soft sounds        Otoscopic inspe

## 2021-05-27 NOTE — TELEPHONE ENCOUNTER
Pharmacy calling for f/u, med meets protocol, provided verbal order.       Refill Protocol Appointment Criteria  · Appointment scheduled in the past 6 months or in the next 3 months  Recent Outpatient Visits            3 weeks ago Essential hypertension
Detail Level: Zone
Plan: Return to office if symptoms do not resolve.
Initiate Treatment: Apply Ketoconazole cream as directed. May also wash body with otc Selenium shampoo twice weekly.
Initiate Treatment: Apply hydrocortisone Rx as directed, May also apply to right ala as needed.

## 2021-05-28 ENCOUNTER — OFFICE VISIT (OUTPATIENT)
Dept: ORTHOPEDICS CLINIC | Facility: CLINIC | Age: 66
End: 2021-05-28

## 2021-05-28 DIAGNOSIS — M75.121 NONTRAUMATIC COMPLETE TEAR OF RIGHT ROTATOR CUFF: Primary | ICD-10-CM

## 2021-05-28 PROCEDURE — 99213 OFFICE O/P EST LOW 20 MIN: CPT | Performed by: ORTHOPAEDIC SURGERY

## 2021-05-28 NOTE — PROGRESS NOTES
NURSING INTAKE COMMENTS: Patient presents with:  Shoulder Pain: right shoulder, s/p cortisone injection, minial improvement      HPI: This 72year old female presents today with complaints of right shoulder pain follow-up.   She had a cortisone injection 1 acetonide 0.5 % External Cream Use  At bedtime to face as directed (Patient not taking: Reported on 4/22/2021 ) 60 g 1   • PANTOPRAZOLE SODIUM 40 MG Oral Tab EC TAKE 1 TABLET BY MOUTH EVERY DAY IN THE MORNING BEFORE BREAKFAST 90 tablet 1   • Meloxicam 15 M tobacco: Never Used    Vaping Use      Vaping Use: Never used    Substance and Sexual Activity      Alcohol use: No        Alcohol/week: 0.0 standard drinks      Drug use: No      Sexual activity: Not on file       Review of Systems:  GENERAL: denies fever shoulder abduction strength 5 out of 5. Rito sign negative. No obvious atrophy of the hand. Imaging:   No results found.      Labs:  Lab Results   Component Value Date    WBC 4.6 02/19/2021    HGB 13.1 02/19/2021    .0 02/19/2021      Lab

## 2021-06-07 ENCOUNTER — TELEPHONE (OUTPATIENT)
Dept: INTERNAL MEDICINE CLINIC | Facility: CLINIC | Age: 66
End: 2021-06-07

## 2021-06-07 DIAGNOSIS — Z79.4 DIABETES MELLITUS TYPE 2, INSULIN DEPENDENT (HCC): Primary | ICD-10-CM

## 2021-06-07 DIAGNOSIS — E11.9 DIABETES MELLITUS TYPE 2, INSULIN DEPENDENT (HCC): Primary | ICD-10-CM

## 2021-06-07 RX ORDER — LOSARTAN POTASSIUM 100 MG/1
TABLET ORAL
Qty: 90 TABLET | Refills: 1 | Status: SHIPPED | OUTPATIENT
Start: 2021-06-07 | End: 2021-11-07

## 2021-06-09 ENCOUNTER — OFFICE VISIT (OUTPATIENT)
Dept: INTERNAL MEDICINE CLINIC | Facility: CLINIC | Age: 66
End: 2021-06-09

## 2021-06-09 VITALS
HEIGHT: 62 IN | HEART RATE: 64 BPM | WEIGHT: 146 LBS | BODY MASS INDEX: 26.87 KG/M2 | RESPIRATION RATE: 16 BRPM | DIASTOLIC BLOOD PRESSURE: 78 MMHG | SYSTOLIC BLOOD PRESSURE: 132 MMHG

## 2021-06-09 DIAGNOSIS — N18.30 CKD STAGE 3 DUE TO TYPE 2 DIABETES MELLITUS (HCC): Primary | ICD-10-CM

## 2021-06-09 DIAGNOSIS — I10 ESSENTIAL HYPERTENSION: ICD-10-CM

## 2021-06-09 DIAGNOSIS — Z79.4 DIABETES MELLITUS TYPE 2, INSULIN DEPENDENT (HCC): ICD-10-CM

## 2021-06-09 DIAGNOSIS — E11.9 DIABETES MELLITUS TYPE 2, INSULIN DEPENDENT (HCC): ICD-10-CM

## 2021-06-09 DIAGNOSIS — E11.22 CKD STAGE 3 DUE TO TYPE 2 DIABETES MELLITUS (HCC): Primary | ICD-10-CM

## 2021-06-09 PROCEDURE — 99214 OFFICE O/P EST MOD 30 MIN: CPT | Performed by: INTERNAL MEDICINE

## 2021-06-09 PROCEDURE — 3078F DIAST BP <80 MM HG: CPT | Performed by: INTERNAL MEDICINE

## 2021-06-09 PROCEDURE — 3075F SYST BP GE 130 - 139MM HG: CPT | Performed by: INTERNAL MEDICINE

## 2021-06-09 PROCEDURE — 3008F BODY MASS INDEX DOCD: CPT | Performed by: INTERNAL MEDICINE

## 2021-06-09 RX ORDER — FENOFIBRATE 134 MG/1
134 CAPSULE ORAL
Qty: 90 CAPSULE | Refills: 2 | Status: SHIPPED | OUTPATIENT
Start: 2021-06-09

## 2021-06-09 NOTE — PATIENT INSTRUCTIONS
Problem List Items Addressed This Visit        Unprioritized    CKD stage 3 due to type 2 diabetes mellitus ([facility] [address] - Primary     Stable CKD stage III. Continue to monitor.   Keep well-hydrated and follow-up on labs         Diabetes mellitus type 2, insulin

## 2021-06-09 NOTE — ASSESSMENT & PLAN NOTE
Blood pressure 132/78, pulse 64, resp. rate 16, height 5' 2\" (1.575 m), weight 146 lb (66.2 kg), not currently breastfeeding. Blood pressure looks stable, continue on losartan 100 mg daily, hydrochlorothiazide 25 mg daily, amlodipine 10 mg daily.   No

## 2021-06-09 NOTE — ASSESSMENT & PLAN NOTE
Blood sugars were elevated on her last labs completed in February 19. Hemoglobin A1c was 8.8.   At the time her doses of NovoLog Mix were increased from 24 to 25 units with breakfast and dinner to 30 units in the evening with dinner and 34 units in the mor

## 2021-06-09 NOTE — PROGRESS NOTES
HPI:    Patient ID: Arya Wood is a 72year old female. Urine test shows slightly elevated protein levels in the urine. This most likely is due to inadequate fluid intake or elevated blood pressures.  We will discuss this at the next visit.   The th taken between 5-6 pm. Her dinner blood glucose range is generally  mg/dl. An ACE inhibitor/angiotensin II receptor blocker is being taken. She sees a podiatrist.Eye exam is current. Hypertension  This is a chronic problem.  The current episode start TABLET BY MOUTH EVERY DAY IN THE MORNING BEFORE BREAKFAST 90 tablet 1   • Meloxicam 15 MG Oral Tab Take 1 tablet (15 mg total) by mouth daily as needed for Pain.  30 tablet 0   • LEVOTHYROXINE SODIUM 50 MCG Oral Tab TAKE 1 TABLET BY MOUTH EVERY MORNING BEFO Pulmonary effort is normal. No respiratory distress. Breath sounds: Normal breath sounds. No wheezing or rales. Chest:      Chest wall: No tenderness. Abdominal:      General: Bowel sounds are normal. There is no distension. Palpations:  There units in the morning with breakfast  Additionally she is on Metformin 500 mg 2 tablets with lunch. She has tolerated this without low sugar readings. She has had a history of low sugars and was admitted to the emergency with a hypoglycemic episode.     Hanna

## 2021-06-10 ENCOUNTER — HOSPITAL ENCOUNTER (OUTPATIENT)
Dept: ULTRASOUND IMAGING | Facility: HOSPITAL | Age: 66
Discharge: HOME | End: 2021-06-10
Attending: OTOLARYNGOLOGY
Payer: COMMERCIAL

## 2021-06-10 DIAGNOSIS — H93.13 RINGING IN THE EARS, BILATERAL: ICD-10-CM

## 2021-06-10 PROCEDURE — 93880 EXTRACRANIAL BILAT STUDY: CPT | Performed by: OTOLARYNGOLOGY

## 2021-06-18 ENCOUNTER — HOSPITAL ENCOUNTER (OUTPATIENT)
Dept: ULTRASOUND IMAGING | Age: 66
Discharge: HOME | End: 2021-06-18
Attending: OTOLARYNGOLOGY
Payer: COMMERCIAL

## 2021-06-18 DIAGNOSIS — E04.1 THYROID NODULE: ICD-10-CM

## 2021-06-18 PROCEDURE — 76536 US EXAM OF HEAD AND NECK: CPT | Performed by: OTOLARYNGOLOGY

## 2021-06-24 ENCOUNTER — LAB ENCOUNTER (OUTPATIENT)
Dept: LAB | Age: 66
End: 2021-06-24
Attending: INTERNAL MEDICINE
Payer: COMMERCIAL

## 2021-06-24 DIAGNOSIS — Z79.4 DIABETES MELLITUS TYPE 2, INSULIN DEPENDENT (HCC): ICD-10-CM

## 2021-06-24 DIAGNOSIS — E11.9 DIABETES MELLITUS TYPE 2, INSULIN DEPENDENT (HCC): ICD-10-CM

## 2021-06-24 PROCEDURE — 80061 LIPID PANEL: CPT

## 2021-06-24 PROCEDURE — 81001 URINALYSIS AUTO W/SCOPE: CPT

## 2021-06-24 PROCEDURE — 83036 HEMOGLOBIN GLYCOSYLATED A1C: CPT

## 2021-06-24 PROCEDURE — 85025 COMPLETE CBC W/AUTO DIFF WBC: CPT

## 2021-06-24 PROCEDURE — 84443 ASSAY THYROID STIM HORMONE: CPT

## 2021-06-24 PROCEDURE — 82570 ASSAY OF URINE CREATININE: CPT

## 2021-06-24 PROCEDURE — 80053 COMPREHEN METABOLIC PANEL: CPT

## 2021-06-24 PROCEDURE — 82043 UR ALBUMIN QUANTITATIVE: CPT

## 2021-06-24 PROCEDURE — 36415 COLL VENOUS BLD VENIPUNCTURE: CPT

## 2021-07-29 ENCOUNTER — OFFICE VISIT (OUTPATIENT)
Dept: ORTHOPEDICS CLINIC | Facility: CLINIC | Age: 66
End: 2021-07-29

## 2021-07-29 VITALS — HEIGHT: 62 IN | WEIGHT: 145 LBS | BODY MASS INDEX: 26.68 KG/M2

## 2021-07-29 DIAGNOSIS — M75.121 NONTRAUMATIC COMPLETE TEAR OF RIGHT ROTATOR CUFF: Primary | ICD-10-CM

## 2021-07-29 PROCEDURE — 99213 OFFICE O/P EST LOW 20 MIN: CPT | Performed by: ORTHOPAEDIC SURGERY

## 2021-07-29 PROCEDURE — 3008F BODY MASS INDEX DOCD: CPT | Performed by: ORTHOPAEDIC SURGERY

## 2021-07-29 NOTE — PROGRESS NOTES
NURSING INTAKE COMMENTS: Patient presents with:  Shoulder Pain: right -- States pain interferes with sleep. Rates pain 5-6/10. States she has right chest pain as well. Right handed.        HPI: This 72year old female presents today with complaints of right TABLET BY MOUTH EVERY DAY 90 tablet 1   • ATORVASTATIN 10 MG Oral Tab TAKE 1 TABLET BY MOUTH EVERY DAY AT NIGHT 90 tablet 1   • AMLODIPINE BESYLATE 10 MG Oral Tab TAKE 1 TABLET BY MOUTH EVERY DAY 90 tablet 1   • triamcinolone acetonide 0.5 % External Cream Use: Never used    Substance and Sexual Activity      Alcohol use: No        Alcohol/week: 0.0 standard drinks      Drug use: No      Sexual activity: Not on file       Review of Systems:  GENERAL: denies fevers, chills, night sweats, fatigue, unintentiona Rito sign negative. No obvious atrophy of the hand. Imaging:   No results found.      Labs:  Lab Results   Component Value Date    WBC 5.9 06/24/2021    HGB 13.7 06/24/2021    .0 06/24/2021      Lab Results   Component Value Date    GLU 16

## 2021-08-05 ENCOUNTER — TELEPHONE (OUTPATIENT)
Dept: ORTHOPEDICS CLINIC | Facility: CLINIC | Age: 66
End: 2021-08-05

## 2021-08-05 DIAGNOSIS — M75.121 NONTRAUMATIC COMPLETE TEAR OF RIGHT ROTATOR CUFF: Primary | ICD-10-CM

## 2021-08-11 NOTE — TELEPHONE ENCOUNTER
Pt has not received a call back. Pt would like to schedule surgery. Please call pt or put on mychart.

## 2021-08-18 NOTE — TELEPHONE ENCOUNTER
Type of surgery: RIGHT SHOULDER ARTHROSCOPY, ROTATOR CUFF REPAIR  Date:9/20/21  Location:[facility]  Medical Clearance:      *Medical:YES      *Dental:      *Other:  Prior Authorization Status:PENDING  Workers Comp:  Medacta/Nayeli:  Checklist:  Other:

## 2021-08-20 ENCOUNTER — OFFICE VISIT (OUTPATIENT)
Dept: INTERNAL MEDICINE CLINIC | Facility: CLINIC | Age: 66
End: 2021-08-20
Payer: MEDICARE

## 2021-08-20 VITALS
WEIGHT: 146.63 LBS | BODY MASS INDEX: 26.98 KG/M2 | TEMPERATURE: 98 F | SYSTOLIC BLOOD PRESSURE: 132 MMHG | RESPIRATION RATE: 18 BRPM | HEART RATE: 68 BPM | DIASTOLIC BLOOD PRESSURE: 68 MMHG | HEIGHT: 62 IN

## 2021-08-20 DIAGNOSIS — I70.0 ATHEROSCLEROSIS OF AORTIC ARCH (HCC): ICD-10-CM

## 2021-08-20 DIAGNOSIS — E55.9 VITAMIN D DEFICIENCY: ICD-10-CM

## 2021-08-20 DIAGNOSIS — Z12.11 SCREENING FOR MALIGNANT NEOPLASM OF COLON: ICD-10-CM

## 2021-08-20 DIAGNOSIS — M77.8 RIGHT SHOULDER TENDINITIS: ICD-10-CM

## 2021-08-20 DIAGNOSIS — Z23 NEED FOR VACCINATION: ICD-10-CM

## 2021-08-20 DIAGNOSIS — E11.9 DIABETES MELLITUS TYPE 2, INSULIN DEPENDENT (HCC): ICD-10-CM

## 2021-08-20 DIAGNOSIS — E78.2 MIXED HYPERLIPIDEMIA: ICD-10-CM

## 2021-08-20 DIAGNOSIS — N18.30 CKD STAGE 3 DUE TO TYPE 2 DIABETES MELLITUS (HCC): ICD-10-CM

## 2021-08-20 DIAGNOSIS — Z00.00 ROUTINE PHYSICAL EXAMINATION: Primary | ICD-10-CM

## 2021-08-20 DIAGNOSIS — G47.33 OBSTRUCTIVE SLEEP APNEA SYNDROME: ICD-10-CM

## 2021-08-20 DIAGNOSIS — H61.90: ICD-10-CM

## 2021-08-20 DIAGNOSIS — F32.A DEPRESSIVE DISORDER: ICD-10-CM

## 2021-08-20 DIAGNOSIS — I10 ESSENTIAL HYPERTENSION: ICD-10-CM

## 2021-08-20 DIAGNOSIS — Z01.419 PAP SMEAR, AS PART OF ROUTINE GYNECOLOGICAL EXAMINATION: ICD-10-CM

## 2021-08-20 DIAGNOSIS — E11.22 CKD STAGE 3 DUE TO TYPE 2 DIABETES MELLITUS (HCC): ICD-10-CM

## 2021-08-20 DIAGNOSIS — Z79.4 DIABETES MELLITUS TYPE 2, INSULIN DEPENDENT (HCC): ICD-10-CM

## 2021-08-20 PROBLEM — R10.12 LEFT UPPER QUADRANT PAIN: Status: RESOLVED | Noted: 2019-08-12 | Resolved: 2021-08-20

## 2021-08-20 PROBLEM — M25.40 JOINT SWELLING: Status: RESOLVED | Noted: 2019-04-15 | Resolved: 2021-08-20

## 2021-08-20 PROBLEM — M25.571 ANKLE PAIN, RIGHT: Status: RESOLVED | Noted: 2017-05-08 | Resolved: 2021-08-20

## 2021-08-20 PROBLEM — E83.42 HYPOMAGNESEMIA: Status: RESOLVED | Noted: 2018-08-23 | Resolved: 2021-08-20

## 2021-08-20 PROBLEM — L03.311 ABDOMINAL WALL CELLULITIS: Status: RESOLVED | Noted: 2017-03-24 | Resolved: 2021-08-20

## 2021-08-20 PROBLEM — R07.2 PRECORDIAL CHEST PAIN: Status: RESOLVED | Noted: 2021-02-02 | Resolved: 2021-08-20

## 2021-08-20 PROCEDURE — 90471 IMMUNIZATION ADMIN: CPT | Performed by: INTERNAL MEDICINE

## 2021-08-20 PROCEDURE — 3008F BODY MASS INDEX DOCD: CPT | Performed by: INTERNAL MEDICINE

## 2021-08-20 PROCEDURE — 3075F SYST BP GE 130 - 139MM HG: CPT | Performed by: INTERNAL MEDICINE

## 2021-08-20 PROCEDURE — 99397 PER PM REEVAL EST PAT 65+ YR: CPT | Performed by: INTERNAL MEDICINE

## 2021-08-20 PROCEDURE — 90670 PCV13 VACCINE IM: CPT | Performed by: INTERNAL MEDICINE

## 2021-08-20 PROCEDURE — 3078F DIAST BP <80 MM HG: CPT | Performed by: INTERNAL MEDICINE

## 2021-08-20 RX ORDER — LEVOTHYROXINE SODIUM 0.05 MG/1
50 TABLET ORAL
Qty: 90 TABLET | Refills: 1 | Status: SHIPPED | OUTPATIENT
Start: 2021-08-20 | End: 2022-01-24

## 2021-08-20 NOTE — ASSESSMENT & PLAN NOTE
Normal exam.  Labs as ordered. Skin check normal.  No significant abnormal nevi. Breast exam completed–no palpable abnormalities, discharge from the nipples or axillary adenopathy. Mammogram and bone density scan ordered.     No cervical or inguinal lymp

## 2021-08-20 NOTE — ASSESSMENT & PLAN NOTE
Lipid panel and liver function test have been stable on fenofibrate and atorvastatin 10 mg daily. She has tolerated her medications well. Continue on the same dose of medication.

## 2021-08-20 NOTE — PROGRESS NOTES
HPI:   Kristofer Ac is a 72year old female who presents for an Annual Health Visit.      Pap Smear,3 Years due on 04/17/2018  Colonoscopy due on 12/16/2020  Mammogram due on 09/09/2021      Allergies:   No Known Allergies    CURRENT MEDICATIONS   Cur Does not apply Misc 1 each by Does not apply route 2 (two) times daily.  200 each 3      HISTORICAL INFORMATION   Past Medical History:   Diagnosis Date   • Arthritis    • Diverticular disease    • DUB (dysfunctional uterine bleeding)     Endometrial biopsy HENT: Negative. Eyes: Negative. Respiratory: Negative. Cardiovascular: Negative. Gastrointestinal: Negative. Endocrine: Negative. Genitourinary: Negative. Musculoskeletal: Negative. Skin: Negative.     Allergic/Immunologic: Vira Hassan Breasts: Breasts are symmetrical.      Right: No inverted nipple, mass, nipple discharge, skin change or tenderness. Left: No inverted nipple, mass, nipple discharge, skin change or tenderness.        Abdominal:      General: Bowel sounds are normal. Behavior: Behavior normal.         Thought Content:  Thought content normal.         Judgment: Judgment normal.          ASSESSMENT AND PLAN:   Charlotte was seen today for physical.    Diagnoses and all orders for this visit:    Routine physical examinat Intermittent depression, stable at this time. Has discontinued her Lexapro. Monitor for any recurrence. Diabetes mellitus type 2, insulin dependent ([facility] [address]     Patient has not brought in her blood sugar records. Last A1c was at 8.8.   Currently on smear completed. Relevant Orders    THINPREP PAP WITH HPV REFLEX REQUEST B    Right shoulder tendinitis     Patient has been seen by Dr. Rodriguez Vick and has been scheduled for arthroscopic surgery.          Routine physical examination - Primary     No Disorder of ear lobe, unspecified laterality        Relevant Orders    PLASTIC SURGERY - INTERNAL    Screening for malignant neoplasm of colon        Relevant Orders    GASTRO - INTERNAL    OCCULT BLOOD, FECAL, IMMUNOASSAY             The patient indicates

## 2021-08-20 NOTE — ASSESSMENT & PLAN NOTE
Patient on CPAP since 2014 and her machine is on recall and hence new orders have been provided. She has been using it on a regular basis, daytime fatigue and drowsiness have improved significantly.

## 2021-08-20 NOTE — PATIENT INSTRUCTIONS
Problem List Items Addressed This Visit        Unprioritized    Atherosclerosis of aortic arch ([address]     Asymptomatic atherosclerosis of the aorta. Lipid panel has been stable. Blood pressure has been stable. Continue to monitor.          CKD stage 3 due shortness of breath. No lower extremity edema. Continue on the same medications, recheck labs and follow-up         Hyperlipidemia     Lipid panel and liver function test have been stable on fenofibrate and atorvastatin 10 mg daily.   She has tolerated he due-provided         Sleep apnea     Patient on CPAP since 2014 and her machine is on recall and hence new orders have been provided. She has been using it on a regular basis, daytime fatigue and drowsiness have improved significantly.          Relevant Or

## 2021-08-20 NOTE — ASSESSMENT & PLAN NOTE
Asymptomatic atherosclerosis of the aorta. Lipid panel has been stable. Blood pressure has been stable. Continue to monitor.

## 2021-08-20 NOTE — ASSESSMENT & PLAN NOTE
Blood pressure 132/68, pulse 68, temperature 97.8 °F (36.6 °C), temperature source Other, resp. rate 18, height 5' 2\" (1.575 m), weight 146 lb 9.6 oz (66.5 kg), not currently breastfeeding.   Blood pressures remained stable, continue on losartan 100 mg leslye

## 2021-08-20 NOTE — ASSESSMENT & PLAN NOTE
Patient has not brought in her blood sugar records. Last A1c was at 8.8. Currently on NovoLog Mix 34 units in the morning, 30 units in the evening and Metformin 500 mg 2 tablets with lunch.   History of hypoglycemic emergency that had landed her in the [facility]

## 2021-08-20 NOTE — ASSESSMENT & PLAN NOTE
Intermittent but stable CKD stage III. Last labs look normal.  Advised to keep well-hydrated, avoid NSAIDs. Recheck labs as ordered.

## 2021-08-20 NOTE — ASSESSMENT & PLAN NOTE
Intermittent depression, stable at this time. Has discontinued her Lexapro. Monitor for any recurrence.

## 2021-08-23 ENCOUNTER — LAB ENCOUNTER (OUTPATIENT)
Dept: LAB | Facility: HOSPITAL | Age: 66
End: 2021-08-23
Attending: INTERNAL MEDICINE
Payer: COMMERCIAL

## 2021-08-23 DIAGNOSIS — Z12.11 SCREENING FOR MALIGNANT NEOPLASM OF COLON: ICD-10-CM

## 2021-08-23 LAB — HEMOCCULT STL QL: NEGATIVE

## 2021-08-23 PROCEDURE — 82274 ASSAY TEST FOR BLOOD FECAL: CPT

## 2021-08-24 ENCOUNTER — LAB ENCOUNTER (OUTPATIENT)
Dept: LAB | Age: 66
End: 2021-08-24
Attending: INTERNAL MEDICINE
Payer: COMMERCIAL

## 2021-08-24 DIAGNOSIS — Z79.4 DIABETES MELLITUS TYPE 2, INSULIN DEPENDENT (HCC): ICD-10-CM

## 2021-08-24 DIAGNOSIS — E55.9 VITAMIN D DEFICIENCY: ICD-10-CM

## 2021-08-24 DIAGNOSIS — E11.9 DIABETES MELLITUS TYPE 2, INSULIN DEPENDENT (HCC): ICD-10-CM

## 2021-08-24 LAB
ALBUMIN SERPL-MCNC: 3.7 G/DL (ref 3.4–5)
ALBUMIN/GLOB SERPL: 0.9 {RATIO} (ref 1–2)
ALP LIVER SERPL-CCNC: 69 U/L
ALT SERPL-CCNC: 35 U/L
ANION GAP SERPL CALC-SCNC: 5 MMOL/L (ref 0–18)
AST SERPL-CCNC: 20 U/L (ref 15–37)
BASOPHILS # BLD AUTO: 0.09 X10(3) UL (ref 0–0.2)
BASOPHILS NFR BLD AUTO: 1.9 %
BILIRUB SERPL-MCNC: 0.3 MG/DL (ref 0.1–2)
BILIRUB UR QL: NEGATIVE
BUN BLD-MCNC: 14 MG/DL (ref 7–18)
BUN/CREAT SERPL: 17.1 (ref 10–20)
CALCIUM BLD-MCNC: 9.4 MG/DL (ref 8.5–10.1)
CHLORIDE SERPL-SCNC: 103 MMOL/L (ref 98–112)
CHOLEST SMN-MCNC: 143 MG/DL (ref ?–200)
CLARITY UR: CLEAR
CO2 SERPL-SCNC: 30 MMOL/L (ref 21–32)
COLOR UR: YELLOW
CREAT BLD-MCNC: 0.82 MG/DL
CREAT UR-SCNC: 29.5 MG/DL
DEPRECATED RDW RBC AUTO: 36.7 FL (ref 35.1–46.3)
EOSINOPHIL # BLD AUTO: 0.25 X10(3) UL (ref 0–0.7)
EOSINOPHIL NFR BLD AUTO: 5.2 %
ERYTHROCYTE [DISTWIDTH] IN BLOOD BY AUTOMATED COUNT: 12.1 % (ref 11–15)
EST. AVERAGE GLUCOSE BLD GHB EST-MCNC: 200 MG/DL (ref 68–126)
GLOBULIN PLAS-MCNC: 3.9 G/DL (ref 2.8–4.4)
GLUCOSE BLD-MCNC: 172 MG/DL (ref 70–99)
GLUCOSE UR-MCNC: NEGATIVE MG/DL
HBA1C MFR BLD HPLC: 8.6 % (ref ?–5.7)
HCT VFR BLD AUTO: 39.6 %
HDLC SERPL-MCNC: 44 MG/DL (ref 40–59)
HGB BLD-MCNC: 12.8 G/DL
HGB UR QL STRIP.AUTO: NEGATIVE
IMM GRANULOCYTES # BLD AUTO: 0.01 X10(3) UL (ref 0–1)
IMM GRANULOCYTES NFR BLD: 0.2 %
KETONES UR-MCNC: NEGATIVE MG/DL
LDLC SERPL CALC-MCNC: 78 MG/DL (ref ?–100)
LEUKOCYTE ESTERASE UR QL STRIP.AUTO: NEGATIVE
LYMPHOCYTES # BLD AUTO: 1.35 X10(3) UL (ref 1–4)
LYMPHOCYTES NFR BLD AUTO: 27.8 %
M PROTEIN MFR SERPL ELPH: 7.6 G/DL (ref 6.4–8.2)
MCH RBC QN AUTO: 27.2 PG (ref 26–34)
MCHC RBC AUTO-ENTMCNC: 32.3 G/DL (ref 31–37)
MCV RBC AUTO: 84.3 FL
MICROALBUMIN UR-MCNC: 3.54 MG/DL
MICROALBUMIN/CREAT 24H UR-RTO: 120 UG/MG (ref ?–30)
MONOCYTES # BLD AUTO: 0.55 X10(3) UL (ref 0.1–1)
MONOCYTES NFR BLD AUTO: 11.3 %
NEUTROPHILS # BLD AUTO: 2.6 X10 (3) UL (ref 1.5–7.7)
NEUTROPHILS # BLD AUTO: 2.6 X10(3) UL (ref 1.5–7.7)
NEUTROPHILS NFR BLD AUTO: 53.6 %
NITRITE UR QL STRIP.AUTO: NEGATIVE
NONHDLC SERPL-MCNC: 99 MG/DL (ref ?–130)
OSMOLALITY SERPL CALC.SUM OF ELEC: 291 MOSM/KG (ref 275–295)
PATIENT FASTING Y/N/NP: YES
PATIENT FASTING Y/N/NP: YES
PH UR: 6 [PH] (ref 5–8)
PLATELET # BLD AUTO: 347 10(3)UL (ref 150–450)
POTASSIUM SERPL-SCNC: 4.2 MMOL/L (ref 3.5–5.1)
PROT UR-MCNC: NEGATIVE MG/DL
RBC # BLD AUTO: 4.7 X10(6)UL
SODIUM SERPL-SCNC: 138 MMOL/L (ref 136–145)
SP GR UR STRIP: 1.01 (ref 1–1.03)
TRIGL SERPL-MCNC: 115 MG/DL (ref 30–149)
TSI SER-ACNC: 2.69 MIU/ML (ref 0.36–3.74)
UROBILINOGEN UR STRIP-ACNC: <2
VIT B12 SERPL-MCNC: 810 PG/ML (ref 193–986)
VIT D+METAB SERPL-MCNC: 37.1 NG/ML (ref 30–100)
VLDLC SERPL CALC-MCNC: 18 MG/DL (ref 0–30)
WBC # BLD AUTO: 4.9 X10(3) UL (ref 4–11)

## 2021-08-24 PROCEDURE — 81003 URINALYSIS AUTO W/O SCOPE: CPT

## 2021-08-24 PROCEDURE — 84443 ASSAY THYROID STIM HORMONE: CPT

## 2021-08-24 PROCEDURE — 82570 ASSAY OF URINE CREATININE: CPT

## 2021-08-24 PROCEDURE — 80053 COMPREHEN METABOLIC PANEL: CPT

## 2021-08-24 PROCEDURE — 80061 LIPID PANEL: CPT

## 2021-08-24 PROCEDURE — 3060F POS MICROALBUMINURIA REV: CPT | Performed by: INTERNAL MEDICINE

## 2021-08-24 PROCEDURE — 82607 VITAMIN B-12: CPT

## 2021-08-24 PROCEDURE — 82306 VITAMIN D 25 HYDROXY: CPT

## 2021-08-24 PROCEDURE — 83036 HEMOGLOBIN GLYCOSYLATED A1C: CPT

## 2021-08-24 PROCEDURE — 36415 COLL VENOUS BLD VENIPUNCTURE: CPT

## 2021-08-24 PROCEDURE — 3052F HG A1C>EQUAL 8.0%<EQUAL 9.0%: CPT | Performed by: INTERNAL MEDICINE

## 2021-08-24 PROCEDURE — 82043 UR ALBUMIN QUANTITATIVE: CPT

## 2021-08-24 PROCEDURE — 85025 COMPLETE CBC W/AUTO DIFF WBC: CPT

## 2021-08-24 PROCEDURE — 3061F NEG MICROALBUMINURIA REV: CPT | Performed by: INTERNAL MEDICINE

## 2021-08-25 ENCOUNTER — TELEPHONE (OUTPATIENT)
Dept: INTERNAL MEDICINE CLINIC | Facility: CLINIC | Age: 66
End: 2021-08-25

## 2021-08-25 DIAGNOSIS — Z12.31 BREAST CANCER SCREENING BY MAMMOGRAM: Primary | ICD-10-CM

## 2021-08-25 NOTE — TELEPHONE ENCOUNTER
Dr. Omid Joseph, patient is requesting a mammogram order. Last mammogram was completed 09/09/2020. Please advise on order.        Impression   CONCLUSION:  No mammographic evidence for malignancy.  As long as the patient's clinical breast exam remains unchanged,

## 2021-09-02 RX ORDER — HYDROXYZINE HYDROCHLORIDE 10 MG/1
TABLET, FILM COATED ORAL
Qty: 90 TABLET | Refills: 1 | Status: SHIPPED | OUTPATIENT
Start: 2021-09-02 | End: 2022-01-07

## 2021-09-16 RX ORDER — MAGNESIUM OXIDE 400 MG (241.3 MG MAGNESIUM) TABLET
TABLET
Qty: 90 TABLET | Refills: 1 | Status: SHIPPED | OUTPATIENT
Start: 2021-09-16

## 2021-09-16 NOTE — TELEPHONE ENCOUNTER
Refill passed per [address] protocol.   Requested Prescriptions   Pending Prescriptions Disp Refills    MAGNESIUM OXIDE 400 MG Oral Tab [Pharmacy Med Name: MAGNESIUM OXIDE 400 MG TABLET] 90 tablet 1     Sig: TAKE 1 TABLET BY MOUTH EVERY DAY        Gas

## 2021-09-17 ENCOUNTER — TELEPHONE (OUTPATIENT)
Dept: ORTHOPEDICS CLINIC | Facility: CLINIC | Age: 66
End: 2021-09-17

## 2021-09-17 ENCOUNTER — HOSPITAL ENCOUNTER (OUTPATIENT)
Dept: MAMMOGRAPHY | Age: 66
Discharge: HOME | End: 2021-09-17
Attending: INTERNAL MEDICINE
Payer: COMMERCIAL

## 2021-09-17 ENCOUNTER — LAB ENCOUNTER (OUTPATIENT)
Dept: LAB | Age: 66
End: 2021-09-17
Attending: ORTHOPAEDIC SURGERY
Payer: COMMERCIAL

## 2021-09-17 DIAGNOSIS — M75.121 NONTRAUMATIC COMPLETE TEAR OF RIGHT ROTATOR CUFF: Primary | ICD-10-CM

## 2021-09-17 DIAGNOSIS — Z12.31 BREAST CANCER SCREENING BY MAMMOGRAM: ICD-10-CM

## 2021-09-17 DIAGNOSIS — Z01.818 PREOP TESTING: ICD-10-CM

## 2021-09-17 PROCEDURE — 77067 SCR MAMMO BI INCL CAD: CPT | Performed by: INTERNAL MEDICINE

## 2021-09-17 PROCEDURE — 77063 BREAST TOMOSYNTHESIS BI: CPT | Performed by: INTERNAL MEDICINE

## 2021-09-17 NOTE — TELEPHONE ENCOUNTER
Spoke with pt, advised seeing pcp asap for better control of DM, and discussed anticipating reschueduling surgery in about  6 weeks. She verbalized understanding.

## 2021-09-17 NOTE — TELEPHONE ENCOUNTER
Dr Mitch Wolf    Please advise patient's A1C result from 8/24/21 is 8.6. I will contact patient and let her know that surgery can not be done until A1C is 8 or below.

## 2021-09-17 NOTE — TELEPHONE ENCOUNTER
A1C cutoffs really apply more to elective-type surgeries. This is a semi-elective Rotator cuff surgery. Waiting for months to repair the tendon may have detrimental effects on outcome. I don't think waiting 3 or 4 months is appropriate.   I would like h

## 2021-09-17 NOTE — TELEPHONE ENCOUNTER
Tasking to surgery schedulers and Dr. Tom Sanchez and Jet Almaraz [place]  See original phone encounter from 08/05/21.

## 2021-09-18 LAB — SARS-COV-2 RNA RESP QL NAA+PROBE: NOT DETECTED

## 2021-10-07 ENCOUNTER — OFFICE VISIT (OUTPATIENT)
Dept: SURGERY | Facility: CLINIC | Age: 66
End: 2021-10-07

## 2021-10-07 DIAGNOSIS — S01.302S OPEN WOUND OF LEFT EAR, UNSPECIFIED OPEN WOUND TYPE, SEQUELA: Primary | ICD-10-CM

## 2021-10-07 PROCEDURE — 99243 OFF/OP CNSLTJ NEW/EST LOW 30: CPT | Performed by: PLASTIC SURGERY

## 2021-10-07 NOTE — H&P (VIEW-ONLY)
Amor Butler is a 72year old female that presents with Patient presents with: Other: Torn L ear lobe  .     REFERRED BY:  Allyson Craft    Pacemaker: No  Latex Allergy: no  Coumadin: No  Plavix: No  Other anticoagulants: No  Cardiac stents: No    HAND DO COLONOSCOPY  2007    Negative   • COLONOSCOPY & POLYPECTOMY  12/16/2010    Colonoscopy with Polypectomy and Biopsy, per NG   • ENDOMETRIAL BX CONJUNCT W/COLPOSCOPY     • UPPER GI ENDOSCOPY,BIOPSY  2011    EGD with Biopsy        ALLERIGIES  No Known Never Smoker      Smokeless tobacco: Never Used    Vaping Use      Vaping Use: Never used    Alcohol use: No      Alcohol/week: 0.0 standard drinks    Drug use: No       FAMILY HISTORY  Family History   Problem Relation Age of Onset   • Diabetes Father

## 2021-10-07 NOTE — PROGRESS NOTES
Patient request for surgery signed by patient and witnessed and signed by RN. Patient to take OTC meds post-operatively; patient instructed to call the office if post-operative pain is not relieved w/OTC meds.   Pre-Surgical Instruction Handout  given to a

## 2021-10-07 NOTE — H&P
Eugenie Faustin is a 72year old female that presents with Patient presents with: Other: Torn L ear lobe  .     REFERRED BY:  Shonda Whyte    Pacemaker: No  Latex Allergy: no  Coumadin: No  Plavix: No  Other anticoagulants: No  Cardiac stents: No    HAND DO COLONOSCOPY  2007    Negative   • COLONOSCOPY & POLYPECTOMY  12/16/2010    Colonoscopy with Polypectomy and Biopsy, per NG   • ENDOMETRIAL BX CONJUNCT W/COLPOSCOPY     • UPPER GI ENDOSCOPY,BIOPSY  2011    EGD with Biopsy        ALLERIGIES  No Known Never Smoker      Smokeless tobacco: Never Used    Vaping Use      Vaping Use: Never used    Alcohol use: No      Alcohol/week: 0.0 standard drinks    Drug use: No       FAMILY HISTORY  Family History   Problem Relation Age of Onset   • Diabetes Father

## 2021-10-12 ENCOUNTER — LAB ENCOUNTER (OUTPATIENT)
Dept: LAB | Age: 66
End: 2021-10-12
Attending: PLASTIC SURGERY
Payer: COMMERCIAL

## 2021-10-12 DIAGNOSIS — Z01.818 PRE-OP TESTING: ICD-10-CM

## 2021-10-13 ENCOUNTER — TELEPHONE (OUTPATIENT)
Dept: INTERNAL MEDICINE CLINIC | Facility: CLINIC | Age: 66
End: 2021-10-13

## 2021-10-13 NOTE — TELEPHONE ENCOUNTER
Patient called and advised that she is Completely Out of her Novalog Insulin. Patient advised she takes 30 in the Morning & 32 in the Evening. Please Advise.        Please Use Pharmacy: [facility]/pharmacy #[id number]- [place], [address] [facility]  [address] [facility]

## 2021-10-13 NOTE — TELEPHONE ENCOUNTER
Daniella Maria  8:26 AM                    Patient called and advised that she is Completely Out of her Novalog Insulin.  Patient advised she takes 30 in the Morning & 32 in the Evening.        Please Advise.         Please Use Pharmacy: [facility]/pharmacy

## 2021-10-14 ENCOUNTER — ANESTHESIA EVENT (OUTPATIENT)
Dept: SURGERY | Facility: HOSPITAL | Age: 66
End: 2021-10-14
Payer: COMMERCIAL

## 2021-10-15 ENCOUNTER — HOSPITAL ENCOUNTER (OUTPATIENT)
Facility: HOSPITAL | Age: 66
Setting detail: HOSPITAL OUTPATIENT SURGERY
Discharge: HOME | End: 2021-10-15
Attending: PLASTIC SURGERY | Admitting: PLASTIC SURGERY
Payer: COMMERCIAL

## 2021-10-15 ENCOUNTER — HOSPITAL DOCUMENTATION (OUTPATIENT)
Dept: SURGERY | Facility: CLINIC | Age: 66
End: 2021-10-15

## 2021-10-15 ENCOUNTER — ANESTHESIA (OUTPATIENT)
Dept: SURGERY | Facility: HOSPITAL | Age: 66
End: 2021-10-15
Payer: COMMERCIAL

## 2021-10-15 VITALS
BODY MASS INDEX: 26.68 KG/M2 | SYSTOLIC BLOOD PRESSURE: 150 MMHG | HEART RATE: 73 BPM | OXYGEN SATURATION: 97 % | RESPIRATION RATE: 16 BRPM | HEIGHT: 62 IN | TEMPERATURE: 97 F | WEIGHT: 145 LBS | DIASTOLIC BLOOD PRESSURE: 74 MMHG

## 2021-10-15 DIAGNOSIS — Z01.818 PRE-OP TESTING: Primary | ICD-10-CM

## 2021-10-15 DIAGNOSIS — S01.302S OPEN WOUND OF LEFT EAR, UNSPECIFIED OPEN WOUND TYPE, SEQUELA: ICD-10-CM

## 2021-10-15 DIAGNOSIS — S01.302S OPEN WOUND OF LEFT EAR, UNSPECIFIED OPEN WOUND TYPE, SEQUELA: Primary | ICD-10-CM

## 2021-10-15 PROCEDURE — 0HX3XZZ TRANSFER LEFT EAR SKIN, EXTERNAL APPROACH: ICD-10-PCS | Performed by: PLASTIC SURGERY

## 2021-10-15 PROCEDURE — 14060 TIS TRNFR E/N/E/L 10 SQ CM/<: CPT | Performed by: PLASTIC SURGERY

## 2021-10-15 RX ORDER — HYDROCODONE BITARTRATE AND ACETAMINOPHEN 5; 325 MG/1; MG/1
1 TABLET ORAL AS NEEDED
Status: DISCONTINUED | OUTPATIENT
Start: 2021-10-15 | End: 2021-10-15

## 2021-10-15 RX ORDER — DEXAMETHASONE SODIUM PHOSPHATE 4 MG/ML
VIAL (ML) INJECTION AS NEEDED
Status: DISCONTINUED | OUTPATIENT
Start: 2021-10-15 | End: 2021-10-15 | Stop reason: SURG

## 2021-10-15 RX ORDER — MORPHINE SULFATE 4 MG/ML
4 INJECTION, SOLUTION INTRAMUSCULAR; INTRAVENOUS EVERY 10 MIN PRN
Status: DISCONTINUED | OUTPATIENT
Start: 2021-10-15 | End: 2021-10-15

## 2021-10-15 RX ORDER — HYDROMORPHONE HYDROCHLORIDE 1 MG/ML
0.6 INJECTION, SOLUTION INTRAMUSCULAR; INTRAVENOUS; SUBCUTANEOUS EVERY 5 MIN PRN
Status: DISCONTINUED | OUTPATIENT
Start: 2021-10-15 | End: 2021-10-15

## 2021-10-15 RX ORDER — ACETAMINOPHEN 500 MG
1000 TABLET ORAL ONCE
Status: COMPLETED | OUTPATIENT
Start: 2021-10-15 | End: 2021-10-15

## 2021-10-15 RX ORDER — HYDROMORPHONE HYDROCHLORIDE 1 MG/ML
0.2 INJECTION, SOLUTION INTRAMUSCULAR; INTRAVENOUS; SUBCUTANEOUS EVERY 5 MIN PRN
Status: DISCONTINUED | OUTPATIENT
Start: 2021-10-15 | End: 2021-10-15

## 2021-10-15 RX ORDER — MORPHINE SULFATE 4 MG/ML
2 INJECTION, SOLUTION INTRAMUSCULAR; INTRAVENOUS EVERY 10 MIN PRN
Status: DISCONTINUED | OUTPATIENT
Start: 2021-10-15 | End: 2021-10-15

## 2021-10-15 RX ORDER — ONDANSETRON 2 MG/ML
4 INJECTION INTRAMUSCULAR; INTRAVENOUS ONCE AS NEEDED
Status: DISCONTINUED | OUTPATIENT
Start: 2021-10-15 | End: 2021-10-15

## 2021-10-15 RX ORDER — HYDROMORPHONE HYDROCHLORIDE 1 MG/ML
0.4 INJECTION, SOLUTION INTRAMUSCULAR; INTRAVENOUS; SUBCUTANEOUS EVERY 5 MIN PRN
Status: DISCONTINUED | OUTPATIENT
Start: 2021-10-15 | End: 2021-10-15

## 2021-10-15 RX ORDER — ONDANSETRON 2 MG/ML
INJECTION INTRAMUSCULAR; INTRAVENOUS AS NEEDED
Status: DISCONTINUED | OUTPATIENT
Start: 2021-10-15 | End: 2021-10-15 | Stop reason: SURG

## 2021-10-15 RX ORDER — SODIUM CHLORIDE, SODIUM LACTATE, POTASSIUM CHLORIDE, CALCIUM CHLORIDE 600; 310; 30; 20 MG/100ML; MG/100ML; MG/100ML; MG/100ML
INJECTION, SOLUTION INTRAVENOUS CONTINUOUS
Status: DISCONTINUED | OUTPATIENT
Start: 2021-10-15 | End: 2021-10-15

## 2021-10-15 RX ORDER — CEFAZOLIN SODIUM/WATER 2 G/20 ML
2 SYRINGE (ML) INTRAVENOUS ONCE
Status: COMPLETED | OUTPATIENT
Start: 2021-10-15 | End: 2021-10-15

## 2021-10-15 RX ORDER — EPHEDRINE SULFATE 50 MG/ML
INJECTION INTRAVENOUS AS NEEDED
Status: DISCONTINUED | OUTPATIENT
Start: 2021-10-15 | End: 2021-10-15 | Stop reason: SURG

## 2021-10-15 RX ORDER — PROCHLORPERAZINE EDISYLATE 5 MG/ML
5 INJECTION INTRAMUSCULAR; INTRAVENOUS ONCE AS NEEDED
Status: DISCONTINUED | OUTPATIENT
Start: 2021-10-15 | End: 2021-10-15

## 2021-10-15 RX ORDER — OXYCODONE HCL 10 MG/1
10 TABLET, FILM COATED, EXTENDED RELEASE ORAL EVERY 12 HOURS
Status: DISCONTINUED | OUTPATIENT
Start: 2021-10-15 | End: 2021-10-15 | Stop reason: HOSPADM

## 2021-10-15 RX ORDER — ACETAMINOPHEN 500 MG
500 TABLET ORAL EVERY 4 HOURS PRN
Status: DISCONTINUED | OUTPATIENT
Start: 2021-10-15 | End: 2021-10-15

## 2021-10-15 RX ORDER — HYDROCODONE BITARTRATE AND ACETAMINOPHEN 5; 325 MG/1; MG/1
2 TABLET ORAL AS NEEDED
Status: DISCONTINUED | OUTPATIENT
Start: 2021-10-15 | End: 2021-10-15

## 2021-10-15 RX ORDER — MORPHINE SULFATE 10 MG/ML
6 INJECTION, SOLUTION INTRAMUSCULAR; INTRAVENOUS EVERY 10 MIN PRN
Status: DISCONTINUED | OUTPATIENT
Start: 2021-10-15 | End: 2021-10-15

## 2021-10-15 RX ORDER — LIDOCAINE HYDROCHLORIDE 10 MG/ML
INJECTION, SOLUTION EPIDURAL; INFILTRATION; INTRACAUDAL; PERINEURAL AS NEEDED
Status: DISCONTINUED | OUTPATIENT
Start: 2021-10-15 | End: 2021-10-15 | Stop reason: SURG

## 2021-10-15 RX ORDER — INSULIN ASPART 100 [IU]/ML
INJECTION, SUSPENSION SUBCUTANEOUS
Qty: 105 ML | Refills: 2 | Status: SHIPPED | OUTPATIENT
Start: 2021-10-15

## 2021-10-15 RX ORDER — NALOXONE HYDROCHLORIDE 0.4 MG/ML
80 INJECTION, SOLUTION INTRAMUSCULAR; INTRAVENOUS; SUBCUTANEOUS AS NEEDED
Status: DISCONTINUED | OUTPATIENT
Start: 2021-10-15 | End: 2021-10-15

## 2021-10-15 RX ORDER — LIDOCAINE HYDROCHLORIDE AND EPINEPHRINE 10; 10 MG/ML; UG/ML
INJECTION, SOLUTION INFILTRATION; PERINEURAL AS NEEDED
Status: DISCONTINUED | OUTPATIENT
Start: 2021-10-15 | End: 2021-10-15 | Stop reason: HOSPADM

## 2021-10-15 RX ORDER — DEXTROSE MONOHYDRATE 25 G/50ML
50 INJECTION, SOLUTION INTRAVENOUS
Status: DISCONTINUED | OUTPATIENT
Start: 2021-10-15 | End: 2021-10-15

## 2021-10-15 RX ADMIN — SODIUM CHLORIDE, SODIUM LACTATE, POTASSIUM CHLORIDE, CALCIUM CHLORIDE: 600; 310; 30; 20 INJECTION, SOLUTION INTRAVENOUS at 08:00:00

## 2021-10-15 RX ADMIN — SODIUM CHLORIDE, SODIUM LACTATE, POTASSIUM CHLORIDE, CALCIUM CHLORIDE: 600; 310; 30; 20 INJECTION, SOLUTION INTRAVENOUS at 07:19:00

## 2021-10-15 RX ADMIN — CEFAZOLIN SODIUM/WATER 2 G: 2 G/20 ML SYRINGE (ML) INTRAVENOUS at 07:32:00

## 2021-10-15 RX ADMIN — ONDANSETRON 4 MG: 2 INJECTION INTRAMUSCULAR; INTRAVENOUS at 07:20:00

## 2021-10-15 RX ADMIN — EPHEDRINE SULFATE 5 MG: 50 INJECTION INTRAVENOUS at 07:38:00

## 2021-10-15 RX ADMIN — LIDOCAINE HYDROCHLORIDE 50 MG: 10 INJECTION, SOLUTION EPIDURAL; INFILTRATION; INTRACAUDAL; PERINEURAL at 07:22:00

## 2021-10-15 RX ADMIN — DEXAMETHASONE SODIUM PHOSPHATE 4 MG: 4 MG/ML VIAL (ML) INJECTION at 07:20:00

## 2021-10-15 RX ADMIN — EPHEDRINE SULFATE 10 MG: 50 INJECTION INTRAVENOUS at 07:44:00

## 2021-10-15 RX ADMIN — EPHEDRINE SULFATE 10 MG: 50 INJECTION INTRAVENOUS at 07:29:00
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
## 2021-10-15 NOTE — OPERATIVE REPORT
[facility]    PATIENT'S NAME: Shayy Denise PHYSICIAN: Jeffry Dash MD   OPERATING PHYSICIAN: Jeffry Dash MD   PATIENT ACCOUNT#:   [id number]    LOCATION:  [address]  MEDICAL RECORD #:   [id number]

## 2021-10-15 NOTE — ANESTHESIA POSTPROCEDURE EVALUATION
Patient: Francisco Acevedo    Procedure Summary     Date: 10/15/21 Room / Location: [address] [place] [address] MAIN OR 01 / [address] [facility] OR    Anesthesia Start: 0821 Anesthesia Stop: 0813    Procedure: LEFT EAR LOBULAR RECONSTRUCTION WITH FLAP (Left Ear) Diagnosis:       Open wound of

## 2021-10-15 NOTE — ANESTHESIA PROCEDURE NOTES
Airway  Date/Time: 10/15/2021 7:32 AM  Urgency: Elective      General Information and Staff    Patient location during procedure: OR  Anesthesiologist: Blank Servin MD  Performed: anesthesiologist     Indications and Patient Condition  Indications for

## 2021-10-16 ENCOUNTER — TELEPHONE (OUTPATIENT)
Dept: SURGERY | Facility: CLINIC | Age: 66
End: 2021-10-16

## 2021-10-16 NOTE — TELEPHONE ENCOUNTER
Left message for post-operative patient to please call the office with any questions and/or concerns. Reminded patient of next RN appointment on 10/27 at [place] [phone].  Dr. Juan Siddiqi notified.

## 2021-10-20 ENCOUNTER — NURSE TRIAGE (OUTPATIENT)
Dept: INTERNAL MEDICINE CLINIC | Facility: CLINIC | Age: 66
End: 2021-10-20

## 2021-10-20 NOTE — TELEPHONE ENCOUNTER
Action Requested: Summary for Provider     []  Critical Lab, Recommendations Needed  [] Need Additional Advice  []   FYI    []   Need Orders  [] Need Medications Sent to Pharmacy  []  Other     SUMMARY: Appt made for next Thursday with Dr. Jesus Mata with PCP

## 2021-10-27 ENCOUNTER — NURSE ONLY (OUTPATIENT)
Dept: SURGERY | Facility: CLINIC | Age: 66
End: 2021-10-27
Payer: MEDICARE

## 2021-10-27 DIAGNOSIS — Z48.02 ENCOUNTER FOR REMOVAL OF SUTURES: Primary | ICD-10-CM

## 2021-10-27 DIAGNOSIS — S01.302S OPEN WOUND OF LEFT EAR, UNSPECIFIED OPEN WOUND TYPE, SEQUELA: ICD-10-CM

## 2021-10-27 NOTE — PROGRESS NOTES
Surgery 1: L ear lobular flap  - Date: 10/15/21  - Days Since: 12    Patient here for suture removal to L ear lobular. Patient identified with 2 identifiers and orders verified. Patient presents w/steri-strip clean, dry, intact.   Patient denies complaint

## 2021-10-28 ENCOUNTER — OFFICE VISIT (OUTPATIENT)
Dept: INTERNAL MEDICINE CLINIC | Facility: CLINIC | Age: 66
End: 2021-10-28
Payer: MEDICARE

## 2021-10-28 VITALS
BODY MASS INDEX: 27.2 KG/M2 | WEIGHT: 147.81 LBS | HEIGHT: 62 IN | RESPIRATION RATE: 16 BRPM | SYSTOLIC BLOOD PRESSURE: 136 MMHG | DIASTOLIC BLOOD PRESSURE: 76 MMHG | HEART RATE: 78 BPM | TEMPERATURE: 98 F

## 2021-10-28 DIAGNOSIS — G47.33 OBSTRUCTIVE SLEEP APNEA SYNDROME: ICD-10-CM

## 2021-10-28 DIAGNOSIS — I10 ESSENTIAL HYPERTENSION: ICD-10-CM

## 2021-10-28 DIAGNOSIS — E11.22 CKD STAGE 3 DUE TO TYPE 2 DIABETES MELLITUS (HCC): ICD-10-CM

## 2021-10-28 DIAGNOSIS — M54.12 CERVICAL RADICULOPATHY: ICD-10-CM

## 2021-10-28 DIAGNOSIS — Z79.4 DIABETES MELLITUS TYPE 2, INSULIN DEPENDENT (HCC): Primary | ICD-10-CM

## 2021-10-28 DIAGNOSIS — E11.9 DIABETES MELLITUS TYPE 2, INSULIN DEPENDENT (HCC): Primary | ICD-10-CM

## 2021-10-28 DIAGNOSIS — E78.2 MIXED HYPERLIPIDEMIA: ICD-10-CM

## 2021-10-28 DIAGNOSIS — F32.A MILD DEPRESSIVE DISORDER: ICD-10-CM

## 2021-10-28 DIAGNOSIS — Z23 NEED FOR VACCINATION: ICD-10-CM

## 2021-10-28 DIAGNOSIS — E55.9 VITAMIN D DEFICIENCY: ICD-10-CM

## 2021-10-28 DIAGNOSIS — N18.30 CKD STAGE 3 DUE TO TYPE 2 DIABETES MELLITUS (HCC): ICD-10-CM

## 2021-10-28 PROCEDURE — 99214 OFFICE O/P EST MOD 30 MIN: CPT | Performed by: INTERNAL MEDICINE

## 2021-10-28 PROCEDURE — 3008F BODY MASS INDEX DOCD: CPT | Performed by: INTERNAL MEDICINE

## 2021-10-28 PROCEDURE — 90471 IMMUNIZATION ADMIN: CPT | Performed by: INTERNAL MEDICINE

## 2021-10-28 PROCEDURE — 3075F SYST BP GE 130 - 139MM HG: CPT | Performed by: INTERNAL MEDICINE

## 2021-10-28 PROCEDURE — 90662 IIV NO PRSV INCREASED AG IM: CPT | Performed by: INTERNAL MEDICINE

## 2021-10-28 PROCEDURE — 3078F DIAST BP <80 MM HG: CPT | Performed by: INTERNAL MEDICINE

## 2021-10-28 RX ORDER — AMLODIPINE BESYLATE 10 MG/1
10 TABLET ORAL DAILY
Qty: 90 TABLET | Refills: 2 | Status: SHIPPED | OUTPATIENT
Start: 2021-10-28

## 2021-10-28 RX ORDER — TIZANIDINE 2 MG/1
2 TABLET ORAL NIGHTLY
Qty: 30 TABLET | Refills: 1 | Status: SHIPPED | OUTPATIENT
Start: 2021-10-28 | End: 2021-11-21

## 2021-10-28 RX ORDER — ESCITALOPRAM OXALATE 5 MG/1
5 TABLET ORAL DAILY
Qty: 30 TABLET | Refills: 3 | Status: SHIPPED | OUTPATIENT
Start: 2021-10-28 | End: 2021-11-07

## 2021-10-28 RX ORDER — METFORMIN HYDROCHLORIDE 500 MG/1
1000 TABLET, EXTENDED RELEASE ORAL
Qty: 180 TABLET | Refills: 2 | Status: SHIPPED | OUTPATIENT
Start: 2021-10-28

## 2021-10-28 NOTE — ASSESSMENT & PLAN NOTE
Recurrent mild depressive disorder. She discontinued her Lexapro a while back. Her symptoms are concerning at this time as patient is very tearful and very easily affected by stressors. She has been restarted on Lexapro at 5 mg daily.   Follow-up in 6 we

## 2021-10-28 NOTE — ASSESSMENT & PLAN NOTE
Blood sugars have been consistently above 8. She is currently on NovoLog Mix 34 units in the morning, 30 units in the evening and Metformin 500 mg 2 tablets at lunchtime. Her home sugars have all been above 200. She does not have any records here.   She

## 2021-10-28 NOTE — PATIENT INSTRUCTIONS
Problem List Items Addressed This Visit        Unprioritized    Cervical radiculopathy     Intermittent episodes of neck pain, stiffness. Soreness at this time. No radicular symptoms.   She has had an MRI of the C-spine in the past.  She has been on gabap WITH PLATELET    HEMOGLOBIN A1C    LIPID PANEL    MICROALB/CREAT RATIO, RANDOM URINE    URINALYSIS, ROUTINE    VITAMIN D    TSH W REFLEX TO FREE T4    Essential hypertension     Blood pressure 136/76, pulse 78, temperature 98.3 °F (36.8 °C), temperature so HIGH DOSE PRSV FREE

## 2021-10-28 NOTE — ASSESSMENT & PLAN NOTE
CKD stage III–intermittent. Last labs look stable. Advised to keep well-hydrated, avoid NSAIDs and recheck labs as noted.

## 2021-10-28 NOTE — ASSESSMENT & PLAN NOTE
Blood pressure 136/76, pulse 78, temperature 98.3 °F (36.8 °C), temperature source Temporal, resp. rate 16, height 5' 2\" (1.575 m), weight 147 lb 12.8 oz (67 kg), not currently breastfeeding. Blood pressure upon recheck improved.   Fluctuating pressure

## 2021-10-28 NOTE — ASSESSMENT & PLAN NOTE
History of MAXIMINO on CPAP since 2014. She has been using her machine. She has been complaining of some headache recently but seems to be related to stress, discussed medications, she is willing to start on Lexapro at this time. Prescription provided.   Will

## 2021-10-28 NOTE — ASSESSMENT & PLAN NOTE
Lipid panel liver function test have been stable on fenofibrate and atorvastatin at 10 mg daily. Continue the same dose of medication, recheck labs as discussed.

## 2021-10-28 NOTE — PROGRESS NOTES
HPI:    Patient ID: Angelina You is a 72year old female. Labs completed 8/2021-discussed  hba1c 8.6    Mammogram normal.    Headache   This is a recurrent problem. The current episode started yesterday. The problem occurs intermittently.  The problem diabetic complications include no PVD.  Risk factors for coronary artery disease include diabetes mellitus, dyslipidemia, hypertension, male sex, sedentary lifestyle, stress and post-menopausal. Current diabetic treatment includes diet, insulin injections a Pfizer 30 mcg/0.3 ml                          01/11/2021 01/11/2021 02/01/2021 02/01/2021      FLULAVAL 6 months & older 0.5 ml Prefilled syringe ([id number])                          10/09/2017  10/04/2018  11/11/2019 FlexPen 100 UNIT/ML Susp Pen-injector (Insulin Aspart Prot & Aspart 70/30) INJECT 55 UNITS SUBCUTANEOUS IN THE MORNING AND 50 UNITS AT NIGHT.  105 mL 2   • magnesium oxide 400 MG Oral Tab TAKE 1 TABLET BY MOUTH EVERY DAY 90 tablet 1   • HYDROXYZINE 10 MG Or are equal, round, and reactive to light. Neck:      Vascular: No JVD. Cardiovascular:      Rate and Rhythm: Normal rate and regular rhythm. Heart sounds: Normal heart sounds. No murmur heard.       Pulmonary:      Effort: Pulmonary effort is normal improved. Fluctuating pressures with stressors. Has been started on medications at this time. Current medications for blood pressure–losartan 100 mg daily, amlodipine 10 mg daily.   Continue on the same dose of medication, kidney functions have been stab 2-3 times daily. Call if symptoms are not improved. Diabetes mellitus type 2, insulin dependent ([facility] [address] - Primary     Blood sugars have been consistently above 8.   She is currently on NovoLog Mix 34 units in the morning, 30 units in the evening an Panel      Microalb/Creat Ratio, Random Urine      Urinalysis, Routine      Vitamin D      TSH W Reflex To Free T4      Fluzone High Dose  65 years and older [80037]      Meds This Visit:  Requested Prescriptions     Signed Prescriptions Disp Refills   • m

## 2021-10-28 NOTE — ASSESSMENT & PLAN NOTE
Intermittent episodes of neck pain, stiffness. Soreness at this time. No radicular symptoms. She has had an MRI of the C-spine in the past.  She has been on gabapentin.   She has had a treatment with steroids in the past.  MRI C-spine in 2018 showed face

## 2021-11-01 RX ORDER — ATORVASTATIN CALCIUM 10 MG/1
10 TABLET, FILM COATED ORAL NIGHTLY
Qty: 90 TABLET | Refills: 1 | Status: SHIPPED | OUTPATIENT
Start: 2021-11-01

## 2021-11-01 NOTE — TELEPHONE ENCOUNTER
Refill passed per [facility] protocol.     Requested Prescriptions   Pending Prescriptions Disp Refills    ATORVASTATIN 10 MG Oral Tab [Pharmacy Med Name: ATORVASTATIN 10 MG TABLET] 90 tablet 1     Sig: TAKE 1 TABLET BY MOUTH EVERY DAY AT NIGHT

## 2021-11-06 DIAGNOSIS — M25.511 RIGHT SHOULDER PAIN, UNSPECIFIED CHRONICITY: ICD-10-CM

## 2021-11-07 RX ORDER — ESCITALOPRAM OXALATE 5 MG/1
TABLET ORAL
Qty: 90 TABLET | Refills: 1 | Status: SHIPPED | OUTPATIENT
Start: 2021-11-07 | End: 2021-12-29

## 2021-11-07 RX ORDER — LOSARTAN POTASSIUM 100 MG/1
TABLET ORAL
Qty: 90 TABLET | Refills: 1 | Status: SHIPPED | OUTPATIENT
Start: 2021-11-07

## 2021-11-07 NOTE — TELEPHONE ENCOUNTER
Refill passed per [facility] protocol    Requested Prescriptions   Pending Prescriptions Disp Refills    ESCITALOPRAM 5 MG Oral Tab [Pharmacy Med Name: ESCITALOPRAM 5 MG TABLET] 90 tablet 1     Sig: TAKE 1 TABLET BY MOUTH EVERY DAY        Psychiatric

## 2021-11-09 RX ORDER — MELOXICAM 15 MG/1
TABLET ORAL
Qty: 30 TABLET | Refills: 0 | OUTPATIENT
Start: 2021-11-09

## 2021-11-09 NOTE — TELEPHONE ENCOUNTER
Medication request: Meloxicam 15 MG Oral Tab   Sig:   Take 1 tablet (15 mg total) by mouth daily as needed for Pain. Qty#30R-0    LOV: 4/6/21  NOV: None    [place] 3[place]15/21        LMTCB - 1st attempt    Auto refill?

## 2021-11-21 RX ORDER — TIZANIDINE 2 MG/1
TABLET ORAL
Qty: 30 TABLET | Refills: 1 | Status: SHIPPED | OUTPATIENT
Start: 2021-11-21 | End: 2021-12-29

## 2021-11-23 NOTE — TELEPHONE ENCOUNTER
Requested Prescriptions     Pending Prescriptions Disp Refills   • PANTOPRAZOLE 40 MG Oral Tab EC [Pharmacy Med Name: PANTOPRAZOLE SOD DR 40 MG TAB] 90 tablet 1     Sig: TAKE 1 TABLET BY MOUTH EVERY DAY IN THE MORNING BEFORE BREAKFAST     LOV  5/04/2020  L

## 2021-11-24 RX ORDER — PANTOPRAZOLE SODIUM 40 MG/1
TABLET, DELAYED RELEASE ORAL
Qty: 90 TABLET | Refills: 1 | Status: SHIPPED | OUTPATIENT
Start: 2021-11-24 | End: 2021-12-29

## 2021-12-03 ENCOUNTER — OFFICE VISIT (OUTPATIENT)
Dept: GASTROENTEROLOGY | Facility: CLINIC | Age: 66
End: 2021-12-03
Payer: MEDICARE

## 2021-12-03 ENCOUNTER — TELEPHONE (OUTPATIENT)
Dept: GASTROENTEROLOGY | Facility: CLINIC | Age: 66
End: 2021-12-03

## 2021-12-03 VITALS
HEIGHT: 62 IN | WEIGHT: 146 LBS | SYSTOLIC BLOOD PRESSURE: 137 MMHG | HEART RATE: 89 BPM | DIASTOLIC BLOOD PRESSURE: 82 MMHG | BODY MASS INDEX: 26.87 KG/M2

## 2021-12-03 DIAGNOSIS — Z12.11 COLON CANCER SCREENING: Primary | ICD-10-CM

## 2021-12-03 DIAGNOSIS — K21.9 GASTROESOPHAGEAL REFLUX DISEASE WITHOUT ESOPHAGITIS: Primary | ICD-10-CM

## 2021-12-03 PROCEDURE — 3008F BODY MASS INDEX DOCD: CPT | Performed by: INTERNAL MEDICINE

## 2021-12-03 PROCEDURE — 3079F DIAST BP 80-89 MM HG: CPT | Performed by: INTERNAL MEDICINE

## 2021-12-03 PROCEDURE — 3075F SYST BP GE 130 - 139MM HG: CPT | Performed by: INTERNAL MEDICINE

## 2021-12-03 PROCEDURE — 99213 OFFICE O/P EST LOW 20 MIN: CPT | Performed by: INTERNAL MEDICINE

## 2021-12-03 RX ORDER — POLYETHYLENE GLYCOL 3350, SODIUM CHLORIDE, SODIUM BICARBONATE, POTASSIUM CHLORIDE 420; 11.2; 5.72; 1.48 G/4L; G/4L; G/4L; G/4L
POWDER, FOR SOLUTION ORAL
Qty: 1 EACH | Refills: 0 | Status: SHIPPED | OUTPATIENT
Start: 2021-12-03 | End: 2021-12-29

## 2021-12-03 RX ORDER — HYDROQUINONE 40 MG/G
CREAM TOPICAL
COMMUNITY
Start: 2021-11-11

## 2021-12-03 RX ORDER — MELOXICAM 15 MG/1
15 TABLET ORAL DAILY
COMMUNITY
End: 2022-01-26

## 2021-12-03 NOTE — PATIENT INSTRUCTIONS
1. 1. Schedule colonoscopy with [facility] [Diagnosis: screening]    2.  bowel prep from pharmacy (single dose Trilyte)    3. Medication adjustment:       A. Day BEFORE colonoscopy: HOLD metformin     B.  Day OF colonoscopy: HOLD metformin, losartan     C.

## 2021-12-03 NOTE — TELEPHONE ENCOUNTER
RN's     Patient is scheduled for a Colonoscopy @ [facility] on 2/07/2022 and is on diabetic meds, see message below:     B. Day OF colonoscopy: HOLD metformin, losartan     C.  Continue ALL other medications as usual               >>WE WILL CONTACT YOUR PRIMARY

## 2021-12-03 NOTE — TELEPHONE ENCOUNTER
Scheduled for: Colonoscopy [id number]    Provider Name: Dr Delio Clifton    Date:  Mon 2/07/2022   Location: [facility]   Sedation: MAC   Time: 8:45 am, (pt is aware that [facility] will call the day before to confirm arrival time)  Prep: single colyte  Meds/Allergies Reconciled?: N

## 2021-12-03 NOTE — PROGRESS NOTES
[address] [facility] [address] Gastroenterology                                                                                                      Clinic Follow-up Visit    Marcelina cm.  3. The stomach was viewed in its entirety.  There was moderate gastric  erythema predominantly in the prepyloric region.  No ulcerations or mass  lesions were seen.  Biopsies times four were taken of the gastric antrum.   Biopsies times four were taken Right    • CHOLECYSTECTOMY  1991   • COLONOSCOPY  2007    Negative   • COLONOSCOPY & POLYPECTOMY  12/16/2010    Colonoscopy with Polypectomy and Biopsy, per NG   • ENDOMETRIAL BX CONJUNCT W/COLPOSCOPY     • UPPER GI ENDOSCOPY,BIOPSY  2011    EGD wit TABLET BY MOUTH EVERY DAY 90 tablet 1   • Levothyroxine Sodium 50 MCG Oral Tab Take 1 tablet (50 mcg total) by mouth before breakfast. 90 tablet 1   • Fluocinonide 0.05 % External Cream      • fenofibrate micronized 134 MG Oral Cap Take 1 capsule (134 mg t movement of all 4 extremities   Psych - appropriate, non-agitated    Labs/Imaging:     Patient's labs and imaging were reviewed and discussed with patient today.      .  ASSESSMENT/PLAN:   Charlotte Hernández is a 77year old year-old female with history of dive start any NEW medication after your visit today, please notify us. Certain medications will need to be held before the procedure, or the procedure cannot be performed.         8. WHEN YOU ARE DUE FOR REFILL OF PANTOPRAZOLE< LET ME KNOW SO WE CAN LOWER THE D

## 2021-12-03 NOTE — TELEPHONE ENCOUNTER
Dr. Akil Toro,    Please advise on all diabetic (oral & insulin) adjustment orders based on the following diet modifications prior to procedure on 2/7/22:    Day before the procedure(s), patient will be on clear liquid diet only after breakfast.    Thank you!

## 2021-12-04 ENCOUNTER — LAB ENCOUNTER (OUTPATIENT)
Dept: LAB | Age: 66
End: 2021-12-04
Attending: INTERNAL MEDICINE
Payer: COMMERCIAL

## 2021-12-04 DIAGNOSIS — E11.9 DIABETES MELLITUS TYPE 2, INSULIN DEPENDENT (HCC): ICD-10-CM

## 2021-12-04 DIAGNOSIS — Z79.4 DIABETES MELLITUS TYPE 2, INSULIN DEPENDENT (HCC): ICD-10-CM

## 2021-12-04 PROCEDURE — 85025 COMPLETE CBC W/AUTO DIFF WBC: CPT

## 2021-12-04 PROCEDURE — 83036 HEMOGLOBIN GLYCOSYLATED A1C: CPT

## 2021-12-04 PROCEDURE — 81003 URINALYSIS AUTO W/O SCOPE: CPT

## 2021-12-04 PROCEDURE — 36415 COLL VENOUS BLD VENIPUNCTURE: CPT

## 2021-12-04 PROCEDURE — 80061 LIPID PANEL: CPT

## 2021-12-04 PROCEDURE — 80053 COMPREHEN METABOLIC PANEL: CPT

## 2021-12-04 PROCEDURE — 84443 ASSAY THYROID STIM HORMONE: CPT

## 2021-12-04 PROCEDURE — 82306 VITAMIN D 25 HYDROXY: CPT

## 2021-12-04 PROCEDURE — 82043 UR ALBUMIN QUANTITATIVE: CPT

## 2021-12-04 PROCEDURE — 82570 ASSAY OF URINE CREATININE: CPT

## 2021-12-06 NOTE — PROGRESS NOTES
Test for diabetes-hemoglobin A1c remains slightly elevated at 8. 5. Please advised to monitor the blood sugars before breakfast and before dinner, have this written down initiated paper and see me in about 2 to 3 weeks. Cholesterol panel looks normal.The thyr

## 2021-12-08 NOTE — TELEPHONE ENCOUNTER
Current Outpatient Medications:   •  PEG 3350-KCl-Na Bicarb-NaCl 420 g Oral Recon Soln, As directed by GI clinic., Disp: 1 each, Rfl: 0    Pharmacy comments;   Alternative requested; on backorder

## 2021-12-08 NOTE — TELEPHONE ENCOUNTER
Dr. Betsey Jackson,    Per pharmacy ordered prep on backorder. New orders pended, please sign if appropriate. Thank you.

## 2021-12-10 NOTE — TELEPHONE ENCOUNTER
This pt was scheduled for surgery in late summer, then disappeared. Can you find out what the status is?

## 2021-12-18 NOTE — TELEPHONE ENCOUNTER
On the day of prep for colonoscopy take half the dose of insulin-25 units in a.m., skip the nighttime dose of insulin. Skip Metformin and Jardiance on the day of prep for colonoscopy. May take all medications as usual after completion of colonoscopy.

## 2021-12-20 NOTE — TELEPHONE ENCOUNTER
Contacted patient to review medication instructions per below. Patient requested I send via [employer].

## 2021-12-27 RX ORDER — MELOXICAM 15 MG/1
TABLET ORAL
Qty: 30 TABLET | Refills: 0 | OUTPATIENT
Start: 2021-12-27

## 2021-12-27 NOTE — TELEPHONE ENCOUNTER
Spoke with patient who stated she does not need this refilled at this time. She stated she will call if she needs this refilled. Rx denied. Nothing further needed.

## 2021-12-28 ENCOUNTER — TELEPHONE (OUTPATIENT)
Dept: GASTROENTEROLOGY | Facility: CLINIC | Age: 66
End: 2021-12-28

## 2021-12-28 NOTE — TELEPHONE ENCOUNTER
Pt called to request refill:        Current Outpatient Medications:     •  PANTOPRAZOLE 40 MG Oral Tab EC, TAKE 1 TABLET BY MOUTH EVERY DAY IN THE MORNING BEFORE BREAKFAST, Disp: 90 tablet, Rfl: 1

## 2021-12-28 NOTE — TELEPHONE ENCOUNTER
Dr. Severa Abed,    I contacted patient regarding her Pantoprazole medication. It was refilled on 11/24/21 with 3 month supply. Patient states her  picked up her medication on accident as he takes the same medication and has been using her.      She states

## 2021-12-29 ENCOUNTER — OFFICE VISIT (OUTPATIENT)
Dept: INTERNAL MEDICINE CLINIC | Facility: CLINIC | Age: 66
End: 2021-12-29
Payer: MEDICARE

## 2021-12-29 VITALS
HEIGHT: 62 IN | SYSTOLIC BLOOD PRESSURE: 136 MMHG | RESPIRATION RATE: 18 BRPM | WEIGHT: 145 LBS | HEART RATE: 79 BPM | DIASTOLIC BLOOD PRESSURE: 70 MMHG | TEMPERATURE: 98 F | BODY MASS INDEX: 26.68 KG/M2

## 2021-12-29 DIAGNOSIS — N18.30 CKD STAGE 3 DUE TO TYPE 2 DIABETES MELLITUS (HCC): ICD-10-CM

## 2021-12-29 DIAGNOSIS — E11.9 DIABETES MELLITUS TYPE 2, INSULIN DEPENDENT (HCC): Primary | ICD-10-CM

## 2021-12-29 DIAGNOSIS — E11.22 CKD STAGE 3 DUE TO TYPE 2 DIABETES MELLITUS (HCC): ICD-10-CM

## 2021-12-29 DIAGNOSIS — E78.2 MIXED HYPERLIPIDEMIA: ICD-10-CM

## 2021-12-29 DIAGNOSIS — I10 ESSENTIAL HYPERTENSION: ICD-10-CM

## 2021-12-29 DIAGNOSIS — Z79.4 DIABETES MELLITUS TYPE 2, INSULIN DEPENDENT (HCC): Primary | ICD-10-CM

## 2021-12-29 PROCEDURE — 3008F BODY MASS INDEX DOCD: CPT | Performed by: INTERNAL MEDICINE

## 2021-12-29 PROCEDURE — 3078F DIAST BP <80 MM HG: CPT | Performed by: INTERNAL MEDICINE

## 2021-12-29 PROCEDURE — 99214 OFFICE O/P EST MOD 30 MIN: CPT | Performed by: INTERNAL MEDICINE

## 2021-12-29 PROCEDURE — 3075F SYST BP GE 130 - 139MM HG: CPT | Performed by: INTERNAL MEDICINE

## 2021-12-29 RX ORDER — PANTOPRAZOLE SODIUM 40 MG/1
40 TABLET, DELAYED RELEASE ORAL
Qty: 90 TABLET | Refills: 3 | Status: SHIPPED | OUTPATIENT
Start: 2021-12-29 | End: 2022-12-24

## 2021-12-29 NOTE — ASSESSMENT & PLAN NOTE
Blood sugars remain elevated, hemoglobin A1c last checked was at 8.4. Current medications on NovoLog Mix 34 units in a.m. and 30 units in the evening with Metformin 500 mg 2 tablets at lunchtime. She was started on Jardiance at her last office visit in October 2021 which she tells me she has run out of. She was advised to recheck labs and follow-up. Patient feels that the medications are too expensive and would like a cheaper alternative like glimepiride which she was on. She had significant hypoglycemic event and hence was taken off of the glimepiride. We will continue on the Jardiance at this time but will refer to endocrinology for management. Eye exam is up-to-date, no significant diabetic retinopathy noted. Kidney functions have been stable, she is on losartan for no proteinuria. Lipid panel has been stable on pravastatin. Foot exam has been normal she does have diabetic neuropathy associated tingling and numbness however no skin breakdown or calluses noted.

## 2021-12-29 NOTE — TELEPHONE ENCOUNTER
Attempted to reach patient, however she was out at doctor's visit. I spoke to patient's  Shaquille (on ALFREDO) and verified patient's . I relayed information below, which he wrote down and will share with patient.  Will call back with any additio

## 2021-12-29 NOTE — ASSESSMENT & PLAN NOTE
Lipid panel liver function test have been stable on fenofibrate and atorvastatin. She has tolerated medications well. Continue the same dose of medication recheck labs with the next blood draw in about 4 to 5 months.

## 2021-12-29 NOTE — TELEPHONE ENCOUNTER
I sent pantoprazole 40mg/day to pharmacy. If she cannot fill due to insurance issues, then she should take omeprazole OTC 20mg/day until her insurance allows her to  medicine.  THx

## 2022-01-06 ENCOUNTER — OFFICE VISIT (OUTPATIENT)
Dept: INTERNAL MEDICINE CLINIC | Facility: CLINIC | Age: 67
End: 2022-01-06
Payer: MEDICARE

## 2022-01-06 VITALS
WEIGHT: 146 LBS | SYSTOLIC BLOOD PRESSURE: 137 MMHG | DIASTOLIC BLOOD PRESSURE: 73 MMHG | HEIGHT: 62 IN | BODY MASS INDEX: 26.87 KG/M2 | HEART RATE: 77 BPM

## 2022-01-06 DIAGNOSIS — L30.9 DERMATITIS: Primary | ICD-10-CM

## 2022-01-06 PROCEDURE — 99213 OFFICE O/P EST LOW 20 MIN: CPT | Performed by: NURSE PRACTITIONER

## 2022-01-06 RX ORDER — CETIRIZINE HYDROCHLORIDE 10 MG/1
1 CAPSULE, LIQUID FILLED ORAL DAILY
Qty: 30 CAPSULE | Refills: 0 | Status: SHIPPED | OUTPATIENT
Start: 2022-01-06 | End: 2022-01-23

## 2022-01-06 NOTE — PROGRESS NOTES
HPI:    Patient ID: Trace Sanchez is a 77year old female. Itching (pt stts she has itching all over body)      HPI Itching  77year old female I am meeting for the first time and she is having itching throughout her body.   Patient has history of diabet Concerns:        Caffeine Concern: Yes          Tea, Coffee 2 cups daily; Exercise: Yes        Pt has a pacemaker: No        Reaction to local anesthetic: No         Review of Systems   Constitutional: Negative for chills, fatigue and fever.    HEN total) by mouth daily with dinner. 180 tablet 2   • amLODIPine 10 MG Oral Tab Take 1 tablet (10 mg total) by mouth daily.  90 tablet 2   • NovoLOG Mix 70/30 FlexPen 100 UNIT/ML Susp Pen-injector (Insulin Aspart Prot & Aspart 70/30) INJECT 55 UNITS SUBCUTANE supple. No tenderness. Right lower leg: No edema. Left lower leg: No edema. Lymphadenopathy:      Cervical: No cervical adenopathy. Skin:     General: Skin is warm and dry. Findings: No rash.       Comments: Entire body examined, back and 12/04/2021    HDL 42 12/04/2021    LDL 72 12/04/2021    VLDL 20 12/04/2021    [place] 95 12/04/2021    CALCNONHDL 146 (H) 07/14/2016      Lab Results   Component Value Date    TSH 3.010 12/04/2021                ASSESSMENT/PLAN:     Problem List Items Addr

## 2022-01-06 NOTE — ASSESSMENT & PLAN NOTE
Dermatitis- no visible rash or hyperpigmentation. Patient scratching extremities. Discussed allergic reaction. Denies any unusual contacts or new soaps. Patient had seen dermatology in March for similar reaction.     Plan    Cetirizine HCl (ZYRTEC ALLERGY)

## 2022-01-07 RX ORDER — HYDROXYZINE HYDROCHLORIDE 10 MG/1
TABLET, FILM COATED ORAL
Qty: 90 TABLET | Refills: 1 | Status: SHIPPED | OUTPATIENT
Start: 2022-01-07

## 2022-01-23 RX ORDER — CETIRIZINE HYDROCHLORIDE 10 MG/1
10 TABLET ORAL DAILY
Qty: 90 TABLET | Refills: 1 | Status: SHIPPED | OUTPATIENT
Start: 2022-01-23 | End: 2022-04-11

## 2022-01-23 NOTE — TELEPHONE ENCOUNTER
Refill passed per [address] protocol.     Requested Prescriptions   Pending Prescriptions Disp Refills    CETIRIZINE 10 MG Oral Tab [Pharmacy Med Name: CETIRIZINE HCL 10 MG TABLET] 30 tablet 0     Sig: TAKE 1 TABLET BY MOUTH EVERY DAY        Allergy Medication Protocol Passed - 1/22/2022 11:28 AM        Passed - Appoinment in past 12 or next 3 months              Recent Outpatient Visits              2 weeks ago Dermatitis    [address] [facility], [place], [place], APRN    Office Visit    3 weeks ago Diabetes mellitus type 2, insulin dependent [facility])    [address], [address], Sj Heart MD    Office Visit    1 month ago Gastroesophageal reflux disease without esophagitis    [address], [address] [facility], [place]    Office Visit    2 months ago Diabetes mellitus type 2, insulin dependent [facility])    [address], [address] [address], Sj Heart MD    Office Visit    2 months ago Encounter for removal of sutures    [address] [facility],[address], [address], [address] Only            Future Appointments         Provider Department Appt Notes    In 2 weeks YOUSIF, PROCEDURE [facility] GI PROCEDURE Colonoscopy @ [address]    In 1 month Elma Loza MD [address], [address] [facility] 2 mo f/u    In 1 month Jinny Costello MD [address] Endocrinology DM

## 2022-01-24 RX ORDER — LEVOTHYROXINE SODIUM 0.05 MG/1
50 TABLET ORAL
Qty: 90 TABLET | Refills: 1 | Status: SHIPPED | OUTPATIENT
Start: 2022-01-24 | End: 2022-07-18

## 2022-01-24 NOTE — TELEPHONE ENCOUNTER
Refill passed per [place] [facility] protocol.      Requested Prescriptions   Pending Prescriptions Disp Refills    LEVOTHYROXINE 50 MCG Oral Tab [Pharmacy Med Name: LEVOTHYROXINE 50 MCG TABLET] 90 tablet 1     Sig: Take 1 tablet (50 mcg total) by mouth before breakfast.        Thyroid Medication Protocol Passed - 1/24/2022 12:51 PM        Passed - TSH in past 12 months        Passed - Last TSH value is normal     Lab Results   Component Value Date    TSH 3.010 12/04/2021    THYROIDFUNC 2.16 10/17/2015                 Passed - Appointment in past 12 or next 3 months               Future Appointments         Provider Department Appt Notes    In 2 weeks [facility] PROCEDURE [place] [facility] [facility] [address] GI PROCEDURE Colonoscopy @ [address]     In 1 month Gina Waldron MD [facility], [address] 2 mo f/u    In 1 month [facility], [address] [facility] Endocrinology DM             Recent Outpatient Visits              2 weeks ago Dermatitis    [facility], [address], Audelia Ames APRN    Office Visit    3 weeks ago Diabetes mellitus type 2, insulin dependent [facility])    [facility], [address], MD Laura    Office Visit    1 month ago Gastroesophageal reflux disease without esophagitis    Dontae Licea MD    Office Visit    2 months ago Diabetes mellitus type 2, insulin dependent [facility])    [facility], [address], MD Laura    Office Visit    2 months ago Encounter for removal of sutures    [address] [facility],[address] Floor, [address], [address]  Only

## 2022-01-26 RX ORDER — MELOXICAM 15 MG/1
TABLET ORAL
Qty: 30 TABLET | Refills: 0 | Status: SHIPPED | OUTPATIENT
Start: 2022-01-26

## 2022-01-26 NOTE — TELEPHONE ENCOUNTER
Spoke to patient, states she did not request meloxicam refill. Advised patient Dr Ruth Kehr did refill meloxicam but she can refuse  at pharmacy  if desired. Advised Dr Ruth Kehr will not refill Rx again without office visit. NOV none.    Patient will

## 2022-01-26 NOTE — TELEPHONE ENCOUNTER
Medication request: Meloxicam 15 MG Oral Tab (Discontinued)  Sig:   Take 1 tablet (15 mg total) by mouth daily as needed for Pain.     LOV: 4/6/2021  NOV:none    [place]: 12/27/2021   Q-30 R-0

## 2022-02-04 ENCOUNTER — LAB REQUISITION (OUTPATIENT)
Dept: SURGERY | Age: 67
End: 2022-02-04
Payer: COMMERCIAL

## 2022-02-05 ENCOUNTER — TELEPHONE (OUTPATIENT)
Dept: GASTROENTEROLOGY | Facility: CLINIC | Age: 67
End: 2022-02-05

## 2022-02-05 LAB — SARS-COV-2 RNA RESP QL NAA+PROBE: NOT DETECTED

## 2022-02-05 RX ORDER — POLYETHYLENE GLYCOL 3350, SODIUM CHLORIDE, SODIUM BICARBONATE, POTASSIUM CHLORIDE 420; 11.2; 5.72; 1.48 G/4L; G/4L; G/4L; G/4L
4 POWDER, FOR SOLUTION ORAL ONCE
Qty: 4000 ML | Refills: 0 | Status: SHIPPED | OUTPATIENT
Start: 2022-02-05 | End: 2022-02-05

## 2022-02-05 NOTE — TELEPHONE ENCOUNTER
Contacted by the patient's daughter, patient was at the pharmacy and the prep was not available. It appears that a PEG bowel preparation/Trilyte was sent back in December but is not available now. I resent the prescription to the pharmacy as confirmed by the daughter.

## 2022-02-07 ENCOUNTER — SURGERY CENTER DOCUMENTATION (OUTPATIENT)
Dept: SURGERY | Age: 67
End: 2022-02-07

## 2022-02-07 PROCEDURE — 45385 COLONOSCOPY W/LESION REMOVAL: CPT | Performed by: INTERNAL MEDICINE

## 2022-02-10 ENCOUNTER — LAB ENCOUNTER (OUTPATIENT)
Dept: LAB | Age: 67
End: 2022-02-10
Attending: INTERNAL MEDICINE
Payer: COMMERCIAL

## 2022-02-10 DIAGNOSIS — E11.9 DIABETES MELLITUS TYPE 2, INSULIN DEPENDENT (HCC): ICD-10-CM

## 2022-02-10 DIAGNOSIS — Z79.4 DIABETES MELLITUS TYPE 2, INSULIN DEPENDENT (HCC): ICD-10-CM

## 2022-02-10 LAB
ALBUMIN SERPL-MCNC: 3.7 G/DL (ref 3.4–5)
ALBUMIN/GLOB SERPL: 1 {RATIO} (ref 1–2)
ALP LIVER SERPL-CCNC: 68 U/L
ALT SERPL-CCNC: 37 U/L
ANION GAP SERPL CALC-SCNC: 10 MMOL/L (ref 0–18)
AST SERPL-CCNC: 26 U/L (ref 15–37)
BASOPHILS # BLD AUTO: 0.09 X10(3) UL (ref 0–0.2)
BASOPHILS NFR BLD AUTO: 1.4 %
BILIRUB SERPL-MCNC: 0.3 MG/DL (ref 0.1–2)
BILIRUB UR QL: NEGATIVE
BUN BLD-MCNC: 23 MG/DL (ref 7–18)
BUN/CREAT SERPL: 23.5 (ref 10–20)
CALCIUM BLD-MCNC: 9.6 MG/DL (ref 8.5–10.1)
CHLORIDE SERPL-SCNC: 104 MMOL/L (ref 98–112)
CHOLEST SERPL-MCNC: 140 MG/DL (ref ?–200)
CLARITY UR: CLEAR
CO2 SERPL-SCNC: 26 MMOL/L (ref 21–32)
COLOR UR: YELLOW
CREAT BLD-MCNC: 0.98 MG/DL
CREAT UR-SCNC: 70 MG/DL
DEPRECATED RDW RBC AUTO: 39.5 FL (ref 35.1–46.3)
EOSINOPHIL # BLD AUTO: 0.31 X10(3) UL (ref 0–0.7)
EOSINOPHIL NFR BLD AUTO: 4.8 %
ERYTHROCYTE [DISTWIDTH] IN BLOOD BY AUTOMATED COUNT: 12.9 % (ref 11–15)
EST. AVERAGE GLUCOSE BLD GHB EST-MCNC: 174 MG/DL (ref 68–126)
FASTING PATIENT LIPID ANSWER: YES
FASTING STATUS PATIENT QL REPORTED: YES
GLOBULIN PLAS-MCNC: 3.7 G/DL (ref 2.8–4.4)
GLUCOSE BLD-MCNC: 120 MG/DL (ref 70–99)
GLUCOSE UR-MCNC: >=500 MG/DL
HBA1C MFR BLD: 7.7 % (ref ?–5.7)
HCT VFR BLD AUTO: 46.2 %
HDLC SERPL-MCNC: 48 MG/DL (ref 40–59)
HGB BLD-MCNC: 14.6 G/DL
HGB UR QL STRIP.AUTO: NEGATIVE
IMM GRANULOCYTES # BLD AUTO: 0.02 X10(3) UL (ref 0–1)
IMM GRANULOCYTES NFR BLD: 0.3 %
KETONES UR-MCNC: NEGATIVE MG/DL
LDLC SERPL CALC-MCNC: 70 MG/DL (ref ?–100)
LYMPHOCYTES # BLD AUTO: 1.81 X10(3) UL (ref 1–4)
LYMPHOCYTES NFR BLD AUTO: 28.3 %
MCH RBC QN AUTO: 26.5 PG (ref 26–34)
MCHC RBC AUTO-ENTMCNC: 31.6 G/DL (ref 31–37)
MCV RBC AUTO: 83.8 FL
MICROALBUMIN UR-MCNC: 2 MG/DL
MICROALBUMIN/CREAT 24H UR-RTO: 28.6 UG/MG (ref ?–30)
MONOCYTES # BLD AUTO: 0.59 X10(3) UL (ref 0.1–1)
MONOCYTES NFR BLD AUTO: 9.2 %
NEUTROPHILS # BLD AUTO: 3.58 X10 (3) UL (ref 1.5–7.7)
NEUTROPHILS # BLD AUTO: 3.58 X10(3) UL (ref 1.5–7.7)
NEUTROPHILS NFR BLD AUTO: 56 %
NITRITE UR QL STRIP.AUTO: NEGATIVE
NONHDLC SERPL-MCNC: 92 MG/DL (ref ?–130)
OSMOLALITY SERPL CALC.SUM OF ELEC: 295 MOSM/KG (ref 275–295)
PH UR: 6 [PH] (ref 5–8)
PLATELET # BLD AUTO: 431 10(3)UL (ref 150–450)
POTASSIUM SERPL-SCNC: 5 MMOL/L (ref 3.5–5.1)
PROT SERPL-MCNC: 7.4 G/DL (ref 6.4–8.2)
PROT UR-MCNC: NEGATIVE MG/DL
RBC # BLD AUTO: 5.51 X10(6)UL
SODIUM SERPL-SCNC: 140 MMOL/L (ref 136–145)
SP GR UR STRIP: 1.02 (ref 1–1.03)
TRIGL SERPL-MCNC: 124 MG/DL (ref 30–149)
UROBILINOGEN UR STRIP-ACNC: <2
VLDLC SERPL CALC-MCNC: 19 MG/DL (ref 0–30)
WBC # BLD AUTO: 6.4 X10(3) UL (ref 4–11)

## 2022-02-10 PROCEDURE — 36415 COLL VENOUS BLD VENIPUNCTURE: CPT

## 2022-02-10 PROCEDURE — 3051F HG A1C>EQUAL 7.0%<8.0%: CPT | Performed by: INTERNAL MEDICINE

## 2022-02-10 PROCEDURE — 3061F NEG MICROALBUMINURIA REV: CPT | Performed by: INTERNAL MEDICINE

## 2022-02-10 PROCEDURE — 82043 UR ALBUMIN QUANTITATIVE: CPT

## 2022-02-10 PROCEDURE — 85025 COMPLETE CBC W/AUTO DIFF WBC: CPT

## 2022-02-10 PROCEDURE — 82570 ASSAY OF URINE CREATININE: CPT

## 2022-02-10 PROCEDURE — 85060 BLOOD SMEAR INTERPRETATION: CPT

## 2022-02-10 PROCEDURE — 81001 URINALYSIS AUTO W/SCOPE: CPT

## 2022-02-10 PROCEDURE — 80061 LIPID PANEL: CPT

## 2022-02-10 PROCEDURE — 80053 COMPREHEN METABOLIC PANEL: CPT

## 2022-02-10 PROCEDURE — 83036 HEMOGLOBIN GLYCOSYLATED A1C: CPT

## 2022-02-11 ENCOUNTER — TELEPHONE (OUTPATIENT)
Dept: GASTROENTEROLOGY | Facility: CLINIC | Age: 67
End: 2022-02-11

## 2022-02-14 ENCOUNTER — TELEPHONE (OUTPATIENT)
Dept: GASTROENTEROLOGY | Facility: CLINIC | Age: 67
End: 2022-02-14

## 2022-02-18 NOTE — TELEPHONE ENCOUNTER
This pt's last A1C was 7.7. Please reach out to her about setting up surgery or schedule a f/u appt in office.

## 2022-02-24 NOTE — TELEPHONE ENCOUNTER
Review pended refill request as it does not fall under a protocol.     Last Rx: 12/22/18  LOV: 2 months ago Statement Selected

## 2022-02-24 NOTE — TELEPHONE ENCOUNTER
Called and spoke with patient. Patient states that she feels so much better now.  She wishes not to proceed with surgery for the right shoulder

## 2022-02-28 RX ORDER — PEN NEEDLE, DIABETIC 32GX 5/32"
NEEDLE, DISPOSABLE MISCELLANEOUS
Qty: 200 EACH | Refills: 3 | Status: SHIPPED | OUTPATIENT
Start: 2022-02-28

## 2022-02-28 NOTE — TELEPHONE ENCOUNTER
[facility].     Requested Prescriptions   Pending Prescriptions Disp Refills    BD PEN NEEDLE CARY 2ND GEN 32G X 4 MM Does not apply Misc [Pharmacy Med Name: BD CARY 2 GEN PEN NDL [id number]]  3     Sig: USE AS DIRECTED TWICE A DAY        Diabetic Supplies Protocol Passed - 2/28/2022  3:22 PM        Passed - Appointment in past 12 or next 3 months              Recent Outpatient Visits              1 month ago Dermatitis    [facility], [address], Reed Ames APRN    Office Visit    2 months ago Diabetes mellitus type 2, insulin dependent [facility])    [facility], [address], Riccardo Esteves MD    Office Visit    2 months ago Gastroesophageal reflux disease without esophagitis    [facility], [address] [facility], Fredy Sauer MD    Office Visit    4 months ago Diabetes mellitus type 2, insulin dependent [facility])    [facility], [address], Riccardo Esteves MD    Office Visit    4 months ago Encounter for removal of sutures    [address] [facility],[address], [address], [facility]    Nurse Only            Future Appointments         Provider Department Appt Notes    In 1 week Felix Her MD [facility], [facility] 2 mo f/u    In 2 weeks Kim Calderon MD [facility] Endocrinology DM

## 2022-03-09 ENCOUNTER — OFFICE VISIT (OUTPATIENT)
Dept: INTERNAL MEDICINE CLINIC | Facility: CLINIC | Age: 67
End: 2022-03-09
Payer: MEDICARE

## 2022-03-09 VITALS
BODY MASS INDEX: 26.06 KG/M2 | HEIGHT: 62 IN | SYSTOLIC BLOOD PRESSURE: 136 MMHG | DIASTOLIC BLOOD PRESSURE: 60 MMHG | WEIGHT: 141.63 LBS | TEMPERATURE: 98 F | RESPIRATION RATE: 16 BRPM | HEART RATE: 73 BPM

## 2022-03-09 DIAGNOSIS — E11.9 DIABETES MELLITUS TYPE 2, INSULIN DEPENDENT (HCC): ICD-10-CM

## 2022-03-09 DIAGNOSIS — H81.10 BENIGN PAROXYSMAL POSITIONAL VERTIGO, UNSPECIFIED LATERALITY: ICD-10-CM

## 2022-03-09 DIAGNOSIS — E78.2 MIXED HYPERLIPIDEMIA: Primary | ICD-10-CM

## 2022-03-09 DIAGNOSIS — I10 ESSENTIAL HYPERTENSION: ICD-10-CM

## 2022-03-09 DIAGNOSIS — Z79.4 DIABETES MELLITUS TYPE 2, INSULIN DEPENDENT (HCC): ICD-10-CM

## 2022-03-09 PROCEDURE — 99214 OFFICE O/P EST MOD 30 MIN: CPT | Performed by: INTERNAL MEDICINE

## 2022-03-09 NOTE — ASSESSMENT & PLAN NOTE
Lipid panel and liver function test have been stable on fenofibrate and atorvastatin. She has tolerated the medications well. Continue on the same dose of medication, recheck labs and follow-up in about the next 4 to 6 months.

## 2022-03-09 NOTE — ASSESSMENT & PLAN NOTE
T2DM, much improved. A1c down to 7.7 from 8.4. Current medications and NovoLog Mix 34 units in a.m. and 30 units in the evening, Metformin 500 mg 2 tablets at lunchtime and Jardiance 10 mg daily. She does complain of the expense involved in using this medication. She has been referred to endocrinology for management. She has an upcoming appointment in the next few weeks. Kidney function stable, no proteinuria, has been on losartan. Lipid panel has been stable on pravastatin. Eye exam is up-to-date. Foot exam has been normal though she does have problems with peripheral neuropathy. Gabapentin was discontinued due to drowsiness.

## 2022-03-14 ENCOUNTER — OFFICE VISIT (OUTPATIENT)
Dept: ENDOCRINOLOGY CLINIC | Facility: CLINIC | Age: 67
End: 2022-03-14
Payer: MEDICARE

## 2022-03-14 VITALS
HEIGHT: 62 IN | BODY MASS INDEX: 26.25 KG/M2 | WEIGHT: 142.63 LBS | DIASTOLIC BLOOD PRESSURE: 78 MMHG | SYSTOLIC BLOOD PRESSURE: 157 MMHG | HEART RATE: 80 BPM

## 2022-03-14 DIAGNOSIS — Z79.4 TYPE 2 DIABETES MELLITUS WITH HYPERGLYCEMIA, WITH LONG-TERM CURRENT USE OF INSULIN (HCC): ICD-10-CM

## 2022-03-14 DIAGNOSIS — E78.5 DYSLIPIDEMIA: Primary | ICD-10-CM

## 2022-03-14 DIAGNOSIS — E11.65 TYPE 2 DIABETES MELLITUS WITH HYPERGLYCEMIA, WITH LONG-TERM CURRENT USE OF INSULIN (HCC): ICD-10-CM

## 2022-03-14 LAB — GLUCOSE BLOOD: 202

## 2022-03-14 PROCEDURE — 82947 ASSAY GLUCOSE BLOOD QUANT: CPT | Performed by: INTERNAL MEDICINE

## 2022-03-14 PROCEDURE — 99204 OFFICE O/P NEW MOD 45 MIN: CPT | Performed by: INTERNAL MEDICINE

## 2022-03-14 PROCEDURE — 36416 COLLJ CAPILLARY BLOOD SPEC: CPT | Performed by: INTERNAL MEDICINE

## 2022-03-14 RX ORDER — DULAGLUTIDE 0.75 MG/.5ML
0.75 INJECTION, SOLUTION SUBCUTANEOUS WEEKLY
Qty: 2 ML | Refills: 0 | Status: SHIPPED | OUTPATIENT
Start: 2022-03-14

## 2022-04-05 RX ORDER — DULAGLUTIDE 0.75 MG/.5ML
0.75 INJECTION, SOLUTION SUBCUTANEOUS WEEKLY
Qty: 6 ML | Refills: 0 | Status: SHIPPED | OUTPATIENT
Start: 2022-04-05

## 2022-04-11 ENCOUNTER — OFFICE VISIT (OUTPATIENT)
Dept: INTERNAL MEDICINE CLINIC | Facility: CLINIC | Age: 67
End: 2022-04-11
Payer: MEDICARE

## 2022-04-11 VITALS
SYSTOLIC BLOOD PRESSURE: 138 MMHG | HEIGHT: 62 IN | WEIGHT: 137 LBS | BODY MASS INDEX: 25.21 KG/M2 | OXYGEN SATURATION: 100 % | DIASTOLIC BLOOD PRESSURE: 80 MMHG | HEART RATE: 76 BPM | RESPIRATION RATE: 14 BRPM

## 2022-04-11 DIAGNOSIS — E11.9 DIABETES MELLITUS TYPE 2, INSULIN DEPENDENT (HCC): ICD-10-CM

## 2022-04-11 DIAGNOSIS — I10 ESSENTIAL HYPERTENSION: Primary | ICD-10-CM

## 2022-04-11 DIAGNOSIS — E78.2 MIXED HYPERLIPIDEMIA: ICD-10-CM

## 2022-04-11 DIAGNOSIS — K21.9 GASTROESOPHAGEAL REFLUX DISEASE WITHOUT ESOPHAGITIS: ICD-10-CM

## 2022-04-11 DIAGNOSIS — Z79.4 DIABETES MELLITUS TYPE 2, INSULIN DEPENDENT (HCC): ICD-10-CM

## 2022-04-11 DIAGNOSIS — G47.33 OBSTRUCTIVE SLEEP APNEA SYNDROME: ICD-10-CM

## 2022-04-11 PROBLEM — J45.909 ASTHMATIC BRONCHITIS WITHOUT COMPLICATION: Status: RESOLVED | Noted: 2018-01-26 | Resolved: 2022-04-11

## 2022-04-11 PROBLEM — Z00.00 ROUTINE PHYSICAL EXAMINATION: Status: RESOLVED | Noted: 2021-08-20 | Resolved: 2022-04-11

## 2022-04-11 PROBLEM — H81.10 BPV (BENIGN POSITIONAL VERTIGO): Status: RESOLVED | Noted: 2022-03-09 | Resolved: 2022-04-11

## 2022-04-11 PROBLEM — Z01.419 PAP SMEAR, AS PART OF ROUTINE GYNECOLOGICAL EXAMINATION: Status: RESOLVED | Noted: 2021-08-20 | Resolved: 2022-04-11

## 2022-04-11 PROBLEM — M77.8 RIGHT SHOULDER TENDINITIS: Status: RESOLVED | Noted: 2021-02-02 | Resolved: 2022-04-11

## 2022-04-11 PROBLEM — J45.909 ASTHMATIC BRONCHITIS WITHOUT COMPLICATION (HCC): Status: RESOLVED | Noted: 2018-01-26 | Resolved: 2022-04-11

## 2022-04-11 PROCEDURE — 99214 OFFICE O/P EST MOD 30 MIN: CPT | Performed by: INTERNAL MEDICINE

## 2022-04-11 RX ORDER — INSULIN ASPART 100 [IU]/ML
34 INJECTION, SUSPENSION SUBCUTANEOUS
COMMUNITY

## 2022-05-16 RX ORDER — DULAGLUTIDE 0.75 MG/.5ML
0.75 INJECTION, SOLUTION SUBCUTANEOUS WEEKLY
Qty: 6 ML | Refills: 0 | Status: SHIPPED | OUTPATIENT
Start: 2022-05-16

## 2022-05-17 ENCOUNTER — TELEPHONE (OUTPATIENT)
Dept: INTERNAL MEDICINE CLINIC | Facility: CLINIC | Age: 67
End: 2022-05-17

## 2022-05-17 RX ORDER — MAGNESIUM OXIDE 400 MG/1
TABLET ORAL
Qty: 90 TABLET | Refills: 1 | Status: SHIPPED | OUTPATIENT
Start: 2022-05-17 | End: 2022-11-29

## 2022-05-17 NOTE — TELEPHONE ENCOUNTER
Patient is requesting labs before scheduled diabetic  follow up appt with PCP for 7/6/22. Patient is requesting the orders be placed for [facility] per insuranc. Please advise patient when order is confirmed.

## 2022-05-17 NOTE — TELEPHONE ENCOUNTER
Refill passed per [facility] protocol. Requested Prescriptions   Pending Prescriptions Disp Refills    magnesium oxide 400 MG Oral Tab 90 tablet 1     Sig: TAKE 1 TABLET BY MOUTH EVERY DAY        Gastrointestional Medication Protocol Passed - 5/17/2022  4:55 PM        Passed - Appointment in past 12 or next 3 months               Future Appointments         Provider Department Appt Notes    In 1 month Kristina Guzman MD [facility], [place] diabetis follow up. Spouse scheld appointment.  care partner policy informed    In 2 months Marina Murillo MD [address] [place] [address] Ophthalmology Diabetes mellitus type 2, insulin dependent [facility])             Recent Outpatient Visits              1 month ago Essential hypertension    [facility], Jennet Landau, MD    Office Visit    2 months ago Dyslipidemia    [facility] Endocrinology Nicho Ro MD    Office Visit    2 months ago Mixed hyperlipidemia    [facility], Elyssa Stroud MD    Office Visit    4 months ago Dermatitis    [place], [place], Reed Ames APRN    Office Visit    4 months ago Diabetes mellitus type 2, insulin dependent [facility])    [place], [place], Elyssa Stroud MD    Office Visit

## 2022-05-18 NOTE — TELEPHONE ENCOUNTER
I have reviewed her previous labs. She does not need any new labs at this time. Please inform her.   Thank you

## 2022-05-19 ENCOUNTER — TELEPHONE (OUTPATIENT)
Dept: OTOLARYNGOLOGY | Facility: CLINIC | Age: 67
End: 2022-05-19

## 2022-05-19 DIAGNOSIS — E04.2 MULTIPLE THYROID NODULES: Primary | ICD-10-CM

## 2022-05-25 RX ORDER — LOSARTAN POTASSIUM 100 MG/1
100 TABLET ORAL DAILY
Qty: 90 TABLET | Refills: 1 | Status: SHIPPED | OUTPATIENT
Start: 2022-05-25

## 2022-06-16 NOTE — TELEPHONE ENCOUNTER
Patient has not ready [employer] message. Patient contacted and notiifed to schedule US. Central scheduling number provided. She will call for follow up once US is completed.

## 2022-06-29 ENCOUNTER — HOSPITAL ENCOUNTER (OUTPATIENT)
Dept: ULTRASOUND IMAGING | Age: 67
Discharge: HOME | End: 2022-06-29
Attending: OTOLARYNGOLOGY
Payer: MEDICARE

## 2022-06-29 DIAGNOSIS — E04.2 MULTIPLE THYROID NODULES: ICD-10-CM

## 2022-06-29 PROCEDURE — 76536 US EXAM OF HEAD AND NECK: CPT | Performed by: OTOLARYNGOLOGY

## 2022-07-06 ENCOUNTER — OFFICE VISIT (OUTPATIENT)
Dept: INTERNAL MEDICINE CLINIC | Facility: CLINIC | Age: 67
End: 2022-07-06
Payer: MEDICARE

## 2022-07-06 VITALS
OXYGEN SATURATION: 100 % | HEART RATE: 78 BPM | BODY MASS INDEX: 25.76 KG/M2 | HEIGHT: 62 IN | SYSTOLIC BLOOD PRESSURE: 160 MMHG | WEIGHT: 140 LBS | DIASTOLIC BLOOD PRESSURE: 80 MMHG | RESPIRATION RATE: 14 BRPM

## 2022-07-06 DIAGNOSIS — G47.33 OBSTRUCTIVE SLEEP APNEA SYNDROME: ICD-10-CM

## 2022-07-06 DIAGNOSIS — E11.9 TYPE 2 DIABETES MELLITUS WITHOUT COMPLICATION, WITH LONG-TERM CURRENT USE OF INSULIN (HCC): Primary | ICD-10-CM

## 2022-07-06 DIAGNOSIS — I10 ESSENTIAL HYPERTENSION: ICD-10-CM

## 2022-07-06 DIAGNOSIS — M54.2 NECK PAIN: ICD-10-CM

## 2022-07-06 DIAGNOSIS — Z12.31 BREAST CANCER SCREENING BY MAMMOGRAM: ICD-10-CM

## 2022-07-06 DIAGNOSIS — Z79.4 TYPE 2 DIABETES MELLITUS WITHOUT COMPLICATION, WITH LONG-TERM CURRENT USE OF INSULIN (HCC): Primary | ICD-10-CM

## 2022-07-06 DIAGNOSIS — E78.2 MIXED HYPERLIPIDEMIA: ICD-10-CM

## 2022-07-06 DIAGNOSIS — E04.1 THYROID NODULE: ICD-10-CM

## 2022-07-06 PROCEDURE — 99214 OFFICE O/P EST MOD 30 MIN: CPT | Performed by: INTERNAL MEDICINE

## 2022-07-06 RX ORDER — LOSARTAN POTASSIUM 100 MG/1
100 TABLET ORAL DAILY
Qty: 90 TABLET | Refills: 1 | Status: SHIPPED | OUTPATIENT
Start: 2022-07-06 | End: 2022-07-11

## 2022-07-06 RX ORDER — FENOFIBRATE 134 MG/1
134 CAPSULE ORAL EVERY OTHER DAY
COMMUNITY

## 2022-07-06 RX ORDER — DULAGLUTIDE 0.75 MG/.5ML
0.75 INJECTION, SOLUTION SUBCUTANEOUS WEEKLY
Qty: 6 ML | Refills: 3 | Status: SHIPPED | OUTPATIENT
Start: 2022-07-06

## 2022-07-06 RX ORDER — HYDROXYZINE HYDROCHLORIDE 10 MG/1
10 TABLET, FILM COATED ORAL EVERY OTHER DAY
COMMUNITY

## 2022-07-11 ENCOUNTER — OFFICE VISIT (OUTPATIENT)
Dept: INTERNAL MEDICINE CLINIC | Facility: CLINIC | Age: 67
End: 2022-07-11
Payer: MEDICARE

## 2022-07-11 VITALS
BODY MASS INDEX: 25.76 KG/M2 | RESPIRATION RATE: 14 BRPM | HEART RATE: 84 BPM | WEIGHT: 140 LBS | SYSTOLIC BLOOD PRESSURE: 156 MMHG | HEIGHT: 62 IN | OXYGEN SATURATION: 100 % | DIASTOLIC BLOOD PRESSURE: 80 MMHG

## 2022-07-11 DIAGNOSIS — I10 ESSENTIAL HYPERTENSION: Primary | ICD-10-CM

## 2022-07-11 PROCEDURE — 99213 OFFICE O/P EST LOW 20 MIN: CPT | Performed by: INTERNAL MEDICINE

## 2022-07-11 RX ORDER — LOSARTAN POTASSIUM 100 MG/1
100 TABLET ORAL DAILY
Qty: 90 TABLET | Refills: 1 | Status: SHIPPED | OUTPATIENT
Start: 2022-07-11

## 2022-07-11 RX ORDER — METOPROLOL SUCCINATE 25 MG/1
25 TABLET, EXTENDED RELEASE ORAL DAILY
Qty: 90 TABLET | Refills: 2 | Status: SHIPPED | OUTPATIENT
Start: 2022-07-11 | End: 2022-10-09

## 2022-07-18 RX ORDER — LEVOTHYROXINE SODIUM 0.05 MG/1
50 TABLET ORAL
Qty: 90 TABLET | Refills: 1 | Status: SHIPPED | OUTPATIENT
Start: 2022-07-18

## 2022-07-18 NOTE — TELEPHONE ENCOUNTER
Dr. Leslie Tejeda, patient is refill for Levothyroxine.    Last office visit: 7/11/22  Last refill: 1/24/22

## 2022-07-27 ENCOUNTER — OFFICE VISIT (OUTPATIENT)
Dept: OPHTHALMOLOGY | Facility: CLINIC | Age: 67
End: 2022-07-27
Payer: MEDICARE

## 2022-07-27 DIAGNOSIS — E11.9 DIABETES MELLITUS TYPE 2 WITHOUT RETINOPATHY (HCC): Primary | ICD-10-CM

## 2022-07-27 DIAGNOSIS — H25.13 AGE-RELATED NUCLEAR CATARACT OF BOTH EYES: ICD-10-CM

## 2022-07-27 PROCEDURE — 92014 COMPRE OPH EXAM EST PT 1/>: CPT | Performed by: OPHTHALMOLOGY

## 2022-07-27 NOTE — PATIENT INSTRUCTIONS
Diabetes mellitus type 2 without retinopathy ([facility] [address]  Diabetes type II: no background of retinopathy, no signs of neovascularization noted. Discussed ocular and systemic benefits of blood sugar control. Diagnosis and treatment discussed in detail with patient. Age-related nuclear cataract of both eyes  Discussed mild cataracts in both eyes that are not affecting vision and are not surgical at this time. Copy of distance glasses RX given today.

## 2022-07-27 NOTE — ASSESSMENT & PLAN NOTE
Discussed mild cataracts in both eyes that are not affecting vision and are not surgical at this time. Copy of distance glasses RX given today.

## 2022-08-10 ENCOUNTER — OFFICE VISIT (OUTPATIENT)
Dept: INTERNAL MEDICINE CLINIC | Facility: CLINIC | Age: 67
End: 2022-08-10
Payer: MEDICARE

## 2022-08-10 ENCOUNTER — NURSE TRIAGE (OUTPATIENT)
Dept: INTERNAL MEDICINE CLINIC | Facility: CLINIC | Age: 67
End: 2022-08-10

## 2022-08-10 VITALS
DIASTOLIC BLOOD PRESSURE: 76 MMHG | SYSTOLIC BLOOD PRESSURE: 135 MMHG | WEIGHT: 145 LBS | HEART RATE: 72 BPM | BODY MASS INDEX: 26.68 KG/M2 | HEIGHT: 62 IN

## 2022-08-10 DIAGNOSIS — R22.42 LOCALIZED SWELLING OF LEFT FOOT: Primary | ICD-10-CM

## 2022-08-10 PROCEDURE — 99213 OFFICE O/P EST LOW 20 MIN: CPT | Performed by: INTERNAL MEDICINE

## 2022-08-10 RX ORDER — MELOXICAM 15 MG/1
15 TABLET ORAL DAILY PRN
Qty: 30 TABLET | Refills: 0 | Status: SHIPPED | OUTPATIENT
Start: 2022-08-10

## 2022-08-11 ENCOUNTER — HOSPITAL ENCOUNTER (OUTPATIENT)
Dept: GENERAL RADIOLOGY | Age: 67
Discharge: HOME | End: 2022-08-11
Attending: INTERNAL MEDICINE
Payer: MEDICARE

## 2022-08-11 DIAGNOSIS — R22.42 LOCALIZED SWELLING OF LEFT FOOT: ICD-10-CM

## 2022-08-11 PROCEDURE — 73630 X-RAY EXAM OF FOOT: CPT | Performed by: INTERNAL MEDICINE

## 2022-08-22 RX ORDER — AMLODIPINE BESYLATE 10 MG/1
10 TABLET ORAL DAILY
Qty: 90 TABLET | Refills: 0 | Status: SHIPPED | OUTPATIENT
Start: 2022-08-22

## 2022-08-22 NOTE — TELEPHONE ENCOUNTER
Patient needs refill of Amlodipine, sending on high as she only has 2 tabs left. Routing to provider due to high volumes and staffing.

## 2022-08-23 RX ORDER — METFORMIN HYDROCHLORIDE 500 MG/1
1000 TABLET, EXTENDED RELEASE ORAL
Qty: 180 TABLET | Refills: 1 | Status: SHIPPED | OUTPATIENT
Start: 2022-08-23

## 2022-08-23 NOTE — TELEPHONE ENCOUNTER
Please review refill failed/no protocol     Requested Prescriptions     Pending Prescriptions Disp Refills    METFORMIN  MG Oral Tablet 24 Hr [Pharmacy Med Name: METFORMIN HCL  MG TABLET] 180 tablet 2     Sig: TAKE 2 TABLETS BY MOUTH DAILY WITH DINNER         Recent Visits  Date Type Provider Dept   08/10/22 Office Visit Karel Hodges MD [facility]   07/11/22 Office Visit Brooksie Ahumada, MD [facility]   07/06/22 Office Visit Brooksie Ahumada, MD [facility]   04/11/22 Office Visit Brooksie Ahumada, MD [facility]   03/09/22 Office Visit Karime Cole MD [facility]   01/06/22 Office Visit DAVID Remy [facility]   12/29/21 Office Visit Karime Cole MD [facility]   10/28/21 Office Visit Karime Cole MD [facility]   08/20/21 Office Visit Karime Cole MD [facility]   06/09/21 Office Visit Karime Cole MD [facility]   Showing recent visits within past 540 days with a meds authorizing provider and meeting all other requirements  Future Appointments  Date Type Provider Dept   10/10/22 Appointment Brooksie Ahumada, MD [facility]   Showing future appointments within next 150 days with a meds authorizing provider and meeting all other requirements    Requested Prescriptions   Pending Prescriptions Disp Refills    METFORMIN  MG Oral Tablet 24 [address] [Pharmacy Med Name: METFORMIN HCL  MG TABLET] 180 tablet 2     Sig: TAKE 2 TABLETS BY MOUTH DAILY WITH DINNER        Diabetes Medication Protocol Failed - 8/23/2022 10:47 AM        Failed - Last A1C < 7.5 and within past 6 months     Lab Results   Component Value Date    A1C 7.7 (H) 02/10/2022               Failed - GFR in the past 12 months        Passed - In person appointment or virtual visit in the past 6 mos or appointment in next 3 mos       Recent Outpatient Visits              1 week ago Localized swelling of left foot    [address] Ruben Talley MD    Office Visit    3 weeks ago Diabetes mellitus type 2 without retinopathy [facility])    [facility] Ivis Wong MD    Office Visit    1 month ago Essential hypertension    [facility], [address] MD    Office Visit    1 month ago Type 2 diabetes mellitus without complication, with long-term current use of insulin [facility])    [facility], [address] MD    Office Visit    4 months ago Essential hypertension    Daily Oscar MD    Office Visit     Future Appointments         Provider Department Appt Notes    In 4 weeks [id number]     In 1 month Rodriguez Billings MD [facility], Francisca 3 month f/u               Passed - GFR > 50     No results found for: [facility]                  Future Appointments         Provider Department Appt Notes    In 4 weeks [id number]     In 1 month Rodriguez Billings MD [facility], [place] 3 month f/u          Recent Outpatient Visits              1 week ago Localized swelling of left foot    [facility], [address] [place], MD    Office Visit    3 weeks ago Diabetes mellitus type 2 without retinopathy [facility])    [facility] Ivis Wong MD    Office Visit    1 month ago Essential hypertension    [facility], [address] MD    Office Visit    1 month ago Type 2 diabetes mellitus without complication, with long-term current use of insulin [facility])    [facility], [facility], [address] MD    Office Visit    4 months ago Essential hypertension    Daily Oscar MD    Office Visit

## 2022-09-03 ENCOUNTER — NURSE TRIAGE (OUTPATIENT)
Dept: INTERNAL MEDICINE CLINIC | Facility: CLINIC | Age: 67
End: 2022-09-03

## 2022-09-13 RX ORDER — AMLODIPINE BESYLATE 10 MG/1
TABLET ORAL
Qty: 90 TABLET | Refills: 0 | OUTPATIENT
Start: 2022-09-13

## 2022-09-19 RX ORDER — ATORVASTATIN CALCIUM 10 MG/1
TABLET, FILM COATED ORAL
Qty: 90 TABLET | Refills: 1 | Status: SHIPPED | OUTPATIENT
Start: 2022-09-19

## 2022-09-19 NOTE — TELEPHONE ENCOUNTER
Last ordered by previous PCP  Refill passed per [address] [place] protocol.       Requested Prescriptions   Pending Prescriptions Disp Refills    ATORVASTATIN 10 MG Oral Tab [Pharmacy Med Name: ATORVASTATIN 10 MG TABLET] 90 tablet 1     Sig: TAKE 1 TABLET BY MOUTH EVERY DAY AT NIGHT        Cholesterol Medication Protocol Passed - 9/18/2022  2:39 PM        Passed - ALT in past 12 months        Passed - LDL in past 12 months        Passed - Last ALT < 80       Lab Results   Component Value Date    ALT 37 02/10/2022             Passed - Last LDL < 130     Lab Results   Component Value Date    LDL 70 02/10/2022               Passed - In person appointment or virtual visit in the past 12 mos or appointment in next 3 mos       Recent Outpatient Visits              1 month ago Localized swelling of left foot    [address] [place], [place], MD    Office Visit    1 month ago Diabetes mellitus type 2 without retinopathy [facility])    [facility] Andrew Caro MD    Office Visit    2 months ago Essential hypertension    [address] [place], MD    Office Visit    2 months ago Type 2 diabetes mellitus without complication, with long-term current use of insulin [facility])    [facility], Yu ROSS MD    Office Visit    5 months ago Essential hypertension    [address] [place], MD    Office Visit     Future Appointments         Provider Department Appt Notes    In 2 days [id number]     In 3 weeks Arjun Quiñones MD [address] 3 month f/u                      Future Appointments         Provider Department Appt Notes    In 2 days [id number]     In 3 weeks Arjun Quiñones MD [address] [place] 3 month f/u            Recent Outpatient Visits              1 month ago Localized swelling of left foot    [address], Jazzmine Pugh MD    Office Visit    1 month ago Diabetes mellitus type 2 without retinopathy [facility])    [facility] Javon Jara MD    Office Visit    2 months ago Essential hypertension    [facility], [address] Liz[address] MD    Office Visit    2 months ago Type 2 diabetes mellitus without complication, with long-term current use of insulin [facility])    [address], Lizzy Varghese MD    Office Visit    5 months ago Essential hypertension    [address], Lizzy Alvarado MD    Office Visit

## 2022-09-21 ENCOUNTER — HOSPITAL ENCOUNTER (OUTPATIENT)
Dept: MAMMOGRAPHY | Age: 67
Discharge: HOME | End: 2022-09-21
Attending: INTERNAL MEDICINE

## 2022-09-21 DIAGNOSIS — Z12.31 BREAST CANCER SCREENING BY MAMMOGRAM: ICD-10-CM

## 2022-09-21 PROCEDURE — 77067 SCR MAMMO BI INCL CAD: CPT | Performed by: INTERNAL MEDICINE

## 2022-09-21 PROCEDURE — 77063 BREAST TOMOSYNTHESIS BI: CPT | Performed by: INTERNAL MEDICINE

## 2022-09-28 ENCOUNTER — TELEPHONE (OUTPATIENT)
Dept: INTERNAL MEDICINE CLINIC | Facility: CLINIC | Age: 67
End: 2022-09-28

## 2022-09-28 DIAGNOSIS — G47.33 OBSTRUCTIVE SLEEP APNEA SYNDROME: Primary | ICD-10-CM

## 2022-09-28 NOTE — TELEPHONE ENCOUNTER
Spouse requesting an order for a new CPAP machine and supplies to [place] [phone]. Order pended. Please advise.

## 2022-09-29 LAB
CHOL/HDLC RATIO: 3 (CALC)
CHOLESTEROL, TOTAL: 143 MG/DL
HDL CHOLESTEROL: 48 MG/DL
HEMOGLOBIN A1C: 6.6 % OF TOTAL HGB
LDL-CHOLESTEROL: 70 MG/DL (CALC)
NON-HDL CHOLESTEROL: 95 MG/DL (CALC)
TRIGLYCERIDES: 175 MG/DL

## 2022-10-03 NOTE — TELEPHONE ENCOUNTER
Patient's  was informed by Lovely Villaseñor that their correct fax # us [phone]. Please refax.

## 2022-10-10 ENCOUNTER — OFFICE VISIT (OUTPATIENT)
Dept: INTERNAL MEDICINE CLINIC | Facility: CLINIC | Age: 67
End: 2022-10-10
Payer: MEDICARE

## 2022-10-10 VITALS
WEIGHT: 144 LBS | HEIGHT: 62 IN | HEART RATE: 78 BPM | DIASTOLIC BLOOD PRESSURE: 84 MMHG | BODY MASS INDEX: 26.5 KG/M2 | SYSTOLIC BLOOD PRESSURE: 132 MMHG

## 2022-10-10 DIAGNOSIS — I10 ESSENTIAL HYPERTENSION: Primary | ICD-10-CM

## 2022-10-10 DIAGNOSIS — E11.9 DIABETES MELLITUS TYPE 2 WITHOUT RETINOPATHY (HCC): ICD-10-CM

## 2022-10-10 DIAGNOSIS — E78.2 MIXED HYPERLIPIDEMIA: ICD-10-CM

## 2022-10-10 PROCEDURE — G0008 ADMIN INFLUENZA VIRUS VAC: HCPCS | Performed by: INTERNAL MEDICINE

## 2022-10-10 PROCEDURE — 90662 IIV NO PRSV INCREASED AG IM: CPT | Performed by: INTERNAL MEDICINE

## 2022-10-10 PROCEDURE — 99214 OFFICE O/P EST MOD 30 MIN: CPT | Performed by: INTERNAL MEDICINE

## 2022-10-10 RX ORDER — HYDROXYZINE HYDROCHLORIDE 10 MG/1
10 TABLET, FILM COATED ORAL EVERY OTHER DAY
Qty: 30 TABLET | Refills: 0 | Status: SHIPPED | OUTPATIENT
Start: 2022-10-10

## 2022-10-10 RX ORDER — INSULIN ASPART 100 [IU]/ML
INJECTION, SUSPENSION SUBCUTANEOUS
COMMUNITY
Start: 2022-07-25 | End: 2022-10-10

## 2022-11-09 NOTE — TELEPHONE ENCOUNTER
I mailed out colonoscopy results letter to pt  Updated health maintenance  Entered into 10 yr CLN recall  Recall colon in 10 years per.  Colon done 2/07/2022    Stuart Colbert MD  [facility] Clinical Staff  GI staff: please place recall in for colonoscopy in 10 years
Patient/surrogate refused vaccine...

## 2022-11-18 RX ORDER — BLOOD SUGAR DIAGNOSTIC
1 STRIP MISCELLANEOUS 2 TIMES DAILY
Qty: 200 STRIP | Refills: 3 | Status: SHIPPED | OUTPATIENT
Start: 2022-11-18

## 2022-11-18 NOTE — TELEPHONE ENCOUNTER
Refill passed per [place] [employer] [facility] protocol. Requested Prescriptions   Pending Prescriptions Disp Refills    Glucose Blood (ONETOUCH ULTRA) In Vitro Strip 200 strip 3     Si each by Other route 2 (two) times a day.        Diabetic Supplies Protocol Passed - 2022  3:38 PM        Passed - In person appointment or virtual visit in the past 12 mos or appointment in next 3 mos     Recent Outpatient Visits              1 month ago [facility]    Office Visit    3 months ago Localized swelling of left foot    [facility], Keaton ROSS [place], MD    Office Visit    3 months ago Diabetes mellitus type 2 without retinopathy [facility])    [facility] Anup Whitfield MD    Office Visit    4 months ago Essential hypertension    [facility]    Office Visit    4 months ago Type 2 diabetes mellitus without complication, with long-term current use of insulin [facility])    [place] [facility], Estela ROSS MD    Office Visit          Future Appointments         Provider Department Appt Notes    In 3 months Bianca Duffy MD [facility], [place] ROSS <medicare Physical                     Recent Outpatient Visits              1 month ago Essential hypertension    [facility]    Office Visit    3 months ago Localized swelling of left foot    [facility], Keaton ROSS [place], MD    Office Visit    3 months ago Diabetes mellitus type 2 without retinopathy [facility])    [facility] Anup Whitfield MD    Office Visit    4 months ago Essential hypertension    Kraig LeftEstela MD    Office Visit    4 months ago Type 2 diabetes mellitus without complication, with long-term current use of insulin [facility])    Dariel Glez MD    Office Visit            Future Appointments         Provider Department Appt Notes    In 3 months Keyshawn Avila MD [address], [address] [facility] Leigha Marro[address] <medicare Physical

## 2022-11-29 RX ORDER — AMLODIPINE BESYLATE 10 MG/1
10 TABLET ORAL DAILY
Qty: 90 TABLET | Refills: 1 | Status: SHIPPED | OUTPATIENT
Start: 2022-11-29

## 2022-11-29 RX ORDER — MAGNESIUM OXIDE 400 MG/1
TABLET ORAL
Qty: 90 TABLET | Refills: 1 | Status: SHIPPED | OUTPATIENT
Start: 2022-11-29

## 2022-11-29 NOTE — TELEPHONE ENCOUNTER
Please review. Protocol failed / No protocol. Requested Prescriptions   Pending Prescriptions Disp Refills    amLODIPine 10 MG Oral Tab 90 tablet 1     Sig: Take 1 tablet (10 mg total) by mouth daily. Hypertensive Medications Protocol Failed - 11/28/2022 11:00 AM        Failed - CMP or BMP in past 6 months     No results found for this or any previous visit (from the past 4392 hour(s)).             Passed - In person appointment in the past 12 or next 3 months     Recent Outpatient Visits              1 month ago Essential hypertension    [facility], [address], Jennet [place], MD    Office Visit    3 months ago Localized swelling of left foot    [facility], [address] Varghese [place], MD    Office Visit    4 months ago Diabetes mellitus type 2 without retinopathy [facility])    [facility] Marina Murillo MD    Office Visit    4 months ago Essential hypertension    [facility] [employer] [facility], [address], Jennet Landau, MD    Office Visit    4 months ago Type 2 diabetes mellitus without complication, with long-term current use of insulin [facility])    [facility], [address], Jennet Landau, MD    Office Visit          Future Appointments         Provider Department Appt Notes    In 3 months Kristina Guzman MD [facility], [place] <medicare Physical               Passed - Last BP reading less than 140/90     BP Readings from Last 1 Encounters:  10/10/22 : 132/84              Passed - In person appointment or virtual visit in the past 6 months     Recent Outpatient Visits              1 month ago Essential hypertension    [facility], [address], Jennet Landau, MD    Office Visit    3 months ago Localized swelling of left foot    [address] Pak [place], MD    Office Visit    4 months ago Diabetes mellitus type 2 without retinopathy [facility])    [facility] [address] [facility] Ophthalmology Svitlana Castellano MD    Office Visit    4 months ago Essential hypertension    [address], [address] Dominga Londono MD    Office Visit    4 months ago Type 2 diabetes mellitus without complication, with long-term current use of insulin [facility])    Dominga Francois MD    Office Visit          Future Appointments         Provider Department Appt Notes    In 3 months Selvin Maradiaga MD [address], [address] [place] <medicare Physical               Passed - [facility] > 50     GFR Evaluation  GFRNAA: 61 , resulted on 2/10/2022           Signed Prescriptions Disp Refills    magnesium oxide 400 MG Oral Tab 90 tablet 1     Sig: TAKE 1 TABLET BY MOUTH EVERY DAY       Gastrointestional Medication Protocol Passed - 11/28/2022 11:00 AM        Passed - In person appointment or virtual visit in the past 12 mos or appointment in next 3 mos     Recent Outpatient Visits              1 month ago Essential hypertension    [address], Dominga Varghese MD    Office Visit    3 months ago Localized swelling of left foot    [address] [place], [place], MD    Office Visit    4 months ago Diabetes mellitus type 2 without retinopathy [facility])    [facility] Svitlana Castellano MD    Office Visit    4 months ago Essential hypertension    [address], Dominga Varghese MD    Office Visit    4 months ago Type 2 diabetes mellitus without complication, with long-term current use of insulin [facility])    [facility], Dominga Varghese MD    Office Visit          Future Appointments         Provider Department Appt Notes    In 3 months Selvin Maradiaga MD [address], [place] [address] <medicare Physical                    Recent Outpatient Visits              1 month ago Essential hypertension    [facility], [address], Debbra Gaucher, MD    Office Visit    3 months ago Localized swelling of left foot    [facility], [address] [place], MD    Office Visit    4 months ago Diabetes mellitus type 2 without retinopathy [facility])    [facility] Cheryl Fraser MD    Office Visit    4 months ago Essential hypertension    [facility], [facility], [address], Debbra Gaucher, MD    Office Visit    4 months ago Type 2 diabetes mellitus without complication, with long-term current use of insulin [facility])    [facility], [address], Debbra Gaucher, MD    Office Visit            Future Appointments         Provider Department Appt Notes    In 3 months Meera Rivas MD [facility], [facility], [address] <medicare Physical

## 2022-11-29 NOTE — TELEPHONE ENCOUNTER
Refill passed per [address] [place] protocol. Requested Prescriptions   Pending Prescriptions Disp Refills    amLODIPine 10 MG Oral Tab 90 tablet 1     Sig: Take 1 tablet (10 mg total) by mouth daily. Hypertensive Medications Protocol Failed - 11/28/2022 11:00 AM        Failed - CMP or BMP in past 6 months     No results found for this or any previous visit (from the past 4392 hour(s)).             Passed - In person appointment in the past 12 or next 3 months     Recent Outpatient Visits              1 month ago Essential hypertension    [address], Kathy Varghese MD    Office Visit    3 months ago Localized swelling of left foot    [address] [place], [place], MD    Office Visit    4 months ago Diabetes mellitus type 2 without retinopathy [facility])    [facility] Tello Sultana MD    Office Visit    4 months ago Essential hypertension    [address], Kathy Varghese MD    Office Visit    4 months ago Type 2 diabetes mellitus without complication, with long-term current use of insulin [facility])    [facility], Kathy Varghese MD    Office Visit          Future Appointments         Provider Department Appt Notes    In 3 months Claudette Avila MD [address], [place] Varghese <medicare Physical               Passed - Last BP reading less than 140/90     BP Readings from Last 1 Encounters:  10/10/22 : 132/84              Passed - In person appointment or virtual visit in the past 6 months     Recent Outpatient Visits              1 month ago Essential hypertension    [address], Kathy Varghese MD    Office Visit    3 months ago Localized swelling of left foot    [address] Coelho [place], MD    Office Visit    4 months ago Diabetes mellitus type 2 without retinopathy [facility])    [place] [address] [facility]hthal[facility] Carlos Pedroza MD    Office Visit    4 months ago Essential hypertension    [address], [address] Diane Londono MD    Office Visit    4 months ago Type 2 diabetes mellitus without complication, with long-term current use of insulin [facility])    Diane Rhodes MD    Office Visit          Future Appointments         Provider Department Appt Notes    In 3 months Zana Hendrickson MD [address], [address] [place] <medicare Physical               Passed - [facility] > 50     GFR Evaluation  GFRNAA: 60 , resulted on 2/10/2022            magnesium oxide 400 MG Oral Tab 90 tablet 1     Sig: TAKE 1 TABLET BY MOUTH EVERY DAY       Gastrointestional Medication Protocol Passed - 11/28/2022 11:00 AM        Passed - In person appointment or virtual visit in the past 12 mos or appointment in next 3 mos     Recent Outpatient Visits              1 month ago Essential hypertension    [address], Diane Varghese MD    Office Visit    3 months ago Localized swelling of left foot    [address] [place], [place], MD    Office Visit    4 months ago Diabetes mellitus type 2 without retinopathy [facility])    [facility] Carlos Pedroza MD    Office Visit    4 months ago Essential hypertension    [address], [address] Diane Londono MD    Office Visit    4 months ago Type 2 diabetes mellitus without complication, with long-term current use of insulin [facility])    [address] Anda Shearer, MD    Office Visit          Future Appointments         Provider Department Appt Notes    In 3 months Zana Hendrickson MD [address], [address] <medicare Physical                    Recent Outpatient Visits              1 month ago Essential hypertension    [place] Juan M Seymour MD    Office Visit    3 months ago Localized swelling of left foot    [address], [address] Londono [place], MD    Office Visit    4 months ago Diabetes mellitus type 2 without retinopathy [facility])    [facility] Hesham Quintero MD    Office Visit    4 months ago Essential hypertension    [address], [address] Juan M Londono MD    Office Visit    4 months ago Type 2 diabetes mellitus without complication, with long-term current use of insulin [facility])    [facility], [address] Juan M [address]o MD    Office Visit            Future Appointments         Provider Department Appt Notes    In 3 months Brooksie Ahumada, MD [address], [address]o <medicare Physical

## 2022-12-03 ENCOUNTER — NURSE TRIAGE (OUTPATIENT)
Dept: INTERNAL MEDICINE CLINIC | Facility: CLINIC | Age: 67
End: 2022-12-03

## 2022-12-05 ENCOUNTER — MED REC SCAN ONLY (OUTPATIENT)
Dept: INTERNAL MEDICINE CLINIC | Facility: CLINIC | Age: 67
End: 2022-12-05

## 2022-12-05 NOTE — TELEPHONE ENCOUNTER
Per chart review, no ER/IC visit was noted in the chart using care everywhere. Called patient to check on status and she states that she did go to the ER at [facility] in [facility]. Patient states she was discharged and is feeling better. Offered follow up appt but patient declines at this time. Patient states she feels better. Instructed to call with any changes, verbalized understanding.

## 2022-12-06 NOTE — TELEPHONE ENCOUNTER
x1 pt should not use on face. Please have patient schedule appointment for further refills of any of her meds.    , ok with Cuong Velasco PA-C

## 2022-12-28 NOTE — TELEPHONE ENCOUNTER
Patient requesting status of refill states she has not taken it for the last two days because she is all out of medication.

## 2022-12-28 NOTE — TELEPHONE ENCOUNTER
Patient called back and relayed message regarding dosage. Patient stating she take 34 units in the morning and 24 units in the night. Would like a call back if more information is needed.

## 2022-12-28 NOTE — TELEPHONE ENCOUNTER
Patient contacted to get clarification on her dosing of insulin. Med list does not match the request from pharmacy.

## 2022-12-30 RX ORDER — INSULIN ASPART 100 [IU]/ML
INJECTION, SUSPENSION SUBCUTANEOUS
Qty: 100 ML | Refills: 3 | Status: SHIPPED | OUTPATIENT
Start: 2022-12-30

## 2023-01-04 ENCOUNTER — NURSE TRIAGE (OUTPATIENT)
Dept: INTERNAL MEDICINE CLINIC | Facility: CLINIC | Age: 68
End: 2023-01-04

## 2023-01-05 ENCOUNTER — OFFICE VISIT (OUTPATIENT)
Dept: INTERNAL MEDICINE CLINIC | Facility: CLINIC | Age: 68
End: 2023-01-05
Payer: MEDICARE

## 2023-01-05 VITALS
SYSTOLIC BLOOD PRESSURE: 138 MMHG | OXYGEN SATURATION: 98 % | RESPIRATION RATE: 20 BRPM | WEIGHT: 142 LBS | HEIGHT: 62 IN | HEART RATE: 68 BPM | DIASTOLIC BLOOD PRESSURE: 80 MMHG | BODY MASS INDEX: 26.13 KG/M2 | TEMPERATURE: 98 F

## 2023-01-05 DIAGNOSIS — R19.00 ABDOMINAL WALL MASS OF RIGHT FLANK: Primary | ICD-10-CM

## 2023-01-05 PROCEDURE — 99213 OFFICE O/P EST LOW 20 MIN: CPT | Performed by: PHYSICIAN ASSISTANT

## 2023-01-05 RX ORDER — METOPROLOL SUCCINATE 25 MG/1
25 TABLET, EXTENDED RELEASE ORAL DAILY
COMMUNITY
Start: 2022-10-13

## 2023-01-19 ENCOUNTER — HOSPITAL ENCOUNTER (OUTPATIENT)
Dept: ULTRASOUND IMAGING | Age: 68
Discharge: HOME | End: 2023-01-19
Attending: PHYSICIAN ASSISTANT
Payer: MEDICARE

## 2023-01-19 DIAGNOSIS — R19.00 ABDOMINAL WALL MASS OF RIGHT FLANK: ICD-10-CM

## 2023-01-19 PROCEDURE — 76705 ECHO EXAM OF ABDOMEN: CPT | Performed by: PHYSICIAN ASSISTANT

## 2023-01-23 ENCOUNTER — TELEPHONE (OUTPATIENT)
Dept: INTERNAL MEDICINE CLINIC | Facility: CLINIC | Age: 68
End: 2023-01-23

## 2023-01-23 NOTE — TELEPHONE ENCOUNTER
Ilene Irwin, please advise    Patient (name and  verified) is calling for ultrasound results. Test was completed on 23.

## 2023-01-23 NOTE — TELEPHONE ENCOUNTER
Patient stated that she is still having generalized abdominal pain. Last night could not sleep because of the pain. Took 2 advil last night and this morning feeling ok. Not having pain currently. Wanted to get results of ultrasound. Advised patient of radiologists notes and that will await doctor recommendations. Patient verbalized understanding.

## 2023-01-23 NOTE — TELEPHONE ENCOUNTER
Duplicate encounter. Pt wants her ultrasound test results. Please call her at her home phone number.

## 2023-02-16 ENCOUNTER — LAB ENCOUNTER (OUTPATIENT)
Dept: LAB | Age: 68
End: 2023-02-16
Attending: INTERNAL MEDICINE
Payer: MEDICARE

## 2023-02-16 LAB
ALBUMIN SERPL-MCNC: 3.3 G/DL (ref 3.4–5)
ALBUMIN/GLOB SERPL: 0.9 {RATIO} (ref 1–2)
ALP LIVER SERPL-CCNC: 99 U/L
ALT SERPL-CCNC: 32 U/L
ANION GAP SERPL CALC-SCNC: 7 MMOL/L (ref 0–18)
AST SERPL-CCNC: 20 U/L (ref 15–37)
BILIRUB SERPL-MCNC: 0.3 MG/DL (ref 0.1–2)
BUN BLD-MCNC: 18 MG/DL (ref 7–18)
BUN/CREAT SERPL: 17.6 (ref 10–20)
CALCIUM BLD-MCNC: 8.9 MG/DL (ref 8.5–10.1)
CHLORIDE SERPL-SCNC: 106 MMOL/L (ref 98–112)
CHOLEST SERPL-MCNC: 135 MG/DL (ref ?–200)
CO2 SERPL-SCNC: 28 MMOL/L (ref 21–32)
CREAT BLD-MCNC: 1.02 MG/DL
CREAT UR-SCNC: 25 MG/DL
DEPRECATED RDW RBC AUTO: 39.1 FL (ref 35.1–46.3)
ERYTHROCYTE [DISTWIDTH] IN BLOOD BY AUTOMATED COUNT: 13.1 % (ref 11–15)
EST. AVERAGE GLUCOSE BLD GHB EST-MCNC: 157 MG/DL (ref 68–126)
FASTING PATIENT LIPID ANSWER: YES
FASTING STATUS PATIENT QL REPORTED: YES
GFR SERPLBLD BASED ON 1.73 SQ M-ARVRAT: 60 ML/MIN/1.73M2 (ref 60–?)
GLOBULIN PLAS-MCNC: 3.7 G/DL (ref 2.8–4.4)
GLUCOSE BLD-MCNC: 150 MG/DL (ref 70–99)
HBA1C MFR BLD: 7.1 % (ref ?–5.7)
HCT VFR BLD AUTO: 40.9 %
HDLC SERPL-MCNC: 42 MG/DL (ref 40–59)
HGB BLD-MCNC: 13.2 G/DL
LDLC SERPL CALC-MCNC: 53 MG/DL (ref ?–100)
MCH RBC QN AUTO: 26.8 PG (ref 26–34)
MCHC RBC AUTO-ENTMCNC: 32.3 G/DL (ref 31–37)
MCV RBC AUTO: 83 FL
MICROALBUMIN UR-MCNC: 1.48 MG/DL
MICROALBUMIN/CREAT 24H UR-RTO: 59.2 UG/MG (ref ?–30)
NONHDLC SERPL-MCNC: 93 MG/DL (ref ?–130)
OSMOLALITY SERPL CALC.SUM OF ELEC: 297 MOSM/KG (ref 275–295)
PLATELET # BLD AUTO: 333 10(3)UL (ref 150–450)
POTASSIUM SERPL-SCNC: 4.6 MMOL/L (ref 3.5–5.1)
PROT SERPL-MCNC: 7 G/DL (ref 6.4–8.2)
RBC # BLD AUTO: 4.93 X10(6)UL
SODIUM SERPL-SCNC: 141 MMOL/L (ref 136–145)
TRIGL SERPL-MCNC: 256 MG/DL (ref 30–149)
TSI SER-ACNC: 2.64 MIU/ML (ref 0.36–3.74)
VLDLC SERPL CALC-MCNC: 37 MG/DL (ref 0–30)
WBC # BLD AUTO: 5.6 X10(3) UL (ref 4–11)

## 2023-02-16 PROCEDURE — 83036 HEMOGLOBIN GLYCOSYLATED A1C: CPT | Performed by: INTERNAL MEDICINE

## 2023-02-16 PROCEDURE — 82570 ASSAY OF URINE CREATININE: CPT | Performed by: INTERNAL MEDICINE

## 2023-02-16 PROCEDURE — 80061 LIPID PANEL: CPT | Performed by: INTERNAL MEDICINE

## 2023-02-16 PROCEDURE — 85027 COMPLETE CBC AUTOMATED: CPT | Performed by: INTERNAL MEDICINE

## 2023-02-16 PROCEDURE — 80053 COMPREHEN METABOLIC PANEL: CPT | Performed by: INTERNAL MEDICINE

## 2023-02-16 PROCEDURE — 84443 ASSAY THYROID STIM HORMONE: CPT | Performed by: INTERNAL MEDICINE

## 2023-02-16 PROCEDURE — 82043 UR ALBUMIN QUANTITATIVE: CPT | Performed by: INTERNAL MEDICINE

## 2023-02-16 PROCEDURE — 36415 COLL VENOUS BLD VENIPUNCTURE: CPT | Performed by: INTERNAL MEDICINE

## 2023-02-17 RX ORDER — HYDROXYZINE HYDROCHLORIDE 10 MG/1
TABLET, FILM COATED ORAL
Qty: 30 TABLET | Refills: 0 | Status: SHIPPED | OUTPATIENT
Start: 2023-02-17

## 2023-02-17 NOTE — TELEPHONE ENCOUNTER
Please review; protocol failed.     Requested Prescriptions   Pending Prescriptions Disp Refills    HYDROXYZINE 10 MG Oral Tab [Pharmacy Med Name: HYDROXYZINE HCL 10 MG TABLET] 30 tablet 0     Sig: TAKE 1 TABLET BY MOUTH EVERY OTHER DAY       There is no refill protocol information for this order          Future Appointments         Provider Department Appt Notes    In 3 weeks Brooksie Ahumada, MD [address], [address] [place], [place] <medicare Physical            Recent Outpatient Visits              1 month ago Abdominal wall mass of right flank    [facility], [address] [facility] [place], PA-C    Office Visit    4 months ago Essential hypertension    Juan M Laughlin MD    Office Visit    6 months ago Localized swelling of left foot    [address] Laughlin Oralia Guy, MD    Office Visit    6 months ago Diabetes mellitus type 2 without retinopathy [facility])    [address], [address], [place], Merly Ren MD    Office Visit    7 months ago Essential hypertension    Juan M Laughlin MD    Office Visit

## 2023-03-03 NOTE — ASSESSMENT & PLAN NOTE
This has been well supplemented. Continue on over-the-counter vitamin D 2000 units daily. Wartpeel Counseling:  I discussed with the patient the risks of Wartpeel including but not limited to erythema, scaling, itching, weeping, crusting, and pain.

## 2023-03-08 RX ORDER — AMLODIPINE BESYLATE 10 MG/1
10 TABLET ORAL DAILY
Qty: 90 TABLET | Refills: 3 | Status: SHIPPED
Start: 2023-03-08

## 2023-03-08 NOTE — TELEPHONE ENCOUNTER
Refill passed per [employer], [facility] protocol.       Requested Prescriptions   Pending Prescriptions Disp Refills    AMLODIPINE 10 MG Oral Tab [Pharmacy Med Name: AMLODIPINE BESYLATE 10 MG TAB] 90 tablet 1     Sig: TAKE 1 TABLET BY MOUTH EVERY DAY       Hypertensive Medications Protocol Passed - 3/7/2023  7:51 PM        Passed - In person appointment in the past 12 or next 3 months     Recent Outpatient Visits              2 months ago Abdominal wall mass of right flank    [facility], [address] [place] [address] Thalia Marroquin Darby, PA-C    Office Visit    4 months ago Essential hypertension    [address] [facility] [address], Yu, MD    Office Visit    7 months ago Localized swelling of left foot    [facility], [address] [place], [place] [address]pahoFreddie [facility], Rodercik Avilez MD    Office Visit    7 months ago Diabetes mellitus type 2 without retinopathy ([facility]    Kemi Muhammad, [address] [facility],[address] Floor, Soham Silva MD    Office Visit    8 months ago Essential hypertension    [address] [facility] [address], Yu, MD    Office Visit          Future Appointments         Provider Department Appt Notes    In 5 days Roula Jack MD [facility], [address] [facility] <medicare Physical               Passed - Last BP reading less than 140/90     BP Readings from Last 1 Encounters:  01/05/23 : 138/80              Passed - CMP or BMP in past 6 months     Recent Results (from the past 4392 hour(s))   COMP METABOLIC PANEL (14)    Collection Time: 02/16/23  7:30 AM   Result Value Ref Range    Glucose 150 (H) 70 - 99 mg/dL    Sodium 141 136 - 145 mmol/L    Potassium 4.6 3.5 - 5.1 mmol/L    Chloride 106 98 - 112 mmol/L    CO2 28.0 21.0 - 32.0 mmol/L    Anion Gap 7 0 - 18 mmol/L    BUN 18 7 - 18 mg/dL    Creatinine 1.02 0.55 - 1.02 mg/dL    BUN/CREA Ratio 17.6 10.0 - 20.0    Calcium, Total 8.9 8.5 - 10.1 mg/dL Calculated Osmolality 297 (H) 275 - 295 mOsm/kg    eGFR-Cr 60 >=60 mL/min/1.73m2    ALT 32 13 - 56 U/L    AST 20 15 - 37 U/L    Alkaline Phosphatase 99 55 - 142 U/L    Bilirubin, Total 0.3 0.1 - 2.0 mg/dL    Total Protein 7.0 6.4 - 8.2 g/dL    Albumin 3.3 (L) 3.4 - 5.0 g/dL    Globulin  3.7 2.8 - 4.4 g/dL    A/G Ratio 0.9 (L) 1.0 - 2.0    Patient Fasting for CMP? Yes      *Note: Due to a large number of results and/or encounters for the requested time period, some results have not been displayed. A complete set of results can be found in Results Review.                Passed - In person appointment or virtual visit in the past 6 months     Recent Outpatient Visits              2 months ago Abdominal wall mass of right flank    [facility], [address] [place] [address] Rina Irwin PA-C    Office Visit    4 months ago Essential hypertension    Lizeth Sommers MD    Office Visit    7 months ago Localized swelling of left foot    [facility], [address] [place], [place] [address]Alexander MD    Office Visit    7 months ago Diabetes mellitus type 2 without retinopathy [facility])    [address], [address], [place], Eliane Barreto MD    Office Visit    8 months ago Essential hypertension    Lizeth Sommers MD    Office Visit          Future Appointments         Provider Department Appt Notes    In 5 days Altaf Manrique MD [address], 148 [facility] <medicare Physical               Passed - [facility] or GFRNAA > 50     GFR Evaluation  EGFRCR: 60 , resulted on 2/16/2023                Future Appointments         Provider Department Appt Notes    In 5 days Altaf Manrique MD [address], [address] [facility] <medicare Physical            Recent Outpatient Visits              2 months ago Abdominal wall mass of right flank    [facility]    Office Visit    4 months ago Essential hypertension    Dudley Coronado MD    Office Visit    7 months ago Localized swelling of left foot    Keaton Coronado Trisha Goad, MD    Office Visit    7 months ago Diabetes mellitus type 2 without retinopathy [facility])    [address], [address], [facility] Benjamín Mathis MD    Office Visit    8 months ago Essential hypertension    Dudley Corondao MD    Office Visit

## 2023-03-10 ENCOUNTER — TELEPHONE (OUTPATIENT)
Dept: INTERNAL MEDICINE CLINIC | Facility: CLINIC | Age: 68
End: 2023-03-10

## 2023-03-10 NOTE — TELEPHONE ENCOUNTER
Informed patient there is still one refill left on medication and to call her pharmacy. States understanding.

## 2023-03-10 NOTE — TELEPHONE ENCOUNTER
Patient is requesting a refill on the following medication, she did also states that she is out of it.    metFORMIN  MG Oral Tablet 24 Hr

## 2023-03-13 ENCOUNTER — OFFICE VISIT (OUTPATIENT)
Dept: INTERNAL MEDICINE CLINIC | Facility: CLINIC | Age: 68
End: 2023-03-13

## 2023-03-13 VITALS
HEIGHT: 62 IN | RESPIRATION RATE: 14 BRPM | DIASTOLIC BLOOD PRESSURE: 72 MMHG | SYSTOLIC BLOOD PRESSURE: 120 MMHG | OXYGEN SATURATION: 95 % | HEART RATE: 82 BPM | WEIGHT: 144 LBS | BODY MASS INDEX: 26.5 KG/M2

## 2023-03-13 DIAGNOSIS — H26.9 CORTICAL CATARACT: ICD-10-CM

## 2023-03-13 DIAGNOSIS — E11.22 CKD STAGE 3 DUE TO TYPE 2 DIABETES MELLITUS (HCC): ICD-10-CM

## 2023-03-13 DIAGNOSIS — N18.30 CKD STAGE 3 DUE TO TYPE 2 DIABETES MELLITUS (HCC): ICD-10-CM

## 2023-03-13 DIAGNOSIS — E11.9 DIABETES MELLITUS TYPE 2 WITHOUT RETINOPATHY (HCC): ICD-10-CM

## 2023-03-13 DIAGNOSIS — G47.33 OBSTRUCTIVE SLEEP APNEA SYNDROME: ICD-10-CM

## 2023-03-13 DIAGNOSIS — E11.9 DIABETES MELLITUS TYPE 2, INSULIN DEPENDENT (HCC): ICD-10-CM

## 2023-03-13 DIAGNOSIS — K21.9 GASTROESOPHAGEAL REFLUX DISEASE WITHOUT ESOPHAGITIS: ICD-10-CM

## 2023-03-13 DIAGNOSIS — L30.9 DERMATITIS: ICD-10-CM

## 2023-03-13 DIAGNOSIS — H25.13 AGE-RELATED NUCLEAR CATARACT OF BOTH EYES: ICD-10-CM

## 2023-03-13 DIAGNOSIS — E04.1 THYROID NODULE: ICD-10-CM

## 2023-03-13 DIAGNOSIS — I10 ESSENTIAL HYPERTENSION: ICD-10-CM

## 2023-03-13 DIAGNOSIS — J30.1 ALLERGIC RHINITIS DUE TO POLLEN, UNSPECIFIED SEASONALITY: ICD-10-CM

## 2023-03-13 DIAGNOSIS — I70.0 ATHEROSCLEROSIS OF AORTIC ARCH (HCC): Primary | ICD-10-CM

## 2023-03-13 DIAGNOSIS — H52.4 HYPEROPIA WITH PRESBYOPIA, BILATERAL: ICD-10-CM

## 2023-03-13 DIAGNOSIS — F32.A MILD DEPRESSIVE DISORDER: ICD-10-CM

## 2023-03-13 DIAGNOSIS — E78.2 MIXED HYPERLIPIDEMIA: ICD-10-CM

## 2023-03-13 DIAGNOSIS — E11.42 DIABETIC POLYNEUROPATHY ASSOCIATED WITH TYPE 2 DIABETES MELLITUS (HCC): ICD-10-CM

## 2023-03-13 DIAGNOSIS — Z79.4 DIABETES MELLITUS TYPE 2, INSULIN DEPENDENT (HCC): ICD-10-CM

## 2023-03-13 DIAGNOSIS — G56.03 BILATERAL CARPAL TUNNEL SYNDROME: ICD-10-CM

## 2023-03-13 DIAGNOSIS — M54.12 CERVICAL RADICULOPATHY: ICD-10-CM

## 2023-03-13 DIAGNOSIS — H52.03 HYPEROPIA WITH PRESBYOPIA, BILATERAL: ICD-10-CM

## 2023-03-13 DIAGNOSIS — E55.9 VITAMIN D DEFICIENCY: ICD-10-CM

## 2023-03-13 PROBLEM — R55 SYNCOPE AND COLLAPSE: Status: RESOLVED | Noted: 2018-07-21 | Resolved: 2023-03-13

## 2023-03-28 RX ORDER — ATORVASTATIN CALCIUM 10 MG/1
10 TABLET, FILM COATED ORAL NIGHTLY
Qty: 90 TABLET | Refills: 3 | Status: SHIPPED | OUTPATIENT
Start: 2023-03-28

## 2023-03-28 NOTE — TELEPHONE ENCOUNTER
Refill passed per [employer], [facility] protocol.   Requested Prescriptions   Pending Prescriptions Disp Refills    ATORVASTATIN 10 MG Oral Tab [Pharmacy Med Name: ATORVASTATIN 10 MG TABLET] 90 tablet 1     Sig: TAKE 1 TABLET BY MOUTH EVERY DAY AT NIGHT       Cholesterol Medication Protocol Passed - 3/28/2023 10:50 AM        Passed - ALT in past 12 months        Passed - LDL in past 12 months        Passed - Last ALT < 80     Lab Results   Component Value Date    ALT 32 02/16/2023             Passed - Last LDL < 130     Lab Results   Component Value Date    LDL 53 02/16/2023             Passed - In person appointment or virtual visit in the past 12 mos or appointment in next 3 mos     Recent Outpatient Visits              2 weeks ago Atherosclerosis of aortic arch ([facility]    Nelma Eisenmenger, Molly Dill, MD    Office Visit    2 months ago Abdominal wall mass of right flank    [facility], [address] [place] [address] Chris Jean Baptiste PA-C    Office Visit    5 months ago Essential hypertension    Damaris Valadez, [facility] Kathy Londono MD    Office Visit    7 months ago Localized swelling of left foot    [facility], [address] [place] [address], Олег Blackman MD    Office Visit    8 months ago Diabetes mellitus type 2 without retinopathy [facility])    Damaris Valadez, [address] [facility],[address], Isac Martin MD    Office Visit          Future Appointments         Provider Department Appt Notes    In 5 months MD Damaris Rivas, [address] [place] [address] f/u                  Recent Outpatient Visits              2 weeks ago Atherosclerosis of aortic arch [facility])    Nelma Eisenmenger, Molly Dill, MD    Office Visit    2 months ago Abdominal wall mass of right flank    [facility], [address] [place] [address] Office Visit    5 months ago Essential hypertension    Kat Ram MD    Office Visit    7 months ago Localized swelling of left foot    Keaton Ram Evander Gray, MD    Office Visit    8 months ago Diabetes mellitus type 2 without retinopathy [facility])    [address], [address], Prema Rodríguez MD    Office Visit          Future Appointments         Provider Department Appt Notes    In 5 months Ernie Tapia MD [address], [address] [facility] f/u

## 2023-03-29 ENCOUNTER — TELEPHONE (OUTPATIENT)
Dept: INTERNAL MEDICINE CLINIC | Facility: CLINIC | Age: 68
End: 2023-03-29

## 2023-03-29 NOTE — TELEPHONE ENCOUNTER
Refills were sent yesterday    [id number] [place], [place] - [facility] [address] [phone], [phone]     Additional Information    Associated Reports   View Encounter   Priority and Order Details     Outpatient Medication Detail     Disp Refills Start End    atorvastatin 10 MG Oral Tab 90 tablet 3 3/28/2023     Sig - Route: Take 1 tablet (10 mg total) by mouth nightly. - Oral    Sent to pharmacy as:  Atorvastatin Calcium 10 MG Oral Tablet (Lipitor)    E-Prescribing Status: Receipt confirmed by pharmacy (3/28/2023 12:12 PM CDT)

## 2023-04-04 RX ORDER — PANTOPRAZOLE SODIUM 40 MG/1
40 TABLET, DELAYED RELEASE ORAL
Qty: 90 TABLET | Refills: 0 | Status: SHIPPED | OUTPATIENT
Start: 2023-04-04

## 2023-04-04 RX ORDER — METOPROLOL SUCCINATE 25 MG/1
25 TABLET, EXTENDED RELEASE ORAL DAILY
Qty: 90 TABLET | Refills: 3 | Status: SHIPPED | OUTPATIENT
Start: 2023-04-04

## 2023-04-04 NOTE — TELEPHONE ENCOUNTER
Refill passed per [employer], [facility] protocol. Rx listed as external/Pt reported. Requested Prescriptions   Pending Prescriptions Disp Refills    METOPROLOL SUCCINATE ER 25 MG Oral Tablet 24 Hr [Pharmacy Med Name: METOPROLOL SUCC ER 25 MG TAB] 90 tablet 2     Sig: Take 1 tablet (25 mg total) by mouth daily.        Hypertensive Medications Protocol Passed - 4/4/2023  7:53 AM        Passed - In person appointment in the past 12 or next 3 months     Recent Outpatient Visits              3 weeks ago Atherosclerosis of aortic arch ([facility]    Lalitha Ty MD    Office Visit    2 months ago Abdominal wall mass of right flank    [facility], [address] [place] [address] Merle Sanchez PA-C    Office Visit    5 months ago Essential hypertension    Priyank Fleming MD    Office Visit    7 months ago Localized swelling of left foot    Keaton Fleming Eugene Fire, MD    Office Visit    8 months ago Diabetes mellitus type 2 without retinopathy [facility])    Merle Vieyra, [address] [facility],[address] Floor, Ronnie Johnson MD    Office Visit          Future Appointments         Provider Department Appt Notes    In 5 months Caitlyn Mireles MD [facility], [address] [facility] 6M f/u               Passed - Last BP reading less than 140/90     BP Readings from Last 1 Encounters:  03/13/23 : 120/72              Passed - CMP or BMP in past 6 months     Recent Results (from the past 4392 hour(s))   COMP METABOLIC PANEL (14)    Collection Time: 02/16/23  7:30 AM   Result Value Ref Range    Glucose 150 (H) 70 - 99 mg/dL    Sodium 141 136 - 145 mmol/L    Potassium 4.6 3.5 - 5.1 mmol/L    Chloride 106 98 - 112 mmol/L    CO2 28.0 21.0 - 32.0 mmol/L    Anion Gap 7 0 - 18 mmol/L    BUN 18 7 - 18 mg/dL    Creatinine 1.02 0.55 - 1.02 mg/dL    BUN/CREA Ratio 17.6 10.0 - 20.0    Calcium, Total 8.9 8.5 - 10.1 mg/dL    Calculated Osmolality 297 (H) 275 - 295 mOsm/kg    eGFR-Cr 60 >=60 mL/min/1.73m2    ALT 32 13 - 56 U/L    AST 20 15 - 37 U/L    Alkaline Phosphatase 99 55 - 142 U/L    Bilirubin, Total 0.3 0.1 - 2.0 mg/dL    Total Protein 7.0 6.4 - 8.2 g/dL    Albumin 3.3 (L) 3.4 - 5.0 g/dL    Globulin  3.7 2.8 - 4.4 g/dL    A/G Ratio 0.9 (L) 1.0 - 2.0    Patient Fasting for CMP? Yes      *Note: Due to a large number of results and/or encounters for the requested time period, some results have not been displayed. A complete set of results can be found in Results Review.                Passed - In person appointment or virtual visit in the past 6 months     Recent Outpatient Visits              3 weeks ago Atherosclerosis of aortic arch ([facility]    Silverio Rodriguez MD    Office Visit    2 months ago Abdominal wall mass of right flank    [facility], [address] [place] [address] Gracie Ernandez PA-C    Office Visit    5 months ago Essential hypertension    Silverio Rodriguez MD    Office Visit    7 months ago Localized swelling of left foot    [facility], [address] [place] [address], Chemo Del Rio MD    Office Visit    8 months ago Diabetes mellitus type 2 without retinopathy [facility])    [address], [address] [facility],[address]Rd Floor, Shahrzad Dobson MD    Office Visit          Future Appointments         Provider Department Appt Notes    In 5 months Adrienne Acuña MD [address], 148 [facility] 6M f/u               Passed - [facility] or [facility] > 50     GFR Evaluation  EGFRCR: 60 , resulted on 2/16/2023               Recent Outpatient Visits              3 weeks ago Atherosclerosis of aortic arch [facility])    Silverio Rodriguez MD    Office Visit    2 months ago Abdominal wall mass of right flank    [facility]    Office Visit    5 months ago Essential hypertension    [address] [facility], Zion Storm MD    Office Visit    7 months ago Localized swelling of left foot    [facility] [facility] Floyd[facility] Hence, MD    Office Visit    8 months ago Diabetes mellitus type 2 without retinopathy [facility])    [address], [address] [facility],[address], Sidra Francis MD    Office Visit            Future Appointments         Provider Department Appt Notes    In 5 months MD Joleen Beard 14, [facility] f/u

## 2023-04-04 NOTE — TELEPHONE ENCOUNTER
Patient is requesting the following medication and mentions she does not have any remaining. Patient mentions Dr. Rich Paulino office is unable to refill the prescription and advised to contact PCP. Please advise.     Pantoprazole 40 mg

## 2023-05-02 RX ORDER — HYDROXYZINE HYDROCHLORIDE 10 MG/1
10 TABLET, FILM COATED ORAL EVERY OTHER DAY
Qty: 45 TABLET | Refills: 3 | Status: SHIPPED | OUTPATIENT
Start: 2023-05-02

## 2023-05-26 RX ORDER — DULAGLUTIDE 0.75 MG/.5ML
0.75 INJECTION, SOLUTION SUBCUTANEOUS WEEKLY
Qty: 6 ML | Refills: 3 | Status: SHIPPED | OUTPATIENT
Start: 2023-05-26

## 2023-05-26 NOTE — TELEPHONE ENCOUNTER
Refill passed per [place], [facility] protocol. Requested Prescriptions   Pending Prescriptions Disp Refills    TRULICITY 7.24 BU/2.1VW Subcutaneous Solution Pen-injector [Pharmacy Med Name: TRULICITY 3.87 SS/7.3 ML PEN]  3     Sig: Inject 0.75 mg into the skin once a week.        Diabetes Medication Protocol Passed - 5/26/2023 12:04 AM        Passed - Last A1C < 7.5 and within past 6 months     Lab Results   Component Value Date    A1C 7.1 (H) 02/16/2023               Passed - In person appointment or virtual visit in the past 6 mos or appointment in next 3 mos     Recent Outpatient Visits              2 months ago Atherosclerosis of aortic arch ([facility] [address]    Zion Pepper MD    Office Visit    4 months ago Abdominal wall mass of right flank    [facility], [address] [place] [address] Varun Drummond PA-C    Office Visit    7 months ago Essential hypertension    Zion Pepper MD    Office Visit    9 months ago Localized swelling of left foot    [facility], [address] [place], [place] [address]Chuck MD    Office Visit    10 months ago Diabetes mellitus type 2 without retinopathy [facility])    [address], [address] [facility],3Rd Floor, Sidra Francis MD    Office Visit          Future Appointments         Provider Department Appt Notes    In 1 month Brandy Shen MD [address], 148 [facility] 4 month follow up (policy informed)    In 3 months Madeline Harrell MD [address], [address], [place] Routine eye exam               Passed - EGFRCR or GFRNAA > 50     GFR Evaluation  EGFRCR: 60 , resulted on 2/16/2023            Passed - GFR in the past 12 months              Recent Outpatient Visits              2 months ago Atherosclerosis of aortic arch [facility])    [address], [address] [facility] [place] Ahumada, MD    Office Visit    4 months ago Abdominal wall mass of right flank    [facility], [facility] [place], PA-C    Office Visit    7 months ago Essential hypertension    Juan M Laughlin MD    Office Visit    9 months ago Localized swelling of left foot    Keaton Laughlin Oralia Guy, MD    Office Visit    10 months ago Diabetes mellitus type 2 without retinopathy [facility])    Drexel Boas, [address] [facility]    Office Visit             Future Appointments         Provider Department Appt Notes    In 1 month [place] Ahumada, MD Drexel Boas, [address] [facility] [place] 4 month follow up (policy informed)    In 3 months Rafe Mu, MD Drexel Boas, [address] [facility],3Rd [facility] [place] Routine eye exam

## 2023-06-01 RX ORDER — MAGNESIUM OXIDE 400 MG/1
400 TABLET ORAL DAILY
Qty: 90 TABLET | Refills: 3 | Status: SHIPPED | OUTPATIENT
Start: 2023-06-01

## 2023-06-01 NOTE — TELEPHONE ENCOUNTER
Refill passed per [address] [facility] [place] protocol.     Requested Prescriptions   Pending Prescriptions Disp Refills    MAGNESIUM OXIDE 400 MG Oral Tab [Pharmacy Med Name: MAGNESIUM OXIDE 400 MG TABLET] 90 tablet 1     Sig: TAKE 1 TABLET BY MOUTH EVERY DAY       Gastrointestional Medication Protocol Passed - 6/1/2023  2:19 PM        Passed - In person appointment or virtual visit in the past 12 mos or appointment in next 3 mos     Recent Outpatient Visits              2 months ago Atherosclerosis of aortic arch ([facility] [address]    [facility], [facility], Jennet Landau, MD    Office Visit    4 months ago Abdominal wall mass of right flank    [facility], [address] [place] [address] [place] Danny Roberson PA-C    Office Visit    7 months ago Essential hypertension    Fortino Galea, Jennet Landau, MD    Office Visit    9 months ago Localized swelling of left foot    [facility], [address] [place], [place] [address]    Office Visit    10 months ago Diabetes mellitus type 2 without retinopathy ([facility] [address] [address]    [employer], [address] [facility] [address], Latosha March MD    Office Visit          Future Appointments         Provider Department Appt Notes    In 1 month Kristina Guzman MD [employer] 4 month follow up (policy informed)    In 3 months MD [facility] [place] Routine eye exam                  Future Appointments         Provider Department Appt Notes    In 1 month Kristina Guzman MD [employer] 4 month follow up (policy informed)    In 3 months MD Edward Shepherd [place] Routine eye exam          Recent Outpatient Visits              2 months ago Atherosclerosis of aortic arch [facility])    [employer], [address] [facility] [address] jL Conde MD    Office Visit    4 months ago Abdominal wall mass of right flank    [address] [facility], Miquel [place], PA-C    Office Visit    7 months ago Essential hypertension    [address] [facility], [address] Lj Conde MD    Office Visit    9 months ago Localized swelling of left foot    [address] [facility], Rita Bartlett MD    Office Visit    10 months ago Diabetes mellitus type 2 without retinopathy [facility])    [address] [facility], Latosha March MD    Office Visit

## 2023-07-19 ENCOUNTER — OFFICE VISIT (OUTPATIENT)
Dept: INTERNAL MEDICINE CLINIC | Facility: CLINIC | Age: 68
End: 2023-07-19

## 2023-07-19 VITALS
WEIGHT: 144 LBS | SYSTOLIC BLOOD PRESSURE: 120 MMHG | BODY MASS INDEX: 26.5 KG/M2 | RESPIRATION RATE: 14 BRPM | DIASTOLIC BLOOD PRESSURE: 62 MMHG | HEART RATE: 76 BPM | OXYGEN SATURATION: 95 % | HEIGHT: 62 IN

## 2023-07-19 DIAGNOSIS — E78.2 MIXED HYPERLIPIDEMIA: ICD-10-CM

## 2023-07-19 DIAGNOSIS — Z79.4 DIABETES MELLITUS TYPE 2, INSULIN DEPENDENT (HCC): Primary | ICD-10-CM

## 2023-07-19 DIAGNOSIS — E04.1 THYROID NODULE: ICD-10-CM

## 2023-07-19 DIAGNOSIS — Z12.31 BREAST CANCER SCREENING BY MAMMOGRAM: ICD-10-CM

## 2023-07-19 DIAGNOSIS — I10 ESSENTIAL HYPERTENSION: ICD-10-CM

## 2023-07-19 DIAGNOSIS — E11.9 DIABETES MELLITUS TYPE 2, INSULIN DEPENDENT (HCC): Primary | ICD-10-CM

## 2023-07-19 PROCEDURE — 99214 OFFICE O/P EST MOD 30 MIN: CPT | Performed by: INTERNAL MEDICINE

## 2023-07-19 RX ORDER — METFORMIN HYDROCHLORIDE 500 MG/1
1000 TABLET, EXTENDED RELEASE ORAL
Qty: 180 TABLET | Refills: 1 | Status: SHIPPED | OUTPATIENT
Start: 2023-07-19

## 2023-08-30 ENCOUNTER — OFFICE VISIT (OUTPATIENT)
Dept: OPHTHALMOLOGY | Facility: CLINIC | Age: 68
End: 2023-08-30

## 2023-08-30 DIAGNOSIS — E11.9 DIABETES MELLITUS TYPE 2 WITHOUT RETINOPATHY (HCC): Primary | ICD-10-CM

## 2023-08-30 DIAGNOSIS — H25.13 AGE-RELATED NUCLEAR CATARACT OF BOTH EYES: ICD-10-CM

## 2023-08-30 PROCEDURE — 92015 DETERMINE REFRACTIVE STATE: CPT | Performed by: OPHTHALMOLOGY

## 2023-08-30 PROCEDURE — 92014 COMPRE OPH EXAM EST PT 1/>: CPT | Performed by: OPHTHALMOLOGY

## 2023-09-11 ENCOUNTER — OFFICE VISIT (OUTPATIENT)
Dept: INTERNAL MEDICINE CLINIC | Facility: CLINIC | Age: 68
End: 2023-09-11

## 2023-09-11 VITALS
SYSTOLIC BLOOD PRESSURE: 138 MMHG | HEART RATE: 92 BPM | TEMPERATURE: 99 F | HEIGHT: 62 IN | BODY MASS INDEX: 25.76 KG/M2 | DIASTOLIC BLOOD PRESSURE: 88 MMHG | RESPIRATION RATE: 16 BRPM | WEIGHT: 140 LBS | OXYGEN SATURATION: 96 %

## 2023-09-11 DIAGNOSIS — K29.70 GASTRITIS WITHOUT BLEEDING, UNSPECIFIED CHRONICITY, UNSPECIFIED GASTRITIS TYPE: ICD-10-CM

## 2023-09-11 DIAGNOSIS — I10 HYPERTENSION, UNSPECIFIED TYPE: ICD-10-CM

## 2023-09-11 DIAGNOSIS — R68.89 FLU-LIKE SYMPTOMS: Primary | ICD-10-CM

## 2023-09-11 PROCEDURE — 99214 OFFICE O/P EST MOD 30 MIN: CPT | Performed by: INTERNAL MEDICINE

## 2023-09-11 RX ORDER — PANTOPRAZOLE SODIUM 40 MG/1
40 TABLET, DELAYED RELEASE ORAL
Qty: 30 TABLET | Refills: 0 | Status: SHIPPED | OUTPATIENT
Start: 2023-09-11 | End: 2023-10-11

## 2023-09-11 RX ORDER — BENZONATATE 200 MG/1
200 CAPSULE ORAL 3 TIMES DAILY PRN
Qty: 30 CAPSULE | Refills: 0 | Status: SHIPPED | OUTPATIENT
Start: 2023-09-11 | End: 2023-09-21

## 2023-09-13 ENCOUNTER — TELEPHONE (OUTPATIENT)
Dept: INTERNAL MEDICINE CLINIC | Facility: CLINIC | Age: 68
End: 2023-09-13

## 2023-09-13 LAB
FLUAV + FLUBV RNA SPEC NAA+PROBE: NEGATIVE
FLUAV + FLUBV RNA SPEC NAA+PROBE: NEGATIVE
RSV RNA SPEC NAA+PROBE: NEGATIVE
SARS-COV-2 RNA RESP QL NAA+PROBE: DETECTED

## 2023-10-09 ENCOUNTER — TELEPHONE (OUTPATIENT)
Dept: INTERNAL MEDICINE CLINIC | Facility: CLINIC | Age: 68
End: 2023-10-09

## 2023-10-09 RX ORDER — AZITHROMYCIN 250 MG/1
TABLET, FILM COATED ORAL
Qty: 6 TABLET | Refills: 0 | Status: SHIPPED | OUTPATIENT
Start: 2023-10-09 | End: 2023-10-13

## 2023-10-09 NOTE — TELEPHONE ENCOUNTER
Patient reports that she has had cough sore throat, runny nose, and headache for about a week. She denies any other symptoms at this time. No audible dyspnea heard on call-patient able to speak full sentences with no issues. Patient is requesting prescription for antibiotics and cough medicine. She was advised that appointment is required for evaluation. Patient was offered appointments with other providers since Dr. Deepika Benjamin is fully booked today. Patient declined. Patient also declines appointment with Dr. Deepika Benjamin tomorrow as she states that it does not fit with her schedule. Patient is requesting that message be sent to Dr. Deepika Benjamin to request prescriptions to be sent to her pharmacy.

## 2023-10-13 ENCOUNTER — HOSPITAL ENCOUNTER (OUTPATIENT)
Dept: ULTRASOUND IMAGING | Age: 68
Discharge: HOME | End: 2023-10-13
Attending: INTERNAL MEDICINE
Payer: MEDICARE

## 2023-10-13 ENCOUNTER — LAB ENCOUNTER (OUTPATIENT)
Dept: LAB | Age: 68
End: 2023-10-13
Attending: INTERNAL MEDICINE
Payer: MEDICARE

## 2023-10-13 ENCOUNTER — HOSPITAL ENCOUNTER (OUTPATIENT)
Dept: MAMMOGRAPHY | Age: 68
Discharge: HOME | End: 2023-10-13
Attending: INTERNAL MEDICINE
Payer: MEDICARE

## 2023-10-13 DIAGNOSIS — I10 HYPERTENSION, ESSENTIAL: ICD-10-CM

## 2023-10-13 DIAGNOSIS — E11.9 DIABETES MELLITUS TYPE 2, INSULIN DEPENDENT (HCC): Primary | ICD-10-CM

## 2023-10-13 DIAGNOSIS — Z12.31 BREAST CANCER SCREENING BY MAMMOGRAM: ICD-10-CM

## 2023-10-13 DIAGNOSIS — Z79.4 DIABETES MELLITUS TYPE 2, INSULIN DEPENDENT (HCC): Primary | ICD-10-CM

## 2023-10-13 DIAGNOSIS — E04.1 THYROID NODULE: ICD-10-CM

## 2023-10-13 LAB
ALBUMIN SERPL-MCNC: 3.4 G/DL (ref 3.4–5)
ALBUMIN/GLOB SERPL: 0.8 {RATIO} (ref 1–2)
ALP LIVER SERPL-CCNC: 106 U/L
ALT SERPL-CCNC: 56 U/L
ANION GAP SERPL CALC-SCNC: 8 MMOL/L (ref 0–18)
AST SERPL-CCNC: 57 U/L (ref 15–37)
BILIRUB SERPL-MCNC: 0.3 MG/DL (ref 0.1–2)
BUN BLD-MCNC: 8 MG/DL (ref 7–18)
BUN/CREAT SERPL: 9.9 (ref 10–20)
CALCIUM BLD-MCNC: 9.1 MG/DL (ref 8.5–10.1)
CHLORIDE SERPL-SCNC: 106 MMOL/L (ref 98–112)
CHOLEST SERPL-MCNC: 130 MG/DL (ref ?–200)
CO2 SERPL-SCNC: 28 MMOL/L (ref 21–32)
CREAT BLD-MCNC: 0.81 MG/DL
CREAT UR-SCNC: 219 MG/DL
DEPRECATED RDW RBC AUTO: 38.6 FL (ref 35.1–46.3)
EGFRCR SERPLBLD CKD-EPI 2021: 80 ML/MIN/1.73M2 (ref 60–?)
ERYTHROCYTE [DISTWIDTH] IN BLOOD BY AUTOMATED COUNT: 13.3 % (ref 11–15)
FASTING PATIENT LIPID ANSWER: YES
FASTING STATUS PATIENT QL REPORTED: YES
GLOBULIN PLAS-MCNC: 4.3 G/DL (ref 2.8–4.4)
GLUCOSE BLD-MCNC: 109 MG/DL (ref 70–99)
HCT VFR BLD AUTO: 43.1 %
HDLC SERPL-MCNC: 47 MG/DL (ref 40–59)
HGB BLD-MCNC: 14.1 G/DL
LDLC SERPL CALC-MCNC: 55 MG/DL (ref ?–100)
MCH RBC QN AUTO: 26.4 PG (ref 26–34)
MCHC RBC AUTO-ENTMCNC: 32.7 G/DL (ref 31–37)
MCV RBC AUTO: 80.7 FL
MICROALBUMIN UR-MCNC: 6.68 MG/DL
MICROALBUMIN/CREAT 24H UR-RTO: 30.5 UG/MG (ref ?–30)
NONHDLC SERPL-MCNC: 83 MG/DL (ref ?–130)
OSMOLALITY SERPL CALC.SUM OF ELEC: 293 MOSM/KG (ref 275–295)
PLATELET # BLD AUTO: 348 10(3)UL (ref 150–450)
POTASSIUM SERPL-SCNC: 4.1 MMOL/L (ref 3.5–5.1)
PROT SERPL-MCNC: 7.7 G/DL (ref 6.4–8.2)
RBC # BLD AUTO: 5.34 X10(6)UL
SODIUM SERPL-SCNC: 142 MMOL/L (ref 136–145)
TRIGL SERPL-MCNC: 166 MG/DL (ref 30–149)
TSI SER-ACNC: 1.09 MIU/ML (ref 0.36–3.74)
VLDLC SERPL CALC-MCNC: 24 MG/DL (ref 0–30)
WBC # BLD AUTO: 5.5 X10(3) UL (ref 4–11)

## 2023-10-13 PROCEDURE — 83036 HEMOGLOBIN GLYCOSYLATED A1C: CPT | Performed by: INTERNAL MEDICINE

## 2023-10-13 PROCEDURE — 82043 UR ALBUMIN QUANTITATIVE: CPT

## 2023-10-13 PROCEDURE — 36415 COLL VENOUS BLD VENIPUNCTURE: CPT | Performed by: INTERNAL MEDICINE

## 2023-10-13 PROCEDURE — 82570 ASSAY OF URINE CREATININE: CPT

## 2023-10-13 PROCEDURE — 85027 COMPLETE CBC AUTOMATED: CPT | Performed by: INTERNAL MEDICINE

## 2023-10-13 PROCEDURE — 80053 COMPREHEN METABOLIC PANEL: CPT | Performed by: INTERNAL MEDICINE

## 2023-10-13 PROCEDURE — 84443 ASSAY THYROID STIM HORMONE: CPT | Performed by: INTERNAL MEDICINE

## 2023-10-13 PROCEDURE — 80061 LIPID PANEL: CPT | Performed by: INTERNAL MEDICINE

## 2023-10-18 LAB — HGBA1C: 7.3 %

## 2023-11-20 ENCOUNTER — OFFICE VISIT (OUTPATIENT)
Dept: INTERNAL MEDICINE CLINIC | Facility: CLINIC | Age: 68
End: 2023-11-20

## 2023-11-20 VITALS
HEART RATE: 80 BPM | BODY MASS INDEX: 26.13 KG/M2 | OXYGEN SATURATION: 100 % | RESPIRATION RATE: 14 BRPM | SYSTOLIC BLOOD PRESSURE: 138 MMHG | DIASTOLIC BLOOD PRESSURE: 70 MMHG | WEIGHT: 142 LBS | HEIGHT: 62 IN

## 2023-11-20 DIAGNOSIS — E11.9 DIABETES MELLITUS TYPE 2, INSULIN DEPENDENT (HCC): Primary | ICD-10-CM

## 2023-11-20 DIAGNOSIS — I10 ESSENTIAL HYPERTENSION: ICD-10-CM

## 2023-11-20 DIAGNOSIS — H93.11 TINNITUS OF RIGHT EAR: ICD-10-CM

## 2023-11-20 DIAGNOSIS — Z79.4 DIABETES MELLITUS TYPE 2, INSULIN DEPENDENT (HCC): Primary | ICD-10-CM

## 2023-11-20 DIAGNOSIS — Z23 INFLUENZA VACCINE NEEDED: ICD-10-CM

## 2023-11-20 DIAGNOSIS — E78.2 MIXED HYPERLIPIDEMIA: ICD-10-CM

## 2023-11-22 ENCOUNTER — OFFICE VISIT (OUTPATIENT)
Dept: AUDIOLOGY | Facility: CLINIC | Age: 68
End: 2023-11-22

## 2023-11-22 ENCOUNTER — OFFICE VISIT (OUTPATIENT)
Dept: OTOLARYNGOLOGY | Facility: CLINIC | Age: 68
End: 2023-11-22

## 2023-11-22 DIAGNOSIS — H93.19 TINNITUS, UNSPECIFIED LATERALITY: Primary | ICD-10-CM

## 2023-11-22 DIAGNOSIS — H90.3 SENSORINEURAL HEARING LOSS, BILATERAL: Primary | ICD-10-CM

## 2023-11-22 DIAGNOSIS — H90.3 SENSORINEURAL HEARING LOSS (SNHL) OF BOTH EARS: ICD-10-CM

## 2023-11-22 PROCEDURE — 92557 COMPREHENSIVE HEARING TEST: CPT | Performed by: AUDIOLOGIST

## 2023-11-22 PROCEDURE — 92567 TYMPANOMETRY: CPT | Performed by: AUDIOLOGIST

## 2023-11-22 NOTE — PROGRESS NOTES
Adolph Sebastian is a 79year old female. Chief Complaint   Patient presents with    Ringing In Ear     Right ear has ringing. X 2 weeks. ASSESSMENT AND PLAN:   1. Tinnitus, unspecified laterality  49-year-old who presents with tinnitus. This is only in her right ear. Has been an on and off issue over the last 2 years although has worsened over the last 2 months since she had a trip to [facility]. She says that it sounds like cicadas. She can hear it more in the nighttime when it is quiet. Denies any significant hearing loss. She was seen for this issue in 2021 by Dr. Raymundo Phoenix who obtained a carotid ultrasound for possible pulsatile tinnitus that was normal.    Otologic exam normal    Her audiogram today was stable compared to 2021. There is mild high-frequency sensorineural hearing loss in both ears. Largely symmetric. Normal tympanograms. Reassured her that her hearing test was stable today compared to 2021. Discussed that her tinnitus could be related to the little bit of hearing loss that she has and that there is unfortunately not a definitive cure for it although there are things that can make it worse such as caffeine, coffee, fatigue and stress. She does not need hearing aids to help this. She does have some musculoskeletal neck strain on the right which could also be making this worse. I did offer an MRI given the unilateral tinnitus for longer than 6 months although she is comfortable observing the issue especially since her audiogram was stable compared to 2021. Consult from Dr. Donis Middleton regarding tinnitus    - Audiology Referral - [facility])    2. Sensorineural hearing loss (SNHL) of both ears        The patient indicates understanding of these issues and agrees to the plan.       EXAM:   LMP  (LMP Unknown)     Pertinent exam findings may also be noted above in assessment and plan     System Details   Skin Inspection - Normal.   Constitutional Overall appearance - Normal. Head/Face Symmetric, TMJ tenderness not present    Eyes EOMI, PERRL   Right ear:  Canal clear, TM intact, no TORI   Left ear:  Canal clear, TM intact, no TORI   Nose: Septum midline, inferior turbinates not enlarged, nasal valves without collapse    Oral cavity/Oropharynx: No lesions or masses on inspection or palpation, tonsils symmetric    Neck: Soft without LAD, thyroid not enlarged  Voice clear/ no stridor   Other:      Scopes and Procedures:             Current Outpatient Medications   Medication Sig Dispense Refill    metFORMIN  MG Oral Tablet 24 Hr Take 2 tablets (1,000 mg total) by mouth daily with dinner. 180 tablet 1    magnesium oxide 400 MG Oral Tab Take 1 tablet (400 mg total) by mouth daily. 90 tablet 3    Dulaglutide (TRULICITY) 5.64 ZC/5.0AN Subcutaneous Solution Pen-injector Inject 0.75 mg into the skin once a week. 6 mL 3    hydrOXYzine 10 MG Oral Tab Take 1 tablet (10 mg total) by mouth every other day. 45 tablet 3    losartan 100 MG Oral Tab Take 1 tablet (100 mg total) by mouth daily. 90 tablet 3    Insulin Pen Needle (BD PEN NEEDLE CARY 2ND GEN) 32G X 4 MM Does not apply Misc Use as directed twice a day 200 each 3    metoprolol succinate ER 25 MG Oral Tablet 24 Hr Take 1 tablet (25 mg total) by mouth daily. 90 tablet 3    levothyroxine 50 MCG Oral Tab Take 1 tablet (50 mcg total) by mouth before breakfast. 90 tablet 3    atorvastatin 10 MG Oral Tab Take 1 tablet (10 mg total) by mouth nightly. 90 tablet 3    amLODIPine 10 MG Oral Tab Take 1 tablet (10 mg total) by mouth daily. 90 tablet 3    NovoLOG Mix 70/30 FlexPen 100 UNIT/ML Susp Pen-injector (Insulin Aspart Prot & Aspart 70/30) INJECT 34 UNITS SUBCUTANEOUS IN THE MORNING AND 24 UNITS AT NIGHT. 100 mL 3    Glucose Blood (ONETOUCH ULTRA) In Vitro Strip 1 each by Other route 2 (two) times a day. 200 strip 3    ONETOUCH DELICA LANCETS 78D Does not apply Misc 1 each by Does not apply route 2 (two) times daily.  200 each 3      Past Medical History:   Diagnosis Date    Anxiety state     Asthmatic bronchitis without complication     BPV (benign positional vertigo) 3/9/2022    Carpal tunnel syndrome 3/14/2016    Disorder of thyroid     Diverticular disease     DUB (dysfunctional uterine bleeding)     Endometrial biopsy in     Gastritis     Helicobacter pylori infection     EGD with Biopsy in     High blood pressure     History of pregnancy , ,      in  and , [address] in     History of [address]    Hyperlipemia     Observation for suspected condition     Osteoarthritis     Pap smear, as part of routine gynecological examination 2021    Right shoulder tendinitis 2021    Routine physical examination 2021    Screen for colon cancer     repeat CLN in 10 years    Sleep apnea     Syncope and collapse 2018    Type II or unspecified type diabetes mellitus without mention of complication, not stated as uncontrolled     Visual impairment     Readers      Social History:  Social History     Socioeconomic History    Marital status:    Tobacco Use    Smoking status: Never    Smokeless tobacco: Never   Vaping Use    Vaping Use: Never used   Substance and Sexual Activity    Alcohol use: No     Alcohol/week: 0.0 standard drinks of alcohol    Drug use: No    Sexual activity: Not Currently     Partners: Male   Other Topics Concern    Caffeine Concern Yes     Comment: Tea, Coffee 2 cups daily; Exercise Yes    Pt has a pacemaker No    Reaction to local anesthetic No   Social History Narrative    The patient does not use an assistive device. .      The patient does live in a home with stairs.           Chris Ramirez MD  2023  8:43 AM

## 2024-01-03 ENCOUNTER — HOSPITAL ENCOUNTER (OUTPATIENT)
Dept: MAMMOGRAPHY | Age: 69
Discharge: HOME | End: 2024-01-03
Attending: INTERNAL MEDICINE
Payer: MEDICARE

## 2024-01-03 ENCOUNTER — HOSPITAL ENCOUNTER (OUTPATIENT)
Dept: ULTRASOUND IMAGING | Age: 69
Discharge: HOME | End: 2024-01-03
Attending: INTERNAL MEDICINE
Payer: MEDICARE

## 2024-01-03 DIAGNOSIS — E04.1 THYROID NODULE: ICD-10-CM

## 2024-01-03 PROCEDURE — 77063 BREAST TOMOSYNTHESIS BI: CPT | Performed by: INTERNAL MEDICINE

## 2024-01-03 PROCEDURE — 77067 SCR MAMMO BI INCL CAD: CPT | Performed by: INTERNAL MEDICINE

## 2024-01-03 PROCEDURE — 76536 US EXAM OF HEAD AND NECK: CPT | Performed by: INTERNAL MEDICINE

## 2024-02-09 RX ORDER — BLOOD SUGAR DIAGNOSTIC
1 STRIP MISCELLANEOUS 2 TIMES DAILY
Qty: 200 STRIP | Refills: 3 | Status: SHIPPED | OUTPATIENT
Start: 2024-02-09

## 2024-02-09 NOTE — TELEPHONE ENCOUNTER
Refill passed per [facility] protocol.    Requested Prescriptions   Pending Prescriptions Disp Refills    ONETOUCH ULTRA In Vitro Strip [Pharmacy Med Name: ONE TOUCH ULTRA BLUE TEST STRP] 200 strip 3     Si each by Other route 2 (two) times a day.       Diabetic Supplies Protocol Passed - 2024  2:05 AM        Passed - In person appointment or virtual visit in the past 12 mos or appointment in next 3 mos     Recent Outpatient Visits              [facility], [facility]    [facility]    2 months ago Tinnitus, unspecified laterality    [facility] Jose Vanegas MD    Office Visit    2 months ago Diabetes mellitus type 2, insulin dependent ([facility]    [facility] Visit    5 months ago Flu-like symptoms    [facility] Hadley Calhoun MD    Office Visit    5 months ago Diabetes mellitus type 2 without retinopathy ([facility])    [facility] Chriss Kenney MD    Office Visit          Future Appointments         Provider Department Appt Notes    In 3 weeks Hadley Calhoun MD [facility] AWV last px 3/13/23    In 6 months Chriss Kenney MD [facility] Yearly eye exam                  Recent Outpatient Visits              2 months ago Sensorineural hearing loss, bilateral    [facility] Perri Gallegos Au.D    Office Visit    2 months ago Tinnitus, unspecified laterality    [facility]    Office Visit    2 months ago Diabetes mellitus type 2, insulin dependent ([facility])    [facility]  Hadley Calhoun MD    Office Visit    5 months ago Flu-like symptoms    [facility] Hadley Calhoun MD    Office Visit    5 months ago Diabetes mellitus type 2 without retinopathy (HCC)    [facility] Chriss Kenney MD    Office Visit          Future Appointments         Provider Department Appt Notes    In 3 weeks Hadley Calhoun MD [facility] AWV last px 3/13/23    In 6 months Chriss Kenney MD [facility] Yearly eye exam

## 2024-02-26 RX ORDER — METFORMIN HYDROCHLORIDE 500 MG/1
1000 TABLET, EXTENDED RELEASE ORAL
Qty: 180 TABLET | Refills: 3 | Status: SHIPPED | OUTPATIENT
Start: 2024-02-26

## 2024-02-26 NOTE — TELEPHONE ENCOUNTER
Refill Passed Per Protocol    Requested Prescriptions   Pending Prescriptions Disp Refills    METFORMIN  MG Oral Tablet 24 Hr [Pharmacy Med Name: METFORMIN HCL  MG TABLET] 180 tablet 1     Sig: TAKE 2 TABLETS BY MOUTH DAILY WITH DINNER       Diabetes Medication Protocol Passed - 2/25/2024  6:55 AM        Passed - Last A1C < 7.5 and within past 6 months     Lab Results   Component Value Date    A1C  10/13/2023      Comment:      Sent to [facility] for testing.        A1C 7.3 (H) 10/13/2023             Passed - In person appointment or virtual visit in the past 6 mos or appointment in next 3 mos     Recent Outpatient Visits              3 months ago Sensorineural [facility] loss, bilateral    [facility]    Office Visit    3 months ago Tinnitus, unspecified laterality    [facility]egas [facility]    Office Visit    3 months ago Diabetes mellitus type 2, insulin dependent ([facility])    [facility]    [facility] Visit    5 months ago Flu-like symptoms    [facility]    Office Visit    6 months ago Diabetes mellitus type 2 without retinopathy ([facility])    [facility] Chriss Kenney MD    Office Visit          Future Appointments         Provider Department Appt Notes    In 1 week Hadley Calhoun MD [facility] AWV last px 3/13/23    In 6 months Chriss Kenney MD [facility] Yearly eye exam               Passed - Microalbumin procedure in past 12 months or taking ACE/ARB        Passed - EGFRCR or GFRNAA > 50     GFR Evaluation  EGFRCR: 80 , resulted on 10/13/2023          Passed - GFR in the past 12 months             Future Appointments         Provider  Department Appt Notes    In 1 week Hadley Calhoun MD [facility] AWV last px 3/13/23    In 6 months Chriss Kenney MD [facility] Yearly eye exam          Recent Outpatient Visits              3 months ago Sensorineural hearing loss, bilateral    [facility] Perri Gallegos Au.D    Office Visit    3 months ago Tinnitus, unspecified laterality    [facility] Jose Vanegas MD    Office Visit    3 months ago Diabetes mellitus type 2, insulin dependent ([facility])    [facility] Hadley Calhoun MD    Office Visit    5 months ago Flu-like symptoms    [facility] Hadley Calhoun MD    Office Visit    6 months ago Diabetes mellitus type 2 without retinopathy ([facility])    [facility] Chriss Kenney MD    Office Visit

## 2024-03-04 ENCOUNTER — OFFICE VISIT (OUTPATIENT)
Dept: INTERNAL MEDICINE CLINIC | Facility: CLINIC | Age: 69
End: 2024-03-04

## 2024-03-04 VITALS
OXYGEN SATURATION: 98 % | BODY MASS INDEX: 26.5 KG/M2 | DIASTOLIC BLOOD PRESSURE: 80 MMHG | HEIGHT: 62 IN | WEIGHT: 144 LBS | HEART RATE: 73 BPM | SYSTOLIC BLOOD PRESSURE: 138 MMHG

## 2024-03-04 DIAGNOSIS — F32.A MILD DEPRESSIVE DISORDER: ICD-10-CM

## 2024-03-04 DIAGNOSIS — E04.1 THYROID NODULE: ICD-10-CM

## 2024-03-04 DIAGNOSIS — E11.9 DIABETES MELLITUS TYPE 2 WITHOUT RETINOPATHY (HCC): ICD-10-CM

## 2024-03-04 DIAGNOSIS — K21.9 GASTROESOPHAGEAL REFLUX DISEASE WITHOUT ESOPHAGITIS: ICD-10-CM

## 2024-03-04 DIAGNOSIS — Z79.4 DIABETES MELLITUS TYPE 2, INSULIN DEPENDENT (HCC): Primary | ICD-10-CM

## 2024-03-04 DIAGNOSIS — H25.13 AGE-RELATED NUCLEAR CATARACT OF BOTH EYES: ICD-10-CM

## 2024-03-04 DIAGNOSIS — I10 ESSENTIAL HYPERTENSION: ICD-10-CM

## 2024-03-04 DIAGNOSIS — G56.03 BILATERAL CARPAL TUNNEL SYNDROME: ICD-10-CM

## 2024-03-04 DIAGNOSIS — I70.0 ATHEROSCLEROSIS OF AORTIC ARCH (HCC): ICD-10-CM

## 2024-03-04 DIAGNOSIS — J30.1 ALLERGIC RHINITIS DUE TO POLLEN, UNSPECIFIED SEASONALITY: ICD-10-CM

## 2024-03-04 DIAGNOSIS — M54.2 NECK PAIN: ICD-10-CM

## 2024-03-04 DIAGNOSIS — E11.9 DIABETES MELLITUS TYPE 2, INSULIN DEPENDENT (HCC): Primary | ICD-10-CM

## 2024-03-04 DIAGNOSIS — E78.2 MIXED HYPERLIPIDEMIA: ICD-10-CM

## 2024-03-04 DIAGNOSIS — E11.42 DIABETIC POLYNEUROPATHY ASSOCIATED WITH TYPE 2 DIABETES MELLITUS (HCC): ICD-10-CM

## 2024-03-04 DIAGNOSIS — G47.33 OBSTRUCTIVE SLEEP APNEA SYNDROME: ICD-10-CM

## 2024-03-04 DIAGNOSIS — E55.9 VITAMIN D DEFICIENCY: ICD-10-CM

## 2024-03-04 DIAGNOSIS — M54.12 CERVICAL RADICULOPATHY: ICD-10-CM

## 2024-03-04 PROBLEM — L30.9 DERMATITIS: Status: RESOLVED | Noted: 2022-01-06 | Resolved: 2024-03-04

## 2024-03-04 PROBLEM — E11.22 CKD STAGE 3 DUE TO TYPE 2 DIABETES MELLITUS (HCC): Status: RESOLVED | Noted: 2017-08-25 | Resolved: 2024-03-04

## 2024-03-04 PROBLEM — H26.9 CORTICAL CATARACT: Status: RESOLVED | Noted: 2017-06-15 | Resolved: 2024-03-04

## 2024-03-04 PROBLEM — N18.30 CKD STAGE 3 DUE TO TYPE 2 DIABETES MELLITUS (HCC): Status: RESOLVED | Noted: 2017-08-25 | Resolved: 2024-03-04

## 2024-03-04 RX ORDER — HYDROQUINONE 40 MG/G
CREAM TOPICAL
Qty: 28.35 G | Refills: 0 | Status: SHIPPED | OUTPATIENT
Start: 2024-03-04

## 2024-03-04 RX ORDER — MELOXICAM 15 MG/1
15 TABLET ORAL DAILY
Qty: 30 TABLET | Refills: 0 | Status: SHIPPED | OUTPATIENT
Start: 2024-03-04 | End: 2024-04-03

## 2024-03-04 RX ORDER — PANTOPRAZOLE SODIUM 40 MG/1
40 TABLET, DELAYED RELEASE ORAL
COMMUNITY
Start: 2023-12-07

## 2024-03-05 NOTE — PROGRESS NOTES
Subjective:   Charlotte Hernández is a 68 year old female who presents for a Medicare Subsequent Annual Wellness visit (Pt already had Initial Annual Wellness) and scheduled follow up of multiple significant but stable problems and acute uncomplicated problem.   68-year-old pleasant lady here for Medicare annual wellness visit and also for evaluation of neck pain.  She reports that her neck pain started few years ago but it got worse in the last few months.  She does have occasional tingling in her hands as well.  Denies any recent trauma or fall.  No abuse no depression no falls reported.    History/Other:   Fall Risk Assessment:   She has been screened for Falls and is low risk.      Cognitive Assessment:   She had a completely normal cognitive assessment - see flowsheet entries     Functional Ability/Status:   Charlotte Hernández has some abnormal functions as listed below:  She has Vision problems based on screening of functional status.       Depression Screening (PHQ-2/PHQ-9): PHQ-2 SCORE: 0  , done 3/4/2024        <5 minutes spent screening and counseling for depression    Advanced Directives:   She does NOT have a Living Will. [Do you have a living will?: No]  She does NOT have a Power of  for Health Care. [Do you have a healthcare power of ?: No]  Discussed Advance Care Planning with patient (and family/surrogate if present). Standard forms made available to patient in After Visit Summary.      Patient Active Problem List   Diagnosis    Essential hypertension    Hyperlipidemia    Vitamin D deficiency    Sleep apnea    Allergic rhinitis    Mild depressive disorder (HCC)    Cervical radiculopathy    Diabetes mellitus type 2, insulin dependent (HCC)    GERD (gastroesophageal reflux disease)    Diabetic polyneuropathy associated with type 2 diabetes mellitus (HCC)    Bilateral carpal tunnel syndrome    Atherosclerosis of aortic arch (HCC)    Diabetes mellitus type 2 without retinopathy ([facility])     Age-related nuclear cataract of both eyes    Thyroid nodule     Allergies:  She has No Known Allergies.    Current Medications:  Outpatient Medications Marked as Taking for the 3/4/24 encounter (Office Visit) with Hadley Calhoun MD   Medication Sig    pantoprazole 40 MG Oral Tab EC Take 1 tablet (40 mg total) by mouth before breakfast.    Meloxicam 15 MG Oral Tab Take 1 tablet (15 mg total) by mouth daily. With meals    Hydroquinone 4 % External Cream 2 times daily when needed    metFORMIN  MG Oral Tablet 24 Hr Take 2 tablets (1,000 mg total) by mouth daily with dinner.    Glucose Blood (ONETOUCH ULTRA) In Vitro Strip 1 each by Other route in the morning and 1 each before bedtime.    magnesium oxide 400 MG Oral Tab Take 1 tablet (400 mg total) by mouth daily.    Dulaglutide (TRULICITY) 0.75 MG/0.5ML Subcutaneous Solution Pen-injector Inject 0.75 mg into the skin once a week.    hydrOXYzine 10 MG Oral Tab Take 1 tablet (10 mg total) by mouth every other day.    losartan 100 MG Oral Tab Take 1 tablet (100 mg total) by mouth daily.    Insulin Pen Needle (BD PEN NEEDLE CARY 2ND GEN) 32G X 4 MM Does not apply Misc Use as directed twice a day    metoprolol succinate ER 25 MG Oral Tablet 24 Hr Take 1 tablet (25 mg total) by mouth daily.    levothyroxine 50 MCG Oral Tab Take 1 tablet (50 mcg total) by mouth before breakfast.    atorvastatin 10 MG Oral Tab Take 1 tablet (10 mg total) by mouth nightly.    amLODIPine 10 MG Oral Tab Take 1 tablet (10 mg total) by mouth daily.    NovoLOG Mix 70/30 FlexPen 100 UNIT/ML Susp Pen-injector (Insulin Aspart Prot & Aspart 70/30) INJECT 34 UNITS SUBCUTANEOUS IN THE MORNING AND 24 UNITS AT NIGHT.    ONETOUCH DELICA LANCETS 33G Does not apply Misc 1 each by Does not apply route 2 (two) times daily.       Medical History:  She  has a past medical history of Anxiety state, Asthmatic bronchitis without complication (HCC) (1/26/2018), BPV (benign positional vertigo) (3/9/2022), Carpal  tunnel syndrome (3/14/2016), Disorder of thyroid, Diverticular disease, DUB (dysfunctional uterine bleeding), Gastritis, Helicobacter pylori infection, High blood pressure, History of pregnancy (, , ), History of  (), Hyperlipemia, Observation for suspected condition, Osteoarthritis, Pap smear, as part of routine gynecological examination (2021), Right shoulder tendinitis (2021), Routine physical examination (2021), Screen for colon cancer (), Sleep apnea, Syncope and collapse (2018), Type II or unspecified type diabetes mellitus without mention of complication, not stated as uncontrolled (), and Visual impairment.  Surgical History:  She  has a past surgical history that includes colonoscopy & polypectomy (2010); colonoscopy (); upper gi endoscopy,biopsy ();  (, ); endometrial bx conjunct w/colposcopy (); cholecystectomy (); carpal tunnel release (Right); and adj tiss xfer lid,nos,ear <10sqcm (Left, 10/15/2021).   Family History:  Her family history includes Diabetes in her father, mother, and sister; Heart Attack (age of onset: 83) in her mother; Heart Disease in her father; Lipids in an other family member.  Social History:  She  reports that she has never smoked. She has never used smokeless tobacco. She reports that she does not drink alcohol and does not use drugs.    Tobacco:  She has never smoked tobacco.    CAGE Alcohol Screen:   CAGE screening score of 0 on 3/4/2024, showing low risk of alcohol abuse.      Patient Care Team:  Hadley Calhoun MD as PCP - General (Internal Medicine)  Lisette Bush PT as Physical Therapist (Physical Therapy)  SAVANNAH Buck MD (GASTROENTEROLOGY)    Review of Systems   Constitutional:  Negative for activity change, appetite change and fever.   HENT:  Negative for congestion and voice change.    Respiratory:  Negative for cough and shortness of breath.    Cardiovascular:  Negative for  chest pain.   Gastrointestinal:  Negative for abdominal distention, abdominal pain and vomiting.   Genitourinary:  Negative for hematuria.   Musculoskeletal:  Positive for neck pain.   Skin:  Negative for wound.   Psychiatric/Behavioral:  Negative for behavioral problems.          Objective:   Physical Exam  Constitutional:       Appearance: Normal appearance.   HENT:      Head: Normocephalic.   Eyes:      Conjunctiva/sclera: Conjunctivae normal.   Cardiovascular:      Rate and Rhythm: Normal rate and regular rhythm.      Heart sounds: Normal heart sounds. No murmur heard.  Pulmonary:      Effort: Pulmonary effort is normal.      Breath sounds: Normal breath sounds. No rhonchi or rales.   Abdominal:      General: Bowel sounds are normal.      Palpations: Abdomen is soft.      Tenderness: There is no abdominal tenderness.   Musculoskeletal:      Cervical back: Neck supple.      Right lower leg: No edema.      Left lower leg: No edema.   Skin:     General: Skin is warm and dry.   Neurological:      General: No focal deficit present.      Mental Status: She is alert and oriented to person, place, and time. Mental status is at baseline.   Psychiatric:         Mood and Affect: Mood normal.         Behavior: Behavior normal.     Neck-no erythema or warmth.  No palpable lymph nodes.      Bilateral barefoot skin diabetic exam is normal, visualized feet and the appearance is normal.  Bilateral monofilament/sensation of both feet is abnormal  Pulsation pedal pulse exam of both lower legs/feet is normal as well.       /80   Pulse 73   Ht 5' 2\" (1.575 m)   Wt 144 lb (65.3 kg)   LMP  (LMP Unknown)   SpO2 98%   BMI 26.34 kg/m²  Estimated body mass index is 26.34 kg/m² as calculated from the following:    Height as of this encounter: 5' 2\" (1.575 m).    Weight as of this encounter: 144 lb (65.3 kg).    Medicare Hearing Assessment:   Hearing Screening    Time taken: 3/4/2024 11:49 AM  Screening Method: Questionnaire  I  have a problem hearing over the telephone: No I have trouble following the conversations when two or more people are talking at the same time: No   I have trouble understanding things on the TV: No I have to strain to understand conversations: No   I have to worry about missing the telephone ring or doorbell: No I have trouble hearing conversations in a noisy background such as a crowded room or restaurant: No   I get confused about where sounds come from: No I misunderstand some words in a sentence and need to ask people to repeat themselves: No   I especially have trouble understanding the speech of women and children: No I have trouble understanding the speaker in a large room such as at a meeting or place of Catholic: No   Many people I talk to seem to mumble (or don't speak clearly): No People get annoyed because I misunderstand what they say: No   I misunderstand what others are saying and make inappropriate responses: No I avoid social activities because I cannot hear well and fear I will reply improperly: No   Family members and friends have told me they think I may have hearing loss: No                   Assessment & Plan:   Charlotte Hernández is a 68 year old female who presents for a Medicare Assessment.     1. Diabetes mellitus type 2, insulin dependent (HCC) (Primary)-A1c noted.  Foot exam completed today.  Eye exam up-to-date.  -     CBC, Platelet; No Differential  -     Comp Metabolic Panel (14)  -     Hemoglobin A1C  -     Lipid Panel  -     TSH W Reflex To Free T4  -     Microalb/Creat Ratio, Random Urine  2. Diabetic polyneuropathy associated with type 2 diabetes mellitus (HCC) foot precautions discussed.  Encouraged podiatry follow-up.  Monitor A1c.  -     CBC, Platelet; No Differential  -     Comp Metabolic Panel (14)  -     Hemoglobin A1C  -     Lipid Panel  -     TSH W Reflex To Free T4  -     Microalb/Creat Ratio, Random Urine  3. Atherosclerosis of aortic arch (HCC) continue statins  4. Diabetes  mellitus type 2 without retinopathy (HCC) continue to monitor A1c.  Surveillance ophthalmology  5. Essential hypertension monitor blood pressure  6. Mixed hyperlipidemia monitor lipid profile  7. Vitamin D deficiency continue vitamin D supplementation  8. Thyroid nodule  Overview:  Next USG 1/26  9. Cervical radiculopathy  Overview:  xr c spine 2015    CONCLUSION:  Mild bilateral neuroforaminal narrowing at C3-4 and C4-5.  Minimal progression of degenerative changes since 11/12/13.  Dictated by (CST):  Shay Park MD on 11/10/2015 at 10:22     Approved   10. Bilateral carpal tunnel syndrome stable  11. Mild depressive disorder (HCC) in full remission  12. Gastroesophageal reflux disease without esophagitis stable  13. Allergic rhinitis due to pollen, unspecified seasonality stable  14. Age-related nuclear cataract of both eyes stable  15. Obstructive sleep apnea syndrome  Overview:  On cpap    Additional evaluation apart from Medicare annual wellness visit  16. Neck pain -I have reviewed the previous imaging report from 2018 with the patient.  She does have some cervical spine disease.  Will reimage.  Will do a trial of meloxicam.  If there is no improvement, we will proceed with physical therapy.  -     XR CERVICAL SPINE (4VIEWS) (CPT=72050); Future; Expected date: 03/04/2024  -     Physical Therapy Referral - [facility]  Other orders  -     Meloxicam; Take 1 tablet (15 mg total) by mouth daily. With meals  Dispense: 30 tablet; Refill: 0  -     Hydroquinone; 2 times daily when needed  Dispense: 28.35 g; Refill: 0    The patient indicates understanding of these issues and agrees to the plan.  Reinforced healthy diet, lifestyle, and exercise.      No follow-ups on file.     Hadley Calhoun MD, 3/5/2024     Supplementary Documentation:   General Health:  In the past six months, have you lost more than 10 pounds without trying?: 2 - No  Has your appetite been poor?: No  Type of Diet: Diabetic;Low Salt  How  does the patient maintain a good energy level?: Other  How would you describe your daily physical activity?: Moderate  How would you describe your current health state?: Good  How do you maintain positive mental well-being?: Games  On a scale of 0 to 10, with 0 being no pain and 10 being severe pain, what is your pain level?: 7 - (Severe)  In the past six months, have you experienced urine leakage?: 0-No  At any time do you feel concerned for the safety/well-being of yourself and/or your children, in your home or elsewhere?: No  Have you had any immunizations at another office such as Influenza, Hepatitis B, Tetanus, or Pneumococcal?: No       Charlotte Hernández's SCREENING SCHEDULE   Tests on this list are recommended by your physician but may not be covered, or covered at this frequency, by your insurer.   Please check with your insurance carrier before scheduling to verify coverage.   PREVENTATIVE SERVICES FREQUENCY &  COVERAGE DETAILS LAST COMPLETION DATE   Diabetes Screening    Fasting Blood Sugar /  Glucose    One screening every 12 months if never tested or if previously tested but not diagnosed with pre-diabetes   One screening every 6 months if diagnosed with pre-diabetes Lab Results   Component Value Date     (H) 10/13/2023        Cardiovascular Disease Screening    Lipid Panel  Cholesterol  Lipoprotein (HDL)  Triglycerides Covered every 5 years for all Medicare beneficiaries without apparent signs or symptoms of cardiovascular disease Lab Results   Component Value Date    CHOLEST 130 10/13/2023    HDL 47 10/13/2023    LDL 55 10/13/2023    TRIG 166 (H) 10/13/2023         Electrocardiogram (EKG)   Covered if needed at Welcome to Medicare, and non-screening if indicated for medical reasons 07/23/2018      Ultrasound Screening for Abdominal Aortic Aneurysm (AAA) Covered once in a lifetime for one of the following risk factors    Men who are 65-75 years old and have ever smoked    Anyone with a family  history -     Colorectal Cancer Screening  Covered for ages 50-85; only need ONE of the following:    Colonoscopy   Covered every 10 years    Covered every 2 years if patient is at high risk or previous colonoscopy was abnormal 02/07/2022    Health Maintenance   Topic Date Due    Colorectal Cancer Screening  02/07/2032       Flexible Sigmoidoscopy   Covered every 4 years -    Fecal Occult Blood Test Covered annually -   Bone Density Screening    Bone density screening    Covered every 2 years after age 65 if diagnosed with risk of osteoporosis or estrogen deficiency.    Covered yearly for long-term glucocorticoid medication use (Steroids) Last Dexa Scan:    XR DEXA BONE DENSITOMETRY (CPT=77080) 09/05/2019      No recommendations at this time   Pap and Pelvic    Pap   Covered every 2 years for women at normal risk; Annually if at high risk 08/20/2021  No recommendations at this time    Chlamydia Annually if high risk -  No recommendations at this time   Screening Mammogram    Mammogram     Recommend annually for all female patients aged 40 and older    One baseline mammogram covered for patients aged 35-39 01/03/2024    Health Maintenance   Topic Date Due    Mammogram  01/03/2025       Immunizations    Influenza Covered once per flu season  Please get every year 11/20/2023  No recommendations at this time    Pneumococcal Each vaccine ([id number] & [id number]) covered once after 65 Prevnar 13: 08/20/2021    [id number]: 10/04/2018     Pneumococcal Vaccination(3 of 3 - PPSV23 or PCV20) due on 10/04/2023    Hepatitis B One screening covered for patients with certain risk factors   -  No recommendations at this time    Tetanus Toxoid Not covered by Medicare Part B unless medically necessary (cut with metal); may be covered with your pharmacy prescription benefits -    Tetanus, Diptheria and Pertusis TD and TDaP Not covered by Medicare Part B -  No recommendations at this time    Zoster Not covered by Medicare Part B; may  be covered with your pharmacy  prescription benefits -  No recommendations at this time     Diabetes      Hemoglobin A1C Annually; if last result is elevated, may be asked to retest more frequently.    Medicare covers every 3 months Lab Results   Component Value Date    EAG  10/13/2023      Comment:      Sent to [facility] for testing.      A1C  10/13/2023      Comment:      Sent to [facility] for testing.        A1C 7.3 (H) 10/13/2023       No recommendations at this time    Creat/alb ratio Annually Lab Results   Component Value Date    MICROALBCREA 30.5 (H) 10/13/2023       LDL Annually Lab Results   Component Value Date    LDL 55 10/13/2023       Dilated Eye Exam Annually Last Diabetic Eye Exam:  Last Dilated Eye Exam: 08/30/23  No data recorded       Annual Monitoring of Persistent Medications (ACE/ARB, digoxin diuretics, anticonvulsants)    Potassium Annually Lab Results   Component Value Date    K 4.1 10/13/2023         Creatinine   Annually Lab Results   Component Value Date    CREATSERUM 0.81 10/13/2023         BUN Annually Lab Results   Component Value Date    BUN 8 10/13/2023       Drug Serum Conc Annually No results found for: \"DIGOXIN\", \"DIG\", \"VALP\"

## 2024-03-11 ENCOUNTER — OFFICE VISIT (OUTPATIENT)
Dept: PHYSICAL THERAPY | Age: 69
End: 2024-03-11
Attending: INTERNAL MEDICINE
Payer: MEDICARE

## 2024-03-11 ENCOUNTER — TELEPHONE (OUTPATIENT)
Dept: PHYSICAL THERAPY | Facility: HOSPITAL | Age: 69
End: 2024-03-11

## 2024-03-11 DIAGNOSIS — M54.2 NECK PAIN: Primary | ICD-10-CM

## 2024-03-11 PROCEDURE — 97140 MANUAL THERAPY 1/> REGIONS: CPT

## 2024-03-11 PROCEDURE — 97161 PT EVAL LOW COMPLEX 20 MIN: CPT

## 2024-03-11 PROCEDURE — 97110 THERAPEUTIC EXERCISES: CPT

## 2024-03-11 NOTE — PROGRESS NOTES
SPINE EVALUATION:     Diagnosis:   Neck pain (M54.2)       Referring Provider: Lj  Date of Evaluation:    3/11/2024    Precautions:  None Next MD visit:   none scheduled  Date of Surgery: n/a     PATIENT SUMMARY   Charlotte Hernández is a 68 year old female who presents to therapy today with complaints of posterior neck and head pain insidious onset 3-4 weeks ago. Pain with turning head, looking up and down. Denies limited ROM, numbness or tingling. Increased pain with cough and sneeze.  Pt describes pain level current 5/10, at best 2-3/10, at worst 10/10. Worse with carrying something or using her hand. Better with pain medication.  Current functional limitations include sleeping, walking, cleaning, cooking.     Charlotte describes prior level of function indep with all ADLs. Pt goals include \"pain to go away.\"  Past medical history was reviewed with Charlotte. Significant findings include HTN, DM2, Hyperlipidemia, CKD.  Pt denies diplopia, dysarthria, dysphasia, dizziness, drop attacks, bowel/bladder changes, saddle anesthesia, and OLEKSANDR LE N/T.    ASSESSMENT  Charlotte presents to physical therapy evaluation with primary c/o neck and head pain. The results of the objective tests and measures show limited cervical ROM, mm spasms, postural dysfunction.  Functional deficits include but are not limited to household chores, sleeping.  Signs and symptoms are consistent with diagnosis of soft tissue dysfunction. Pt and PT discussed evaluation findings, pathology, POC and HEP.  Pt voiced understanding and performs HEP correctly without reported pain. Skilled Physical Therapy is medically necessary to address the above impairments and reach functional goals.     OBJECTIVE:   Observation/Posture: right handed.  Neuro Screen: intact    cervical AROM: (* denotes performed with pain)  Flexion: 38 deg  Extension: 30 deg  Sidebending: R 12 deg; L 20 deg  Rotation: R 60 deg; L 70 deg    Accessory motion: NT  Palpation: TTP and spasm of bilat  suboccipital triangle, scalenes, cervical ps, UT, levator, MT/Rhom    Strength: (* denotes performed with pain)  UE/Scapular   Shoulder Flex: R 5-/5, L 5-/5  Shoulder ABD (C5): R 5-/5, L 5-/5  Biceps (C6): R 5-/5, L 5-/5  Wrist ext (C6): R 5-/5, L 5-/5  Triceps (C7): R 5-/5, L 5-/5  Wrist Flex (C7): R 5-/5, L 5-/5  Digit Flex (C8): R NT/5, L NT/5  Thumb Ext (C8): R NT/5, L NT/5  Interossei (T1): R NT/5, L NT/5    Rhomboids: R 3+/5, L 3+/5  Mid trap: R 3+/5; L 3+/5  Lats: R NT/5, L NT/5  Low trap: R NT/5; L NT/5     Strength: R NT lbs; L NT lbs     Flexibility:   UE/Scapular   Upper Trap: R limit; L limit  Levator Scap: R limit; L limit  Pec Major: R NT; L NT  Scalenes: R limit, L limit     Special tests:   Alar ligament -  Vertebral artery - bilaterally    Gait: pt ambulates on level ground with normal mechanics.      Today’s Treatment and Response:   Pt education was provided on exam findings, treatment diagnosis, treatment plan, expectations, and prognosis. Pt was also provided recommendations for possible soreness after evaluation, modalities as needed [ice/heat], and postural corrections  Patient was instructed in and issued a HEP for: Scapular retraction, cervical retraction. Instructed in ice and heat.    Charges: PT Eval Low Complexity, TE, MM      Total Timed Treatment: 20 min     Total Treatment Time: 35 min     Based on clinical rationale and outcome measures, this evaluation involved Low Complexity decision making due to 1-2 personal factors/comorbidities, 3 body structures involved/activity limitations, and evolving symptoms including changing pain levels.  PLAN OF CARE:    Goals: (to be met in 10 visits)   - Pt indep with HEP 5 of 7 days a week.  - Full cervical ROM to allow for looking up into cabinet without c/o.  - 1/2 to full grade increase in scapular mm strength to allow for improved posture throughout session.  - Min/no TTP/spasms of cervical musculature to allow for improved  sleeping.    Frequency / Duration: Patient will be seen for 1-2 x/week or a total of 10 visits over a 90 day period. Treatment will include: Manual Therapy, Neuromuscular Re-education, Self-Care Home Management, Therapeutic Activities, Therapeutic Exercise, Home Exercise Program instruction, and Modalities to include: Electrical stimulation (unattended)    Education or treatment limitation: None  Rehab Potential:good    Neck Disability Index Score  No data recorded    Patient/Family/Caregiver was advised of these findings, precautions, and treatment options and has agreed to actively participate in planning and for this course of care.    Thank you for your referral. Please co-sign or sign and return this letter via fax as soon as possible to [phone]. If you have any questions, please contact me at Dept: [phone]    Sincerely,  Electronically signed by therapist: Thalia Taylor PT    Physician's certification required: Yes  I certify the need for these services furnished under this plan of treatment and while under my care.    X___________________________________________________ Date____________________    Certification From: 3/11/2024  To:6/9/2024

## 2024-03-19 ENCOUNTER — OFFICE VISIT (OUTPATIENT)
Dept: PHYSICAL THERAPY | Age: 69
End: 2024-03-19
Attending: INTERNAL MEDICINE
Payer: MEDICARE

## 2024-03-19 PROCEDURE — 97112 NEUROMUSCULAR REEDUCATION: CPT

## 2024-03-19 PROCEDURE — 97140 MANUAL THERAPY 1/> REGIONS: CPT

## 2024-03-19 PROCEDURE — 97110 THERAPEUTIC EXERCISES: CPT

## 2024-03-19 NOTE — PROGRESS NOTES
Diagnosis:   Neck pain (M54.2)       Referring Provider: Lj  Date of Evaluation:    3/11/24    Precautions:  None Next MD visit:   none scheduled  Date of Surgery: n/a   Insurance Primary/Secondary: MEDICARE / [employer]     # Auth Visits: 2/10            Subjective: Pt reports having increased pain to 7/10 last night, insidious onset. Pt reports taking medication and gaining some relief.    Pain: 5/10      Objective: See table for program.  Reviewed HEP issued at al: Scapular retraction, cervical retraction.      Assessment: Instructed pt in smaller/gentler range of motion with cervical retraction.      Goals:   (to be met in 10 visits)   - Pt indep with HEP 5 of 7 days a week.  - Full cervical ROM to allow for looking up into cabinet without c/o.  - 1/2 to full grade increase in scapular mm strength to allow for improved posture throughout session.  - Min/no TTP/spasms of cervical musculature to allow for improved sleeping.    Plan: Advance postural strengthening and cervical ROM as able.  Date: 3/19/2024  TX#: 2/10 Date:                 TX#: 3/ Date:                 TX#: 4/ Date:                 TX#: 5/ Date:   Tx#: 6/   TE: 15 min  AROM c-spine all dir x 10  PROM c-spine all dir x 10  YTB ER x 10  YTB ext x 10  Sci Fit lv 1 3 min retro       MM: 15 min  Manual cervical traction  SOR, TPR to scalenes, cervical ps, MT/Rhom       NM: 8 min  Scapular retraction x 10  Cervical retraction sm range x 10  Prone scap ret 3 sec x 10  Prone I x 2/10              HEP: YTB ER, YTB ext,Prone I    Charges: MM, NM, TE       Total Timed Treatment: 38 min  Total Treatment Time: 40 min

## 2024-03-21 ENCOUNTER — OFFICE VISIT (OUTPATIENT)
Dept: PHYSICAL THERAPY | Age: 69
End: 2024-03-21
Attending: INTERNAL MEDICINE
Payer: MEDICARE

## 2024-03-21 PROCEDURE — 97112 NEUROMUSCULAR REEDUCATION: CPT

## 2024-03-21 PROCEDURE — 97140 MANUAL THERAPY 1/> REGIONS: CPT

## 2024-03-21 PROCEDURE — 97110 THERAPEUTIC EXERCISES: CPT

## 2024-03-21 NOTE — PROGRESS NOTES
Diagnosis:   Neck pain (M54.2)       Referring Provider: Lj  Date of Evaluation:    3/11/24    Precautions:  None Next MD visit:   none scheduled  Date of Surgery: n/a   Insurance Primary/Secondary: MEDICARE / [employer]     # Auth Visits: 3/10            Subjective: Pt reports that she doesn't feel better. Pt reports increased pain with cervical rotation B. Pt reports going to the gym and cleaning but not doing anything out of the ordinary.    Pain: 6/10      Objective: See table for program.      Assessment: Spasms of bilat UT, scalenes, suboccipital triangle noted. Little/no relief noted with treatment. Mod/max VC/TC for good completion of exercises.      Goals:   (to be met in 10 visits)   - Pt indep with HEP 5 of 7 days a week.  - Full cervical ROM to allow for looking up into cabinet without c/o.  - 1/2 to full grade increase in scapular mm strength to allow for improved posture throughout session.  - Min/no TTP/spasms of cervical musculature to allow for improved sleeping.    Plan: Advance postural strengthening and cervical ROM as able.  Date: 3/19/2024  TX#: 2/10 Date:  3/21/24               TX#: 3/10 Date:                 TX#: 4/ Date:                 TX#: 5/ Date:   Tx#: 6/   TE: 15 min  AROM c-spine all dir x 10  PROM c-spine all dir x 10  YTB ER x 10  YTB ext x 10  Sci Fit lv 1 3 min retro TE: 15 min  PROM c-spine rot and SB B x 10  YTB ER x 10  -YTB ext x 2/10  Wall push  x 2/10  Sci Fit lv 1 x 3 min retro   MM: 15 min  Manual cervical traction  SOR, TPR to scalenes, cervical ps, MT/Rhom   NM: 10 min  Scapular retraction x 10  Prone scap ret 3 sec x 10  Prone I x 2/10         MM: 15 min  Manual cervical traction  SOR, TPR to scalenes, cervical ps, MT/Rhom       NM: 8 min  Scapular retraction x 10  Cervical retraction sm range x 10  Prone scap ret 3 sec x 10  Prone I x 2/10              HEP: cervical isometrics (ext, rot),     Charges: MM, NM, TE       Total Timed Treatment: 38  min  Total Treatment Time: 40 min

## 2024-03-25 ENCOUNTER — OFFICE VISIT (OUTPATIENT)
Dept: PHYSICAL THERAPY | Age: 69
End: 2024-03-25
Attending: INTERNAL MEDICINE
Payer: MEDICARE

## 2024-03-25 PROCEDURE — 97110 THERAPEUTIC EXERCISES: CPT | Performed by: PHYSICAL THERAPIST

## 2024-03-25 PROCEDURE — 97012 MECHANICAL TRACTION THERAPY: CPT | Performed by: PHYSICAL THERAPIST

## 2024-03-25 PROCEDURE — 97140 MANUAL THERAPY 1/> REGIONS: CPT | Performed by: PHYSICAL THERAPIST

## 2024-03-25 NOTE — PROGRESS NOTES
Diagnosis:   Neck pain (M54.2)       Referring Provider: Lj  Date of Evaluation:    3/11/24    Precautions:  None Next MD visit:   none scheduled  Date of Surgery: n/a   Insurance Primary/Secondary: MEDICARE / [employer]     # Auth Visits: 4/10            Subjective: Pt reports yesterday was a little better.   States still painful turning to the R.      Pain: 2-3  /10      Objective: See table for program.      Assessment: Tightness and restriction noted R > L UT/scalenes, cervical paraspinals, sub occipital region.  Reviewed posture and use of lumbar support to help with posture as patient with poor sitting posture and forward positionining.  Patient reported felt much more comfortable on neck.  Reviewed correct form for scapular retraction sitting and seated rows, along with cervical retraction.  Added supine ROT R/L for home and wall slides.        Goals:   (to be met in 10 visits)   - Pt indep with HEP 5 of 7 days a week.  - Full cervical ROM to allow for looking up into cabinet without c/o.  - 1/2 to full grade increase in scapular mm strength to allow for improved posture throughout session.  - Min/no TTP/spasms of cervical musculature to allow for improved sleeping.    Plan: Continue work on posture, correct form with exercises, ROM/strengthening work.  Assess if further help with traction could be beneficial.    Date: 3/19/2024  TX#: 2/10 Date:  3/25/24               TX#: 4/10 Date:                 TX#: 4/ Date:                 TX#: 5/ Date:   Tx#: 6/   TE: 15 min  AROM c-spine all dir x 10  PROM c-spine all dir x 10  YTB ER x 10  YTB ext x 10  Sci Fit lv 1 3 min retro TE: 15 min  PROM c-spine rot and SB B x 10  YTB ER x 10  -YTB ext x 2/10  Wall push  x 2/10  Sci Fit lv 1 x 3 min retro   MM: 15 min  Manual cervical traction  SOR, TPR to scalenes, cervical ps, MT/Rhom   NM: 10 min  Scapular retraction x 10  Prone scap ret 3 sec x 10  Prone I x 2/10    Supine:  STM, cervical paraspinals,  sub occipital region, R/L UT/scalanes  Stretches scalenes, UT R/L,   Retractions therapist , retraction/extension x 2  Supine - on towel ROT R/L x 12 each  Mechanical traction x 10 min.  28 # max.   Supine on towel ROT R/L x 12 each  Supine - retractions cervical x 10  Sitting :   Posture work  Scapular retractions x 10  Cervical retractions x 10     MM: 15 min  Manual cervical traction  SOR, TPR to scalenes, cervical ps, MT/Rhom  Wall slides x 10 following with head     NM: 8 min  Scapular retraction x 10  Cervical retraction sm range x 10  Prone scap ret 3 sec x 10  Prone I x 2/10              HEP: wall slides, posture correction with lumbar support    Charges: MM, 20 min mechanical traction 10 min, Ex 1 10 min      Total Timed Treatment: 40 min  Total Treatment Time: 40 min

## 2024-03-27 ENCOUNTER — TELEPHONE (OUTPATIENT)
Dept: INTERNAL MEDICINE CLINIC | Facility: CLINIC | Age: 69
End: 2024-03-27

## 2024-03-27 ENCOUNTER — OFFICE VISIT (OUTPATIENT)
Dept: PHYSICAL THERAPY | Age: 69
End: 2024-03-27
Attending: INTERNAL MEDICINE
Payer: MEDICARE

## 2024-03-27 PROCEDURE — 97012 MECHANICAL TRACTION THERAPY: CPT

## 2024-03-27 PROCEDURE — 97110 THERAPEUTIC EXERCISES: CPT

## 2024-03-27 PROCEDURE — 97140 MANUAL THERAPY 1/> REGIONS: CPT

## 2024-03-27 NOTE — PROGRESS NOTES
Diagnosis:   Neck pain (M54.2)       Referring Provider: Lj  Date of Evaluation:    3/11/24    Precautions:  None Next MD visit:   none scheduled  Date of Surgery: n/a   Insurance Primary/Secondary: MEDICARE / [employer]     # Auth Visits: 5/10            Subjective: Pt reports that the traction from last visit \"really helped\". Pt reported completing HEP since last visit. Pt primary complaint remains right cervical rotation.    Pain: 2/10      Objective: See table for program.      Assessment: Pt with noticeably improved left rotation post treatment. Pt noted that she has improved since starting PT, but that she doesn't feel much different than when she came in this morning.      Goals:   (to be met in 10 visits)   - Pt indep with HEP 5 of 7 days a week.  - Full cervical ROM to allow for looking up into cabinet without c/o.  - 1/2 to full grade increase in scapular mm strength to allow for improved posture throughout session.  - Min/no TTP/spasms of cervical musculature to allow for improved sleeping.    Plan: Continue work on posture, correct form with exercises, ROM/strengthening work.  Assess if further help with traction could be beneficial.    Date: 3/19/2024  TX#: 2/10 Date:  3/21/24               TX#: 3/10 Date:3/25/24                 TX#: 4/10 Date: 3/27/24                TX#: 5/10 Date:   Tx#: 6/   TE: 15 min  AROM c-spine all dir x 10  PROM c-spine all dir x 10  YTB ER x 10  YTB ext x 10  Sci Fit lv 1 3 min retro TE: 15 min  PROM c-spine rot and SB B x 10  YTB ER x 10  -YTB ext x 2/10  Wall push  x 2/10  Sci Fit lv 1 x 3 min retro   MM: 15 min  Manual cervical traction  SOR, TPR to scalenes, cervical ps   NM: 10 min  Scapular retraction x 10  Prone scap ret 3 sec x 10  Prone I x 2/10    Supine:  STM, cervical paraspinals, sub occipital region, R/L UT/scalanes  Stretches scalenes, UT R/L,   Retractions therapist , retraction/extension x 2  Supine - on towel ROT R/L x 12 each  Mechanical  traction x 10 min.  28 # max.   Supine on towel ROT R/L x 12 each  Supine - retractions cervical x 10  Sitting :   Posture work  Scapular retractions x 10  Cervical retractions x 10 TE: 10 min  PROM c-spine rot and SB B x 10  Sci Fit lv 1 x 3 min retro  Supine- on towel, Rot R/L x 10 each   MM: 20 min  SOR, TPR to scalenes, cervical ps  PA glides C4-6   NM: 3 min  Scapular retraction x 10 seated  Cervical retraction in supine x 10     Mechanical traction x 10 min, 18 lb max    MM: 15 min  Manual cervical traction  SOR, TPR to scalenes, cervical ps, MT/Rhom  Wall slides x 10 following with head     NM: 8 min  Scapular retraction x 10  Cervical retraction sm range x 10  Prone scap ret 3 sec x 10  Prone I x 2/10              HEP: wall slides, posture correction with lumbar support    Charges: MM, TE, Mech Traction      Total Timed Treatment: 40 min  Total Treatment Time: 40 min

## 2024-03-28 RX ORDER — LANCETS 33 GAUGE
1 EACH MISCELLANEOUS 2 TIMES DAILY
Qty: 200 EACH | Refills: 1 | Status: SHIPPED | OUTPATIENT
Start: 2024-03-28

## 2024-04-01 ENCOUNTER — OFFICE VISIT (OUTPATIENT)
Dept: PHYSICAL THERAPY | Age: 69
End: 2024-04-01
Attending: INTERNAL MEDICINE
Payer: MEDICARE

## 2024-04-01 ENCOUNTER — HOSPITAL ENCOUNTER (OUTPATIENT)
Dept: GENERAL RADIOLOGY | Age: 69
Discharge: HOME | End: 2024-04-01
Attending: INTERNAL MEDICINE
Payer: MEDICARE

## 2024-04-01 DIAGNOSIS — M54.2 NECK PAIN: ICD-10-CM

## 2024-04-01 PROCEDURE — 72050 X-RAY EXAM NECK SPINE 4/5VWS: CPT | Performed by: INTERNAL MEDICINE

## 2024-04-01 PROCEDURE — 97140 MANUAL THERAPY 1/> REGIONS: CPT

## 2024-04-01 PROCEDURE — 97012 MECHANICAL TRACTION THERAPY: CPT

## 2024-04-01 PROCEDURE — 97110 THERAPEUTIC EXERCISES: CPT

## 2024-04-01 NOTE — PROGRESS NOTES
Diagnosis:   Neck pain (M54.2)       Referring Provider: Lj  Date of Evaluation:    3/11/24    Precautions:  None Next MD visit:   none scheduled  Date of Surgery: n/a   Insurance Primary/Secondary: MEDICARE / [employer]     # Auth Visits: 6/10            Subjective: Pt reports continued pn with rotation. Pt also notes increased pn with looking straight ahead for any length of time.  Pain: 3/10      Objective: See table for program.      Assessment: Significant TTP and spasm noted with palp of B UT and levatorwale. Pt reported 2/10 (or less) pain post treatment.      Goals:   (to be met in 10 visits)   - Pt indep with HEP 5 of 7 days a week.  - Full cervical ROM to allow for looking up into cabinet without c/o.  - 1/2 to full grade increase in scapular mm strength to allow for improved posture throughout session.  - Min/no TTP/spasms of cervical musculature to allow for improved sleeping.    Plan: Continue work on posture, correct form with exercises, ROM/strengthening work.    Date:3/25/24                 TX#: 4/10 Date: 3/27/24                TX#: 5/10 Date: 4/1/24  Tx#: 6/10   Supine:  STM, cervical paraspinals, sub occipital region, R/L UT/scalanes  Stretches scalenes, UT R/L,   Retractions therapist , retraction/extension x 2  Supine - on towel ROT R/L x 12 each  Mechanical traction x 10 min.  28 # max.   Supine on towel ROT R/L x 12 each  Supine - retractions cervical x 10  Sitting :   Posture work  Scapular retractions x 10  Cervical retractions x 10 TE: 10 min  PROM c-spine rot and SB B x 10  Sci Fit lv 1 x 3 min retro  Supine- on towel, Rot R/L x 10 each   MM: 20 min  SOR, TPR to scalenes, cervical ps  PA glides C4-6   NM: 3 min  Scapular retraction x 10 seated  Cervical retraction in supine x 10     Mechanical traction x 10 min, 18 lb max TE: 10 min  PROM c-spine rot and SB B x 10  Sci Fit lv 1 x 3 min retro  Supine- on towel, Rot R/L x 10 each   MM: 20 min  SOR, TPR to scalenes,  cervical ps, levator, UT  PA glides C4-6   NM: 3 min  Scapular retraction x 10 seated  Cervical retraction in supine x 10     Mechanical traction x 10 min, 22.5 lb max.   Wall slides x 10 following with head               HEP: wall slides, posture correction with lumbar support    Charges: MM, TE, [facility] Traction      Total Timed Treatment: 40 min  Total Treatment Time: 40 min

## 2024-04-03 RX ORDER — MELOXICAM 15 MG/1
15 TABLET ORAL DAILY
Qty: 30 TABLET | Refills: 0 | Status: SHIPPED | OUTPATIENT
Start: 2024-04-03 | End: 2024-05-03

## 2024-04-03 NOTE — TELEPHONE ENCOUNTER
Refill passed per [facility] protocol. However please advise if refill appropriate?    LOV notes 03/04/24:  Will do a trial of meloxicam. If there is no improvement, we will proceed with physical therapy.     Requested Prescriptions   Pending Prescriptions Disp Refills    MELOXICAM 15 MG Oral Tab [Pharmacy Med Name: MELOXICAM 15 MG TABLET] 30 tablet 0     Sig: TAKE 1 TABLET (15 MG TOTAL) BY MOUTH DAILY. WITH MEALS       Non-Narcotic Pain Medication Protocol Passed - 3/31/2024  6:59 AM        Passed - In person appointment or virtual visit in the past 6 mos or appointment in next 3 mos     Recent Outpatient Visits              Yesterday     [facility] [place] Thalia Taylor, PT    Office Visit    6 days ago     [facility] [place] Thalia Taylor, PT    Office Visit    1 week ago     [facility] [place] Anamika Chun, PT    Office Visit    1 week ago     [facility] [place] Thalia Taylor, PT    Office Visit    2 weeks ago     [facility] [place] Thalia Taylor, PT    Office Visit          Future Appointments         Provider Department Appt Notes    In 2 days Thalia Taylor PT [facility] [place] UHC/Medicare  do not collect c/p, 60 visits, no auth    In 1 week Thalia Taylor PT [facility] [place] [employer]Med[employer]are  do not collect c/p, 60 visits, no auth    In 1 week Thalia Taylor PT [facility] [place] [employer]/Medicare  do not collect c/p, 60 visits, no auth    In 4 months Hadley Calhoun MD [facility] 5M FU    In 5 months Chriss Kenney MD [facility] Yearly eye exam

## 2024-04-04 ENCOUNTER — OFFICE VISIT (OUTPATIENT)
Dept: PHYSICAL THERAPY | Age: 69
End: 2024-04-04
Attending: INTERNAL MEDICINE
Payer: MEDICARE

## 2024-04-04 PROCEDURE — 97012 MECHANICAL TRACTION THERAPY: CPT

## 2024-04-04 PROCEDURE — 97140 MANUAL THERAPY 1/> REGIONS: CPT

## 2024-04-04 PROCEDURE — 97110 THERAPEUTIC EXERCISES: CPT

## 2024-04-04 NOTE — PROGRESS NOTES
Diagnosis:   Neck pain (M54.2)       Referring Provider: No ref. provider found  Date of Evaluation:    3/11/24    Precautions:  None Next MD visit:   none scheduled  Date of Surgery: n/a   Insurance Primary/Secondary: MEDICARE / [email or web address]     # Auth Visits: 7/10            Subjective: Pt reports that her pain is improving. Pt adds that most of her pain is when she looks up or to the left.  Pain: 1/10 currently  4/10 with cervical extension      Objective: See table for program.  Educated pt in importance of good compliance with strengthening HEP. Pt demonstrated understanding.      Assessment: Palpable spasms B UT, levator, scalenes, SOT, cervical ps- reduced from previous visit. Some reduction in pain complaints overall. Pt reported cervical ext end of session was 1/10 pn.      Goals:   (to be met in 10 visits)   - Pt indep with HEP 5 of 7 days a week.  - Full cervical ROM to allow for looking up into cabinet without c/o.  - 1/2 to full grade increase in scapular mm strength to allow for improved posture throughout session.  - Min/no TTP/spasms of cervical musculature to allow for improved sleeping.    Plan: Continue work on posture, correct form with exercises, ROM/strengthening work.    Date:3/25/24                 TX#: 4/10 Date: 3/27/24                TX#: 5/10 Date: 4/1/24  Tx#: 6/10 Date: 4/4/24  Tx#: 7/10   Supine:  STM, cervical paraspinals, sub occipital region, R/L UT/scalanes  Stretches scalenes, UT R/L,   Retractions therapist , retraction/extension x 2  Supine - on towel ROT R/L x 12 each  Mechanical traction x 10 min.  28 # max.   Supine on towel ROT R/L x 12 each  Supine - retractions cervical x 10  Sitting :   Posture work  Scapular retractions x 10  Cervical retractions x 10 TE: 10 min  PROM c-spine rot and SB B x 10  Sci Fit lv 1 x 3 min retro  Supine- on towel, Rot R/L x 10 each   MM: 20 min  SOR, TPR to scalenes, cervical ps  PA glides C4-6   NM: 3 min  Scapular retraction x 10  seated  Cervical retraction in supine x 10     Mechanical traction x 10 min, 18 lb max TE: 10 min  PROM c-spine rot and SB B x 10  Sci Fit lv 1 x 3 min retro  Supine- on towel, Rot R/L x 10 each   MM: 20 min  SOR, TPR to scalenes, cervical ps, levator, UT  PA glides C4-6   NM: 3 min  Scapular retraction x 10 seated  Cervical retraction in supine x 10     Mechanical traction x 10 min, 22.5 lb max. TE: 10 min  PROM c-spine rot and SB B x 10  Sci Fit lv 1 x 3 min retro  Supine- on towel, Rot R/L x 10 each  Seated towel roll cervical ext x 10   MM: 20 min  SOR, TPR to scalenes, cervical ps, levator, UT  PA glides C4-6   Mechanical traction x 10 min 24 lb max   Wall slides x 10 following with head                  HEP: wall slides, posture correction with lumbar support    Charges: MM, TE, [facility] Traction      Total Timed Treatment: 40 min  Total Treatment Time: 40 min

## 2024-04-05 RX ORDER — METOPROLOL SUCCINATE 25 MG/1
25 TABLET, EXTENDED RELEASE ORAL DAILY
Qty: 90 TABLET | Refills: 3 | Status: SHIPPED | OUTPATIENT
Start: 2024-04-05

## 2024-04-05 RX ORDER — ATORVASTATIN CALCIUM 10 MG/1
10 TABLET, FILM COATED ORAL NIGHTLY
Qty: 90 TABLET | Refills: 1 | Status: SHIPPED | OUTPATIENT
Start: 2024-04-05

## 2024-04-05 NOTE — TELEPHONE ENCOUNTER
REFILL PASSED PER [facility] PROTOCOLS    Requested Prescriptions   Pending Prescriptions Disp Refills    METOPROLOL SUCCINATE ER 25 MG Oral Tablet 24 Hr [Pharmacy Med Name: METOPROLOL SUCC ER 25 MG TAB] 90 tablet 3     Sig: Take 1 tablet (25 mg total) by mouth daily.       Hypertension Medications Protocol Passed - 4/5/2024 12:39 AM        Passed - CMP or BMP in past 12 months        Passed - Last BP reading less than 140/90     BP Readings from Last 1 Encounters:   03/04/24 138/80               Passed - In person appointment or virtual visit in the past 12 mos or appointment in next 3 mos     Recent Outpatient Visits              Yesterday     [facility] [place] Thalia Taylor, PT    Office Visit    4 days ago     [facility] [place] Thalia Taylor, PT    Office Visit    1 week ago     [facility] [place] Thalia Taylor, PT    Office Visit    1 week ago     [facility] [place] Anamika Chun PT    Office Visit    2 weeks ago     [facility] [place] Thalia Taylor PT    Office Visit          Future Appointments         Provider Department Appt Notes    In 6 days Thalia Taylor PT [facility] in [place] UHC/Medicare  do not collect c/p, 60 visits, no auth    In 1 week Thalia Taylor PT [facility] [place] [employer]/Medicare  do not collect c/p, 60 visits, no auth    In 4 months Hadley Calhoun MD [facility] 5M FU    In 5 months Chriss Kenney MD [facility] Yearly eye exam               Passed - EGFRCR or GFRNAA > 50     GFR Evaluation  EGFRCR: 80 , resulted on 10/13/2023               Future Appointments         Provider Department Appt Notes    In 6 days Thalia Taylor PT [facility] [place] UHC/Medicare  do not collect c/p, 60 visits, no auth    In 1 week Thalia Taylor PT  [facility] [place] UHC/Medicare  do not collect c/p, 60 visits, no auth    In 4 months Hadley Calhoun MD [facility] 5M FU    In 5 months Chriss Kenney MD [facility] Yearly eye exam          Recent Outpatient Visits              Yesterday     [facility] [place] Thalia Taylor, PT    Office Visit    4 days ago     [facility] [place] Thalia Taylor, PT    Office Visit    1 week ago     [facility] [place] Thalia Taylor, PT    Office Visit    1 week ago     [facility] [place] Anamika Chun, PT    Office Visit    2 weeks ago     [facility] [place] Thalia Taylor, PT    Office Visit

## 2024-04-05 NOTE — TELEPHONE ENCOUNTER
Refill passed per [facility] protocol.     Requested Prescriptions   Pending Prescriptions Disp Refills    ATORVASTATIN 10 MG Oral Tab [Pharmacy Med Name: ATORVASTATIN 10 MG TABLET] 90 tablet 3     Sig: TAKE 1 TABLET BY MOUTH EVERY DAY AT NIGHT       Cholesterol Medication Protocol Passed - 4/5/2024  9:18 AM        Passed - ALT < 80     Lab Results   Component Value Date    ALT 56 10/13/2023             Passed - ALT resulted within past year        Passed - Lipid panel within past 12 months     Lab Results   Component Value Date    CHOLEST 130 10/13/2023    TRIG 166 (H) 10/13/2023    HDL 47 10/13/2023    LDL 55 10/13/2023    VLDL 24 10/13/2023    TCHDLRATIO 3.0 09/28/2022    NONHDLC 83 10/13/2023             Passed - In person appointment or virtual visit in the past 12 mos or appointment in next 3 mos     Recent Outpatient Visits              Yesterday     [facility] [place] Thalia Taylor, PT    Office Visit    4 days ago     [facility] [place] Thalia Taylor, PT    Office Visit    1 week ago     [facility] [place] Thalia Taylor, PT    Office Visit    1 week ago     [facility] [place] Anamika Chun PT    Office Visit    2 weeks ago     [facility] [place] Thalia Taylor, PT    Office Visit          Future Appointments         Provider Department Appt Notes    In 6 days Thalia Taylor PT [facility] [place] UHC/Medicare  do not collect c/p, 60 visits, no auth    In 1 week Thalia Taylor PT [facility] [place] UHC/Medicare  do not collect c/p, 60 visits, no auth    In 4 months Hadley Calhoun MD [facility] 5M FU    In 5 months Chriss Kenney MD [facility] Yearly eye exam                     Recent Outpatient Visits              Yesterday     [facility] [place],  Thalia, PT    Office Visit    4 days ago     [facility] [place] Thalia Taylor, PT    Office Visit    1 week ago     [facility] [place] Thalia Taylor, PT    Office Visit    1 week ago     [facility] [place] Anamika Chun, PT    Office Visit    2 weeks ago     [facility] [place] Thalia Taylor, PT    Office Visit             Future Appointments         Provider Department Appt Notes    In 6 days Thalia Taylor PT [facility] [place] UHC/Medicare  do not collect c/p, 60 visits, no auth    In 1 week Thalia Taylor PT [facility] [place] [employer]/Medicare  do not collect c/p, 60 visits, no auth    In 4 months Hadley Calhoun MD [facility] 5M FU    In 5 months Chriss Kenney MD [facility] Yearly eye exam

## 2024-04-11 ENCOUNTER — OFFICE VISIT (OUTPATIENT)
Dept: PHYSICAL THERAPY | Age: 69
End: 2024-04-11
Attending: INTERNAL MEDICINE
Payer: MEDICARE

## 2024-04-11 PROCEDURE — 97110 THERAPEUTIC EXERCISES: CPT

## 2024-04-11 PROCEDURE — 97140 MANUAL THERAPY 1/> REGIONS: CPT

## 2024-04-11 PROCEDURE — 97012 MECHANICAL TRACTION THERAPY: CPT

## 2024-04-11 NOTE — PROGRESS NOTES
Diagnosis:   Neck pain (M54.2)       Referring Provider: No ref. provider found  Date of Evaluation:    3/11/24    Precautions:  None Next MD visit:   none scheduled  Date of Surgery: n/a   Insurance Primary/Secondary: MEDICARE / [employer]     # Auth Visits: 8/10            Subjective: Pt reports that she feels much better, just experiencing a little bit of pain when looking upward.    Pain: 2/10 currently  2/10 with cervical extension      Objective: See table for program.   Educated pt in importance of good consistency with scapular stabilization HEP, anatomy of injury. Pt demonstrated understanding.      Assessment: Overall good improvement in pain complaints and function.      Goals:   (to be met in 10 visits)   - Pt indep with HEP 5 of 7 days a week.  - Full cervical ROM to allow for looking up into cabinet without c/o.- in progress, min c/o  - 1/2 to full grade increase in scapular mm strength to allow for improved posture throughout session.- in progress  - Min/no TTP/spasms of cervical musculature to allow for improved sleeping.    Plan: Continue work on posture, correct form with exercises, ROM/strengthening work.    Date: 4/1/24  Tx#: 6/10 Date: 4/4/24  Tx#: 7/10 Date: 4/11/24  Tx#: 8/10   TE: 10 min  PROM c-spine rot and SB B x 10  Sci Fit lv 1 x 3 min retro  Supine- on towel, Rot R/L x 10 each   MM: 20 min  SOR, TPR to scalenes, cervical ps, levator, UT  PA glides C4-6   NM: 3 min  Scapular retraction x 10 seated  Cervical retraction in supine x 10     Mechanical traction x 10 min, 22.5 lb max. TE: 10 min  PROM c-spine rot and SB B x 10  Sci Fit lv 1 x 3 min retro  Supine- on towel, Rot R/L x 10 each  Seated towel roll cervical ext x 10   MM: 20 min  SOR, TPR to scalenes, cervical ps, levator, UT  PA glides C4-6   Mechanical traction x 10 min 24 lb max TE: 10 min  PROM c-spine rot and SB B x 10  Sci Fit lv 1 x 3 min retro  Seated towel roll cervical ext x 10   MM: 20 min  SOR, TPR to  scalenes, cervical ps, levator, UT  PA glides C4-6   Mechanical traction x 10 min 24 lb max                  HEP: wall slides, posture correction with lumbar support    Charges: MM, TE, [facility] Traction      Total Timed Treatment: 40 min  Total Treatment Time: 40 min

## 2024-04-15 ENCOUNTER — OFFICE VISIT (OUTPATIENT)
Dept: PHYSICAL THERAPY | Age: 69
End: 2024-04-15
Attending: INTERNAL MEDICINE
Payer: MEDICARE

## 2024-04-15 PROCEDURE — 97110 THERAPEUTIC EXERCISES: CPT

## 2024-04-15 PROCEDURE — 97012 MECHANICAL TRACTION THERAPY: CPT

## 2024-04-15 PROCEDURE — 97140 MANUAL THERAPY 1/> REGIONS: CPT

## 2024-04-15 NOTE — PROGRESS NOTES
Diagnosis:   Neck pain (M54.2)       Referring Provider: No ref. provider found  Date of Evaluation:    3/11/24    Precautions:  None Next MD visit:   none scheduled  Date of Surgery: n/a   Insurance Primary/Secondary: MEDICARE / [employer] [facility]     # Auth Visits: 9/10            Subjective: Pt reports     Pain: 3/10 currently, mostly with rotation to either side, as well as with cervical extension.        Objective: See table for program.   TTP and spasm noted of bilat UE, levator, scalenes (L>R), suboccipital triangle, cervical ps.  MT/Rhom 4-/5 B      Assessment: Overall good improvement in pain complaints and function.Pt demonstrated understanding of importance of continued compliance with HEP.      Goals:   (to be met in 10 visits)   - Pt indep with HEP 5 of 7 days a week.- met  - Full cervical ROM to allow for looking up into cabinet without c/o.- in progress, min c/o  - 1/2 to full grade increase in scapular mm strength to allow for improved posture throughout session.- in progress  - Min/no TTP/spasms of cervical musculature to allow for improved sleeping.- improved, but intermittent c/o.    Plan: Pt is discharged to Indep HEP at this time. To contact PT with any further questions or concerns.  Date: 4/1/24  Tx#: 6/10 Date: 4/4/24  Tx#: 7/10 Date: 4/11/24  Tx#: 8/10 Date: 4/15/24  Tx#: 9/10   TE: 10 min  PROM c-spine rot and SB B x 10  Sci Fit lv 1 x 3 min retro  Supine- on towel, Rot R/L x 10 each   MM: 20 min  SOR, TPR to scalenes, cervical ps, levator, UT  PA glides C4-6   NM: 3 min  Scapular retraction x 10 seated  Cervical retraction in supine x 10     Mechanical traction x 10 min, 22.5 lb max. TE: 10 min  PROM c-spine rot and SB B x 10  Sci Fit lv 1 x 3 min retro  Supine- on towel, Rot R/L x 10 each  Seated towel roll cervical ext x 10   MM: 20 min  SOR, TPR to scalenes, cervical ps, levator, UT  PA glides C4-6   Mechanical traction x 10 min 24 lb max TE: 10 min  PROM c-spine rot and SB B x  10  Sci Fit lv 1 x 3 min retro  Seated towel roll cervical ext x 10   MM: 20 min  SOR, TPR to scalenes, cervical ps, levator, UT  PA glides C4-6   Mechanical traction x 10 min 24 lb max TE: 10 min  PROM c-spine rot and SB B x 10  Passive stretching of scalenes, UT/levator  Seated towel roll cervical ext x 10   MM: 20 min  SOR, TPR to scalenes, cervical ps, levator, UT  PA glides C4-6   Mechanical traction x 10 min 28 lb max                     HEP: wall slides, posture correction with lumbar support    Charges: MM, TE, Mech Traction      Total Timed Treatment: 40 min  Total Treatment Time: 40 min

## 2024-04-18 ENCOUNTER — NURSE TRIAGE (OUTPATIENT)
Dept: INTERNAL MEDICINE CLINIC | Facility: CLINIC | Age: 69
End: 2024-04-18

## 2024-04-18 ENCOUNTER — OFFICE VISIT (OUTPATIENT)
Dept: INTERNAL MEDICINE CLINIC | Facility: CLINIC | Age: 69
End: 2024-04-18

## 2024-04-18 VITALS
HEIGHT: 62 IN | DIASTOLIC BLOOD PRESSURE: 58 MMHG | HEART RATE: 69 BPM | SYSTOLIC BLOOD PRESSURE: 120 MMHG | WEIGHT: 146 LBS | BODY MASS INDEX: 26.87 KG/M2

## 2024-04-18 DIAGNOSIS — R21 RASH: ICD-10-CM

## 2024-04-18 DIAGNOSIS — R21 RASH OF BACK: Primary | ICD-10-CM

## 2024-04-18 PROCEDURE — 99213 OFFICE O/P EST LOW 20 MIN: CPT | Performed by: NURSE PRACTITIONER

## 2024-04-18 RX ORDER — CLOTRIMAZOLE 1 %
1 CREAM (GRAM) TOPICAL 2 TIMES DAILY
Qty: 1 EACH | Refills: 0 | Status: SHIPPED | OUTPATIENT
Start: 2024-04-18

## 2024-04-18 RX ORDER — BETAMETHASONE DIPROPIONATE 0.5 MG/G
1 CREAM TOPICAL 2 TIMES DAILY
Qty: 15 G | Refills: 0 | Status: SHIPPED | OUTPATIENT
Start: 2024-04-18

## 2024-04-18 NOTE — ASSESSMENT & PLAN NOTE
Circular itchy rash. Most likely fungal rash    Plan      betamethasone dipropionate 0.05 % External Cream          Apply 1 Application topically 2 (two) times daily., Normal, Disp-15 g, R-0       clotrimazole 1 % External Cream         Apply 1 Application topically 2 (two) times daily., Normal, Disp-1 each, R-0       Derm Referral - In Network

## 2024-04-18 NOTE — PROGRESS NOTES
HPI:    Patient ID: Charlotte Hernández is a 68 year old female.    HPI Rash on Back  68 year old female I am meeting for the first time.  She had a small itchy dark patch on her back and it is increasing in size  See photo.      Immunization History   Administered Date(s) Administered    Covid-19 Vaccine Pfizer 30 mcg/0.3 ml 2021, 2021, 2021    Covid-19 Vaccine Pfizer Bivalent 30mcg/0.3mL 10/26/2022    FLU VAC High Dose 65 YRS & Older PRSV Free (07072) 10/28/2021, 10/10/2022, 2023    FLULAVAL 6 months & older 0.5 ml Prefilled syringe (56779) 10/09/2017, 10/04/2018, 2019, 2020    FLUZONE 6 months and older PFS 0.5 ml (82664) 2013, 2015, 2016, 10/09/2017, 10/04/2018, 2019, 2020    Fluzone Vaccine Medicare () 10/28/2021    Influenza 10/10/2007, 10/13/2009, 10/15/2010, 10/03/2011, 2013, 10/01/2014    Influenza Vaccine, Preserv Free 10/31/2008    Pneumococcal (Prevnar 13) 2021    Pneumovax 23 10/04/2018    Zoster Vaccine Recombinant Adjuvanted (Shingrix) 10/15/2022, 04/10/2023       Past Medical History:    Anxiety state    Asthmatic bronchitis without complication (HCC)    BPV (benign positional vertigo)    Carpal tunnel syndrome    Disorder of thyroid    Diverticular disease    DUB (dysfunctional uterine bleeding)    Endometrial biopsy in     Gastritis    Helicobacter pylori infection    EGD with Biopsy in     High blood pressure    History of pregnancy     in  and ,  in     History of     Hyperlipemia    Observation for suspected condition    Osteoarthritis    Pap smear, as part of routine gynecological examination    Right shoulder tendinitis    Routine physical examination    Screen for colon cancer    repeat CLN in 10 years    Sleep apnea    Syncope and collapse    Type II or unspecified type diabetes mellitus without mention of complication, not stated as uncontrolled    Visual impairment     Readers      Past Surgical History:   Procedure Laterality Date    Adj tiss xfer lid,nos,ear <10sqcm Left 10/15/2021    L ear lobular reconstruction with flap      ,     Carpal tunnel release Right     Cholecystectomy      Colonoscopy      Negative    Colonoscopy & polypectomy  2010    Colonoscopy with Polypectomy and Biopsy, per NG    Endometrial bx conjunct w/colposcopy      Upper gi endoscopy,biopsy      EGD with Biopsy      Social History     Socioeconomic History    Marital status:    Tobacco Use    Smoking status: Never    Smokeless tobacco: Never   Vaping Use    Vaping status: Never Used   Substance and Sexual Activity    Alcohol use: No     Alcohol/week: 0.0 standard drinks of alcohol    Drug use: No    Sexual activity: Not Currently     Partners: Male   Other Topics Concern    Caffeine Concern Yes     Comment: Tea, Coffee 2 cups daily;     Exercise Yes    Pt has a pacemaker No    Reaction to local anesthetic No          Review of Systems   Constitutional:  Negative for chills, fatigue and fever.   HENT:  Negative for ear pain, hearing loss, sinus pain, sore throat and trouble swallowing.    Eyes:  Negative for pain and visual disturbance.   Respiratory:  Negative for cough, chest tightness and shortness of breath.    Cardiovascular:  Negative for chest pain, palpitations and leg swelling.   Gastrointestinal:  Negative for abdominal pain, constipation, diarrhea, nausea and vomiting.   Endocrine: Negative for cold intolerance and heat intolerance.   Genitourinary:  Negative for dysuria and hematuria.   Musculoskeletal:  Negative for back pain and joint swelling.   Skin:  Positive for rash.   Allergic/Immunologic: Negative for environmental allergies.   Neurological:  Negative for weakness, numbness and headaches.   Hematological:  Does not bruise/bleed easily.   Psychiatric/Behavioral:  Negative for dysphoric mood and sleep disturbance. The patient is not  nervous/anxious.               Current Outpatient Medications   Medication Sig Dispense Refill    clotrimazole 1 % External Cream Apply 1 Application topically 2 (two) times daily. 1 each 0    betamethasone dipropionate 0.05 % External Cream Apply 1 Application topically 2 (two) times daily. 15 g 0    metoprolol succinate ER 25 MG Oral Tablet 24 Hr Take 1 tablet (25 mg total) by mouth daily. 90 tablet 3    atorvastatin 10 MG Oral Tab Take 1 tablet (10 mg total) by mouth nightly. 90 tablet 1    Meloxicam 15 MG Oral Tab Take 1 tablet (15 mg total) by mouth daily. With meals 30 tablet 0    OneTouch Delica Lancets 33G Does not apply Misc 1 each 2 (two) times daily. 200 each 1    pantoprazole 40 MG Oral Tab EC Take 1 tablet (40 mg total) by mouth before breakfast.      Hydroquinone 4 % External Cream 2 times daily when needed 28.35 g 0    metFORMIN  MG Oral Tablet 24 Hr Take 2 tablets (1,000 mg total) by mouth daily with dinner. 180 tablet 3    Glucose Blood (ONETOUCH ULTRA) In Vitro Strip 1 each by Other route in the morning and 1 each before bedtime. 200 strip 3    magnesium oxide 400 MG Oral Tab Take 1 tablet (400 mg total) by mouth daily. 90 tablet 3    Dulaglutide (TRULICITY) 0.75 MG/0.5ML Subcutaneous Solution Pen-injector Inject 0.75 mg into the skin once a week. 6 mL 3    hydrOXYzine 10 MG Oral Tab Take 1 tablet (10 mg total) by mouth every other day. 45 tablet 3    losartan 100 MG Oral Tab Take 1 tablet (100 mg total) by mouth daily. 90 tablet 3    Insulin Pen Needle (BD PEN NEEDLE CARY 2ND GEN) 32G X 4 MM Does not apply Misc Use as directed twice a day 200 each 3    levothyroxine 50 MCG Oral Tab Take 1 tablet (50 mcg total) by mouth before breakfast. 90 tablet 3    amLODIPine 10 MG Oral Tab Take 1 tablet (10 mg total) by mouth daily. 90 tablet 3    NovoLOG Mix 70/30 FlexPen 100 UNIT/ML Susp Pen-injector (Insulin Aspart Prot & Aspart 70/30) INJECT 34 UNITS SUBCUTANEOUS IN THE MORNING AND 24 UNITS AT NIGHT.  100 mL 3     Allergies:No Known Allergies   PHYSICAL EXAM:   Physical Exam  Constitutional:       Appearance: Normal appearance.       HENT:      Head: Normocephalic.      Mouth/Throat:      Mouth: Mucous membranes are moist.   Cardiovascular:      Rate and Rhythm: Normal rate and regular rhythm.      Pulses: Normal pulses.   Pulmonary:      Effort: Pulmonary effort is normal.   Musculoskeletal:         General: Normal range of motion.      Cervical back: Normal range of motion and neck supple.   Skin:     General: Skin is dry.   Neurological:      Mental Status: She is alert and oriented to person, place, and time.   Psychiatric:         Mood and Affect: Mood normal.         Behavior: Behavior normal.         Thought Content: Thought content normal.         Judgment: Judgment normal.       /58 (BP Location: Right arm, Patient Position: Sitting, Cuff Size: adult)   Pulse 69   Ht 5' 2\" (1.575 m)   Wt 146 lb (66.2 kg)   LMP  (LMP Unknown)   BMI 26.70 kg/m²   Wt Readings from Last 2 Encounters:   04/18/24 146 lb (66.2 kg)   03/04/24 144 lb (65.3 kg)     Body mass index is 26.7 kg/m².(2)  Lab Results   Component Value Date    WBC 5.5 10/13/2023    RBC 5.34 (H) 10/13/2023    HGB 14.1 10/13/2023    HCT 43.1 10/13/2023    MCV 80.7 10/13/2023    MCH 26.4 10/13/2023    MCHC 32.7 10/13/2023    RDW 13.3 10/13/2023    .0 10/13/2023    MPV 8.0 11/30/2018      Lab Results   Component Value Date     (H) 10/13/2023    BUN 8 10/13/2023    BUNCREA 9.9 (L) 10/13/2023    CREATSERUM 0.81 10/13/2023    ANIONGAP 8 10/13/2023    GFRNAA 60 02/10/2022    GFRAA 70 02/10/2022    CA 9.1 10/13/2023    OSMOCALC 293 10/13/2023    ALKPHO 106 10/13/2023    AST 57 (H) 10/13/2023    ALT 56 10/13/2023    ALKPHOS 51 07/14/2016    BILT 0.3 10/13/2023    TP 7.7 10/13/2023    ALB 3.4 10/13/2023    GLOBULIN 4.3 10/13/2023    AGRATIO 1.2 07/14/2016     10/13/2023    K 4.1 10/13/2023     10/13/2023    CO2 28.0 10/13/2023       Lab Results   Component Value Date    EAG  10/13/2023      Comment:      Sent to [facility] for testing.      A1C  10/13/2023      Comment:      Sent to [facility] for testing.        A1C 7.3 (H) 10/13/2023      Lab Results   Component Value Date    CHOLEST 130 10/13/2023    TRIG 166 (H) 10/13/2023    HDL 47 10/13/2023    LDL 55 10/13/2023    VLDL 24 10/13/2023    TCHDLRATIO 3.0 09/28/2022    NONHDLC 83 10/13/2023    CALCNONHDL 146 (H) 07/14/2016      Lab Results   Component Value Date    TSH 1.090 10/13/2023                ASSESSMENT/PLAN:     Problem List Items Addressed This Visit       Rash     Circular itchy rash. Most likely fungal rash    Plan      betamethasone dipropionate 0.05 % External Cream          Apply 1 Application topically 2 (two) times daily., Normal, Disp-15 g, R-0       clotrimazole 1 % External Cream         Apply 1 Application topically 2 (two) times daily., Normal, Disp-1 each, R-0       Derm Referral - In Network                  Relevant Medications    clotrimazole 1 % External Cream    betamethasone dipropionate 0.05 % External Cream     Other Visit Diagnoses       Rash of back    -  Primary    Relevant Medications    clotrimazole 1 % External Cream    betamethasone dipropionate 0.05 % External Cream    Other Relevant Orders    Derm Referral - In Network               No orders of the defined types were placed in this encounter.      Meds This Visit:  Requested Prescriptions     Signed Prescriptions Disp Refills    clotrimazole 1 % External Cream 1 each 0     Sig: Apply 1 Application topically 2 (two) times daily.    betamethasone dipropionate 0.05 % External Cream 15 g 0     Sig: Apply 1 Application topically 2 (two) times daily.       Imaging & Referrals:  DERM - INTERNAL         DAVID Pinedo

## 2024-04-18 NOTE — TELEPHONE ENCOUNTER
Action Requested: Summary for Provider     []  Critical Lab, Recommendations Needed  [] Need Additional Advice  []   FYI    []   Need Orders  [] Need Medications Sent to Pharmacy  []  Other     SUMMARY: Pt reports back is sore, unknown cause. Denies recent falls, injuries. Denies urinary symptoms. Pt has not tried OTC pain medication.  Pt would like to be seen. No appointments with Dr. Calhoun.  Appointment scheduled with DAVID Pinedo.  Pt concerned insurance will not cover another provider.  Pt informed upon scheduling appointment, noted no copy with medicare.  Pt verbalized understanding.    Reason for call: Back Pain (1 month)  Onset: Data Unavailable                     Reason for Disposition   Age > 50 and no history of prior similar back pain    Protocols used: Back Pain-A-[place]    Future Appointments   Date Time Provider Department Center   4/18/2024  1:20 PM Constance Carlos APRN [facility]

## 2024-05-01 ENCOUNTER — LAB ENCOUNTER (OUTPATIENT)
Dept: LAB | Facility: REFERENCE LAB | Age: 69
End: 2024-05-01
Attending: INTERNAL MEDICINE
Payer: MEDICARE

## 2024-05-01 LAB
ALBUMIN SERPL-MCNC: 4.2 G/DL (ref 3.2–4.8)
ALBUMIN/GLOB SERPL: 1.4 {RATIO} (ref 1–2)
ALP LIVER SERPL-CCNC: 107 U/L
ALT SERPL-CCNC: 28 U/L
ANION GAP SERPL CALC-SCNC: 1 MMOL/L (ref 0–18)
AST SERPL-CCNC: 28 U/L (ref ?–34)
BILIRUB SERPL-MCNC: 0.5 MG/DL (ref 0.2–1.1)
BUN BLD-MCNC: 14 MG/DL (ref 9–23)
BUN/CREAT SERPL: 15.1 (ref 10–20)
CALCIUM BLD-MCNC: 9 MG/DL (ref 8.7–10.4)
CHLORIDE SERPL-SCNC: 107 MMOL/L (ref 98–112)
CHOLEST SERPL-MCNC: 154 MG/DL (ref ?–200)
CO2 SERPL-SCNC: 29 MMOL/L (ref 21–32)
CREAT BLD-MCNC: 0.93 MG/DL
CREAT UR-SCNC: 82.3 MG/DL
DEPRECATED RDW RBC AUTO: 38.9 FL (ref 35.1–46.3)
EGFRCR SERPLBLD CKD-EPI 2021: 67 ML/MIN/1.73M2 (ref 60–?)
ERYTHROCYTE [DISTWIDTH] IN BLOOD BY AUTOMATED COUNT: 12.9 % (ref 11–15)
EST. AVERAGE GLUCOSE BLD GHB EST-MCNC: 160 MG/DL (ref 68–126)
FASTING PATIENT LIPID ANSWER: YES
FASTING STATUS PATIENT QL REPORTED: YES
GLOBULIN PLAS-MCNC: 2.9 G/DL (ref 2.8–4.4)
GLUCOSE BLD-MCNC: 109 MG/DL (ref 70–99)
HBA1C MFR BLD: 7.2 % (ref ?–5.7)
HCT VFR BLD AUTO: 42.3 %
HDLC SERPL-MCNC: 47 MG/DL (ref 40–59)
HGB BLD-MCNC: 14.1 G/DL
LDLC SERPL CALC-MCNC: 73 MG/DL (ref ?–100)
MCH RBC QN AUTO: 27.3 PG (ref 26–34)
MCHC RBC AUTO-ENTMCNC: 33.3 G/DL (ref 31–37)
MCV RBC AUTO: 82 FL
MICROALBUMIN UR-MCNC: 0.9 MG/DL
MICROALBUMIN/CREAT 24H UR-RTO: 10.9 UG/MG (ref ?–30)
NONHDLC SERPL-MCNC: 107 MG/DL (ref ?–130)
OSMOLALITY SERPL CALC.SUM OF ELEC: 285 MOSM/KG (ref 275–295)
PLATELET # BLD AUTO: 318 10(3)UL (ref 150–450)
POTASSIUM SERPL-SCNC: 4.3 MMOL/L (ref 3.5–5.1)
PROT SERPL-MCNC: 7.1 G/DL (ref 5.7–8.2)
RBC # BLD AUTO: 5.16 X10(6)UL
SODIUM SERPL-SCNC: 137 MMOL/L (ref 136–145)
TRIGL SERPL-MCNC: 206 MG/DL (ref 30–149)
TSI SER-ACNC: 2.87 MIU/ML (ref 0.55–4.78)
VLDLC SERPL CALC-MCNC: 32 MG/DL (ref 0–30)
WBC # BLD AUTO: 6.3 X10(3) UL (ref 4–11)

## 2024-05-01 PROCEDURE — 82570 ASSAY OF URINE CREATININE: CPT | Performed by: INTERNAL MEDICINE

## 2024-05-01 PROCEDURE — 36415 COLL VENOUS BLD VENIPUNCTURE: CPT | Performed by: INTERNAL MEDICINE

## 2024-05-01 PROCEDURE — 80061 LIPID PANEL: CPT | Performed by: INTERNAL MEDICINE

## 2024-05-01 PROCEDURE — 82043 UR ALBUMIN QUANTITATIVE: CPT | Performed by: INTERNAL MEDICINE

## 2024-05-01 PROCEDURE — 84443 ASSAY THYROID STIM HORMONE: CPT | Performed by: INTERNAL MEDICINE

## 2024-05-01 PROCEDURE — 85027 COMPLETE CBC AUTOMATED: CPT | Performed by: INTERNAL MEDICINE

## 2024-05-01 PROCEDURE — 80053 COMPREHEN METABOLIC PANEL: CPT | Performed by: INTERNAL MEDICINE

## 2024-05-01 PROCEDURE — 83036 HEMOGLOBIN GLYCOSYLATED A1C: CPT | Performed by: INTERNAL MEDICINE

## 2024-05-09 RX ORDER — DULAGLUTIDE 0.75 MG/.5ML
0.75 INJECTION, SOLUTION SUBCUTANEOUS WEEKLY
Qty: 6 EACH | Refills: 3 | Status: SHIPPED | OUTPATIENT
Start: 2024-05-09

## 2024-05-10 NOTE — TELEPHONE ENCOUNTER
Refill passed per [facility] protocol.     Requested Prescriptions   Pending Prescriptions Disp Refills    TRULICITY 0.75 MG/0.5ML Subcutaneous Solution Pen-injector [Pharmacy Med Name: TRULICITY 0.75 MG/0.5 ML PEN]  3     Sig: INJECT 0.75 MG SUBCUTANEOUSLY ONE TIME PER WEEK       Diabetes Medication Protocol Passed - 5/9/2024 12:13 AM        Passed - Last A1C < 7.5 and within past 6 months     Lab Results   Component Value Date    A1C 7.2 (H) 05/01/2024             Passed - In person appointment or virtual visit in the past 6 mos or appointment in next 3 mos     Recent Outpatient Visits              3 weeks ago Rash of back    [facility] Constance Carlos, APRN    Office Visit    3 weeks ago     [facility] [place], PT    Office Visit    4 weeks ago     [facility] [place] hebert, PT    Office Visit    1 month ago     [facility] [place], PT    Office Visit    1 month ago     [facility] [place]bert, PT    Office Visit          Future Appointments         Provider Department Appt Notes    In 1 month Gina Frazier MD [facility] skin check    In 2 months Hadley Calhoun MD [facility] 5M FU    In 3 months Chriss Kenney MD [facility] Yearly eye exam                    Passed - Microalbumin procedure in past 12 months or taking ACE/ARB        Passed - EGFRCR or GFRNAA > 50     GFR Evaluation  EGFRCR: 67 , resulted on 5/1/2024          Passed - GFR in the past 12 months              Future Appointments         Provider Department Appt Notes    In 1 month Gina Frazier MD [facility] skin check    In 2 months Hadley Calhoun MD [facility]  [place], [place] 5M FU    In 3 months Chriss Kenney MD [facility] Yearly eye exam             Recent Outpatient Visits              3 weeks ago Rash of back    [facility], [facility] Constance Carlos, APRN    Office Visit    3 weeks ago     [facility] in [place], PT    Office Visit    4 weeks ago     [facility] [place] Taylor, PT    Office Visit    1 month ago     [facility] [place] Taylor, PT    Office Visit    1 month ago     [facility] [place] Taylor, PT    Office Visit

## 2024-05-16 NOTE — TELEPHONE ENCOUNTER
Refill Per Protocol     Requested Prescriptions   Pending Prescriptions Disp Refills    BD PEN NEEDLE CARY 2ND GEN 32G X 4 MM Does not apply Misc [Pharmacy Med Name: BD CARY 2 GEN PEN NDL 32G 4MM]  3     Sig: Use as directed twice a day       Diabetic Supplies Protocol Passed - 5/15/2024  7:39 PM        Passed - In person appointment or virtual visit in the past 12 mos or appointment in next 3 mos     Recent Outpatient Visits              4 weeks ago [facility] Constance Calros, APRN    Office Visit    1 month ago     [facility] [place], [place], PT    Office Visit    1 month ago     [facility] [place], [place], PT    Office Visit    1 month ago     [facility] [place], [place], PT    Office Visit    1 month ago     [facility] [place], PT    Office Visit          Future Appointments         Provider Department Appt Notes    In 1 month Gina Frazier MD [facility] skin check    In 2 months Hadley Calhoun MD [facility] 5M FU    In 3 months Chriss Kenney MD [facility] Yearly eye exam                      NovoLOG Mix 70/30 FlexPen 100 UNIT/ML Susp Pen-injector (Insulin Aspart Prot & Aspart 70/30) [Pharmacy Med Name: NOVOLOG MIX 70-30 FLEXPEN]  7     Sig: INJECT 34 UNITS SUBCUTANEOUS IN THE MORNING AND 24 UNITS AT NIGHT.       Diabetes Medication Protocol Passed - 5/15/2024  7:39 PM        Passed - Last A1C < 7.5 and within past 6 months     Lab Results   Component Value Date    A1C 7.2 (H) 05/01/2024             Passed - In person appointment or virtual visit in the past 6 mos or appointment in next 3 mos     Recent Outpatient Visits              4 weeks ago [facility]  [place], [place] Constance Carlos APRN    Office Visit    1 month ago     [facility] in [place], PT    Office Visit    1 month ago     [facility] [place] hebert, PT    Office Visit    1 month ago     [facility] [place] hebert, PT    Office Visit    1 month ago     [facility] [place] hebert, PT    Office Visit          Future Appointments         Provider Department Appt Notes    In 1 month Gina Frazier MD [facility] skin check    In 2 months Hadley Calhoun MD [facility] 5M FU    In 3 months Chriss Kenney MD [place] Yearly eye exam                    Passed - Microalbumin procedure in past 12 months or taking ACE/ARB        Passed - EGFRCR or GFRNAA > 50     GFR Evaluation  EGFRCR: 67 , resulted on 5/1/2024          Passed - GFR in the past 12 months          AMLODIPINE 10 MG Oral Tab [Pharmacy Med Name: AMLODIPINE BESYLATE 10 MG TAB] 90 tablet 3     Sig: TAKE 1 TABLET BY MOUTH EVERY DAY       Hypertension Medications Protocol Passed - 5/15/2024  7:39 PM        Passed - CMP or BMP in past 12 months        Passed - Last BP reading less than 140/90     BP Readings from Last 1 Encounters:   04/18/24 120/58               Passed - In person appointment or virtual visit in the past 12 mos or appointment in next 3 mos     Recent Outpatient Visits              4 weeks ago Rash of back    [facility], Constance Pringle APRN    Office Visit    1 month ago     [facility] [place], PT    Office Visit    1 month ago     [facility] [place] hebert, PT    Office Visit    1 month ago     [facility] [place] hebert, PT    Office Visit    1 month ago      [facility] [place] Taylor, PT    Office Visit          Future Appointments         Provider Department Appt Notes    In 1 month Gina Frazier MD [facility] [place] skin check    In 2 months Hadley Calhoun MD [facility] [place] 5M FU    In 3 months Chriss Kenney MD [facility] [place] Yearly eye exam                    Passed - EGFRCR or GFRNAA > 50     GFR Evaluation  EGFRCR: 67 , resulted on 5/1/2024                 Future Appointments         Provider Department Appt Notes    In 1 month Gina Frazier MD [facility] [place] skin check    In 2 months Hadley Calhoun MD [facility] [place] 5M FU    In 3 months Chriss Kenney MD [facility] [place] Yearly eye exam          Recent Outpatient Visits              4 weeks ago Rash of back    [facility] [place] Constance Carlos APRN    Office Visit    1 month ago     [facility] [place] hebert, PT    Office Visit    1 month ago     [facility] [place] Taylor, PT    Office Visit    1 month ago     [facility] [place] hebert, PT    Office Visit    1 month ago     [facility] [place] hebert, PT    Office Visit

## 2024-05-17 RX ORDER — PEN NEEDLE, DIABETIC 32GX 5/32"
NEEDLE, DISPOSABLE MISCELLANEOUS
Qty: 200 EACH | Refills: 3 | Status: SHIPPED | OUTPATIENT
Start: 2024-05-17

## 2024-05-17 RX ORDER — INSULIN ASPART 100 [IU]/ML
INJECTION, SUSPENSION SUBCUTANEOUS
Qty: 51 ML | Refills: 3 | Status: SHIPPED | OUTPATIENT
Start: 2024-05-17

## 2024-05-17 RX ORDER — AMLODIPINE BESYLATE 10 MG/1
10 TABLET ORAL DAILY
Qty: 90 TABLET | Refills: 3 | Status: SHIPPED | OUTPATIENT
Start: 2024-05-17

## 2024-05-17 NOTE — TELEPHONE ENCOUNTER
Refill passed per [facility] protocol.  Requested Prescriptions   Pending Prescriptions Disp Refills    Insulin Pen Needle (BD PEN NEEDLE CARY 2ND GEN) 32G X 4 MM Does not apply Misc [Pharmacy Med Name: BD CARY 2 GEN PEN NDL 32G 4MM] 200 each 3     Sig: USE AS DIRECTED TWICE A DAY       Diabetic Supplies Protocol Passed - 5/15/2024  7:39 PM        Passed - In person appointment or virtual visit in the past 12 mos or appointment in next 3 mos     Recent Outpatient Visits              4 weeks ago Rash of back    [facility] Constance Carlos, APRN    Office Visit    1 month ago     [facility] [place], PT    Office Visit    1 month ago     [facility] [place], PT    Office Visit    1 month ago     [facility] [place], PT    Office Visit    1 month ago     [facility] [place], PT    Office Visit          Future Appointments         Provider Department Appt Notes    In 1 month Gina Frazier MD [facility] skin check    In 2 months Hadley Calhoun MD [facility] 5M FU    In 3 months Chriss Kenney MD [facility] Yearly eye exam                      NovoLOG Mix 70/30 FlexPen 100 UNIT/ML Susp Pen-injector (Insulin Aspart Prot & Aspart 70/30) [Pharmacy Med Name: NOVOLOG MIX 70-30 FLEXPEN] 17 mL 3     Sig: INJECT 34 UNITS SUBCUTANEOUS IN THE MORNING AND 24 UNITS AT NIGHT.       Diabetes Medication Protocol Passed - 5/15/2024  7:39 PM        Passed - Last A1C < 7.5 and within past 6 months     Lab Results   Component Value Date    A1C 7.2 (H) 05/01/2024             Passed - In person appointment or virtual visit in the past 6 mos or appointment in next 3 mos     Recent Outpatient Visits              4 weeks ago Rash  of back    [facility] Con[facility] APR[facility]    Office Visit    1 month ago     [facility] [place], PT    Office Visit    1 month ago     [facility] [place], PT    Office Visit    1 month ago     [facility] [place], PT    Office Visit    1 month ago     [facility] [place], PT    Office Visit          Future Appointments         Provider Department Appt Notes    In 1 month Gina Frazier MD [facility] skin check    In 2 months Hadley Calhoun MD [facility] 5M FU    In 3 months Chriss Kenney MD [facility] Yearly eye exam                    Passed - Microalbumin procedure in past 12 months or taking ACE/ARB        Passed - EGFRCR or GFRNAA > 50     GFR Evaluation  EGFRCR: 67 , resulted on 5/1/2024          Passed - GFR in the past 12 months          AMLODIPINE 10 MG Oral Tab [Pharmacy Med Name: AMLODIPINE BESYLATE 10 MG TAB] 90 tablet 3     Sig: TAKE 1 TABLET BY MOUTH EVERY DAY       Hypertension Medications Protocol Passed - 5/15/2024  7:39 PM        Passed - CMP or BMP in past 12 months        Passed - Last BP reading less than 140/90     BP Readings from Last 1 Encounters:   04/18/24 120/58               Passed - In person appointment or virtual visit in the past 12 mos or appointment in next 3 mos     Recent Outpatient Visits              4 weeks ago Rash of back    [place] [place] Constance Carlos, APRN    Office Visit    1 month ago     [facility] [place], PT    Office Visit    1 month ago     [facility] [place], PT    Office Visit    1 month ago     [facility] [place],  Th[place], PT    Office Visit    1 month ago     [facility] [place], PT    Office Visit          Future Appointments         Provider Department Appt Notes    In 1 month Gina Frazier MD [facility] [place] skin check    In 2 months Hadley Calhoun MD [facility] [place] 5M FU    In 3 months Chriss Kenney MD [facility] [place] Yearly eye exam                    Passed - EGFRCR or GFRNAA > 50     GFR Evaluation  EGFRCR: 67 , resulted on 5/1/2024             Future Appointments         Provider Department Appt Notes    In 1 month Gina Frazier MD [facility] [place] skin check    In 2 months Hadley Calhoun MD [facility] [place] 5M FU    In 3 months Chriss Kenney MD [facility] [place] Yearly eye exam          Recent Outpatient Visits              4 weeks ago Rash of back    [facility] [place] Constance Carlos APRN    Office Visit    1 month ago     [facility] [place] hebert, PT    Office Visit    1 month ago     [facility] [place] hebert, PT    Office Visit    1 month ago     [facility] [place] hebert, PT    Office Visit    1 month ago     [facility] [place] hebert, PT    Office Visit

## 2024-05-20 ENCOUNTER — OFFICE VISIT (OUTPATIENT)
Dept: INTERNAL MEDICINE CLINIC | Facility: CLINIC | Age: 69
End: 2024-05-20

## 2024-05-20 VITALS
DIASTOLIC BLOOD PRESSURE: 60 MMHG | HEART RATE: 82 BPM | HEIGHT: 62 IN | WEIGHT: 146.19 LBS | TEMPERATURE: 99 F | BODY MASS INDEX: 26.9 KG/M2 | SYSTOLIC BLOOD PRESSURE: 148 MMHG

## 2024-05-20 DIAGNOSIS — J06.9 ACUTE URI: Primary | ICD-10-CM

## 2024-05-20 PROCEDURE — 99213 OFFICE O/P EST LOW 20 MIN: CPT | Performed by: INTERNAL MEDICINE

## 2024-05-20 RX ORDER — CLOTRIMAZOLE 1 %
1 CREAM (GRAM) TOPICAL 2 TIMES DAILY
Qty: 15 G | Refills: 0 | Status: SHIPPED | OUTPATIENT
Start: 2024-05-20

## 2024-05-20 RX ORDER — BENZONATATE 100 MG/1
100 CAPSULE ORAL 3 TIMES DAILY PRN
Qty: 20 CAPSULE | Refills: 0 | Status: SHIPPED | OUTPATIENT
Start: 2024-05-20

## 2024-05-20 RX ORDER — LEVOTHYROXINE SODIUM 0.05 MG/1
50 TABLET ORAL
Qty: 90 TABLET | Refills: 3 | Status: SHIPPED | OUTPATIENT
Start: 2024-05-20

## 2024-05-20 NOTE — PATIENT INSTRUCTIONS
Please perform a COVID test at home and contact us with results  Please get extra rest, drink extra fluid, use Tylenol pseudoephedrine and warm salt water gargles as needed  Take benzonatate 100 mg 3 times daily as needed for coughing  Call if not better

## 2024-05-20 NOTE — PROGRESS NOTES
Charlotte Hernández is a 68 year old female.   Chief Complaint   Patient presents with    Cough    Sore Throat     HPI:   Since Saturday, 2 days ago, she has had symptoms of headache, nasal congestion with clear drainage, sneezing, sore throat and cough productive of clear sputum.  Yesterday she had a fever of 101 degrees, now resolved.  No purulent nasal drainage, sinus pressure or pain, ear pain or shortness of breath.  She has not done a home COVID test.    History of type 2 diabetes, hypertension, hypothyroidism and dyslipidemia.  Medications reviewed, as listed below.  No medication allergies.  Current Outpatient Medications   Medication Sig Dispense Refill    Insulin Pen Needle (BD PEN NEEDLE CARY 2ND GEN) 32G X 4 MM Does not apply Misc Use as directed twice a day 200 each 3    NovoLOG Mix 70/30 FlexPen 100 UNIT/ML Susp Pen-injector (Insulin Aspart Prot & Aspart 70/30) INJECT 34 UNITS SUBCUTANEOUS IN THE MORNING AND 24 UNITS AT NIGHT. 51 mL 3    amLODIPine 10 MG Oral Tab Take 1 tablet (10 mg total) by mouth daily. 90 tablet 3    Dulaglutide (TRULICITY) 0.75 MG/0.5ML Subcutaneous Solution Pen-injector Inject 0.75 mg into the skin once a week. 6 each 3    clotrimazole 1 % External Cream Apply 1 Application topically 2 (two) times daily. 1 each 0    metoprolol succinate ER 25 MG Oral Tablet 24 Hr Take 1 tablet (25 mg total) by mouth daily. 90 tablet 3    atorvastatin 10 MG Oral Tab Take 1 tablet (10 mg total) by mouth nightly. 90 tablet 1    OneTouch Delica Lancets 33G Does not apply Misc 1 each 2 (two) times daily. 200 each 1    pantoprazole 40 MG Oral Tab EC Take 1 tablet (40 mg total) by mouth before breakfast.      Hydroquinone 4 % External Cream 2 times daily when needed 28.35 g 0    metFORMIN  MG Oral Tablet 24 Hr Take 2 tablets (1,000 mg total) by mouth daily with dinner. 180 tablet 3    Glucose Blood (ONETOUCH ULTRA) In Vitro Strip 1 each by Other route in the morning and 1 each before bedtime. 200 strip  3    magnesium oxide 400 MG Oral Tab Take 1 tablet (400 mg total) by mouth daily. 90 tablet 3    hydrOXYzine 10 MG Oral Tab Take 1 tablet (10 mg total) by mouth every other day. 45 tablet 3    losartan 100 MG Oral Tab Take 1 tablet (100 mg total) by mouth daily. 90 tablet 3    levothyroxine 50 MCG Oral Tab Take 1 tablet (50 mcg total) by mouth before breakfast. 90 tablet 3    betamethasone dipropionate 0.05 % External Cream Apply 1 Application topically 2 (two) times daily. 15 g 0     No Known Allergies   Past Medical History:    Anxiety state    Asthmatic bronchitis without complication (HCC)    BPV (benign positional vertigo)    Carpal tunnel syndrome    Disorder of thyroid    Diverticular disease    DUB (dysfunctional uterine bleeding)    Endometrial biopsy in     Gastritis    Helicobacter pylori infection    EGD with Biopsy in     High blood pressure    History of pregnancy     in  and ,  in     History of     Hyperlipemia    Observation for suspected condition    Osteoarthritis    Pap smear, as part of routine gynecological examination    Right shoulder tendinitis    Routine physical examination    Screen for colon cancer    repeat CLN in 10 years    Sleep apnea    Syncope and collapse    Type II or unspecified type diabetes mellitus without mention of complication, not stated as uncontrolled    Visual impairment    Readers      Social History:  Social History     Socioeconomic History    Marital status:    Tobacco Use    Smoking status: Never    Smokeless tobacco: Never   Vaping Use    Vaping status: Never Used   Substance and Sexual Activity    Alcohol use: No     Alcohol/week: 0.0 standard drinks of alcohol    Drug use: No    Sexual activity: Not Currently     Partners: Male   Other Topics Concern    Caffeine Concern Yes     Comment: Tea, Coffee 2 cups daily;     Exercise Yes    Pt has a pacemaker No    Reaction to local anesthetic No   Social History Narrative     The patient does not use an assistive device..      The patient does live in a home with stairs.     Social Determinants of Health      [facility] [employer]        EXAM:   GENERAL: Pleasant female appearing well and breathing comfortably in no distress  /60   Pulse 82   Temp 98.6 °F (37 °C) (Oral)   Ht 5' 2\" (1.575 m)   Wt 146 lb 3.2 oz (66.3 kg)   LMP  (LMP Unknown)   BMI 26.74 kg/m²   HEENT: Anicteric, conjunctiva pink, canals and TMs normal bilaterally, no maxillary or frontal sinus tenderness, oropharynx normal without erythema ulceration swelling or exudate  NECK: Supple without mass or lymphadenopathy  LUNGS: Resonant to percussion and clear to auscultation without crackles or wheezes    ASSESSMENT AND PLAN:   1. Acute URI  Likely viral.  Possibly COVID  Recommend home COVID test and asked that she notify us of results.  Consider Paxlovid if COVID test positive  Symptomatic treatment-extra rest, push fluids, Tylenol as needed, pseudoephedrine as needed, warm salt water gargles as needed  Benzonatate 100 mg 3 times daily as needed for coughing.  Prescription sent to pharmacy.  Call if not soon better.    The patient indicates understanding of these issues and agrees to the plan.    Kaushal Painter MD  5/20/2024  11:08 AM

## 2024-05-20 NOTE — TELEPHONE ENCOUNTER
Please review.  Protocol failed / Has no protocol.     Requested Prescriptions   Pending Prescriptions Disp Refills    CLOTRIMAZOLE 1 % External Cream [Pharmacy Med Name: CLOTRIMAZOLE 1% TOPICAL CREAM] 15 g 0     Sig: APPLY TO AFFECTED AREA TWICE A DAY       There is no refill protocol information for this order        Future Appointments         Provider Department Appt Notes    In 1 month Gina Frazier MD [facility] skin check    In 2 months Hadley Calhoun MD [facility] 5M FU    In 3 months Chriss Kenney MD [facility] Yearly eye exam          Recent Outpatient Visits              Today     [facility] Kaushal Painter MD    Office Visit    1 month ago Rash of back    [facility] Constance Carlos APRN    Office Visit    1 month ago     [facility] [place]    Office Visit    1 month ago     [facility] [place] [place]    Office Visit    1 month ago     [facility] [place]    Office Visit

## 2024-05-20 NOTE — TELEPHONE ENCOUNTER
Refill passed per [facility] protocols.    Requested Prescriptions   Pending Prescriptions Disp Refills    LEVOTHYROXINE 50 MCG Oral Tab [Pharmacy Med Name: LEVOTHYROXINE 50 MCG TABLET] 90 tablet 3     Sig: TAKE 1 TABLET BY MOUTH BEFORE BREAKFAST.       Thyroid Medication Protocol Passed - 5/20/2024 12:09 AM        Passed - TSH in past 12 months        Passed - Last TSH value is normal     Lab Results   Component Value Date    TSH 2.871 05/01/2024    THYROIDFUNC 2.16 10/17/2015                 Passed - In person appointment or virtual visit in the past 12 mos or appointment in next 3 mos     Recent Outpatient Visits              Today     [place] Kaushal Painter MD    Office Visit    1 month ago Rash of back    [facility]    Office Visit    1 month ago     [facility] [place], PT    Office Visit    1 month ago     [facility] [place], PT    Office Visit    1 month ago     [facility] [place], PT    Office Visit          Future Appointments         Provider Department Appt Notes    In 1 month Gina Frazier MD [facility] skin check    In 2 months Hadley Calhoun MD [facility] 5M FU    In 3 months Chriss Kenney MD [facility] Yearly eye exam

## 2024-06-10 RX ORDER — LOSARTAN POTASSIUM 100 MG/1
100 TABLET ORAL DAILY
Qty: 90 TABLET | Refills: 3 | Status: SHIPPED | OUTPATIENT
Start: 2024-06-10

## 2024-06-10 RX ORDER — MAGNESIUM OXIDE 400 MG/1
1 TABLET ORAL DAILY
Qty: 90 TABLET | Refills: 3 | Status: SHIPPED | OUTPATIENT
Start: 2024-06-10

## 2024-06-10 NOTE — TELEPHONE ENCOUNTER
Please review. Protocol Failed; No Protocol    Requested Prescriptions   Pending Prescriptions Disp Refills    LOSARTAN 100 MG Oral Tab [Pharmacy Med Name: LOSARTAN POTASSIUM 100 MG TAB] 90 tablet 3     Sig: TAKE 1 TABLET BY MOUTH EVERY DAY       Hypertension Medications Protocol Failed - 6/5/2024 10:06 AM        Failed - Last BP reading less than 140/90     BP Readings from Last 1 Encounters:   05/20/24 148/60               Passed - CMP or BMP in past 12 months        Passed - In person appointment or virtual visit in the past 12 mos or appointment in next 3 mos     Recent Outpatient Visits              3 weeks ago Acute URI    [facility] Kaushal Painter MD    Office Visit    1 month ago Rash of back    [facility] Constance Carlos APRN    Office Visit    1 month ago     [facility] [place], PT    Office Visit    2 months ago     [facility] [place], PT    Office Visit    2 months ago     [facility] [place], PT    Office Visit          Future Appointments         Provider Department Appt Notes    In 1 week Gina Frazier MD [facility] skin check    In 1 month Hadley Calhoun MD [facility] 5M FU    In 2 months Chriss Kenney MD [facility] Yearly eye exam                    Passed - EGFRCR or GFRNAA > 50     GFR Evaluation  EGFRCR: 67 , resulted on 5/1/2024           Signed Prescriptions Disp Refills    MAGNESIUM OXIDE 400 MG Oral Tab 90 tablet 3     Sig: TAKE 1 TABLET BY MOUTH EVERY DAY       Gastrointestional Medication Protocol Passed - 6/5/2024 10:06 AM        Passed - In person appointment or virtual visit in the past 12 mos or appointment in next 3 mos     Recent Outpatient Visits               3 weeks ago Acute URI    [facility] [place] Kaushal Painter MD    Office Visit    1 month ago Rash of back    [facility], [facility] [place] Amirah Carlosna, APRN    Office Visit    1 month ago     [facility] [place], PT    Office Visit    2 months ago     [facility] [place], PT    Office Visit    2 months ago     [facility] [place], PT    Office Visit          Future Appointments         Provider Department Appt Notes    In 1 week Gina Frazier MD [facility] [place] skin check    In 1 month Hadley Calhoun MD [facility] [place] 5M FU    In 2 months Chriss Kenney MD [facility] [place] Yearly eye exam                           Future Appointments         Provider Department Appt Notes    In 1 week Gina Frazier MD [facility] [place] skin check    In 1 month Hadley Calhoun MD [facility] [place] 5M FU    In 2 months Chriss Kenney MD [facility] [place] Yearly eye exam          Recent Outpatient Visits              3 weeks ago Acute URI    [facility] [place] Kaushal Painter MD    Office Visit    1 month ago Rash of back    [facility], [facility] [place] Constance Carlos, APRN    Office Visit    1 month ago     [facility] [place], PT    Office Visit    2 months ago     [facility] [place], PT    Office Visit    2 months ago     [facility] [place], PT    Office Visit

## 2024-06-11 ENCOUNTER — TELEPHONE (OUTPATIENT)
Dept: INTERNAL MEDICINE CLINIC | Facility: CLINIC | Age: 69
End: 2024-06-11

## 2024-06-11 NOTE — TELEPHONE ENCOUNTER
Spoke with the patient,verified full name and     Asking for refill on Levothyroxine    Upon chart review script was sent to [facility]    Advised her to reach out to pharmacy.    No further questions

## 2024-06-19 ENCOUNTER — OFFICE VISIT (OUTPATIENT)
Dept: DERMATOLOGY CLINIC | Facility: CLINIC | Age: 69
End: 2024-06-19

## 2024-06-19 DIAGNOSIS — L30.9 DERMATITIS: Primary | ICD-10-CM

## 2024-06-19 DIAGNOSIS — L81.9 HYPERPIGMENTATION: ICD-10-CM

## 2024-06-19 DIAGNOSIS — D23.9 BENIGN NEOPLASM OF SKIN, UNSPECIFIED LOCATION: ICD-10-CM

## 2024-06-19 DIAGNOSIS — L29.9 PRURITUS: ICD-10-CM

## 2024-06-19 DIAGNOSIS — L81.4 LENTIGO: ICD-10-CM

## 2024-06-19 PROCEDURE — 99203 OFFICE O/P NEW LOW 30 MIN: CPT | Performed by: DERMATOLOGY

## 2024-06-19 RX ORDER — KETOCONAZOLE 20 MG/ML
SHAMPOO TOPICAL
Qty: 120 ML | Refills: 11 | Status: SHIPPED | OUTPATIENT
Start: 2024-06-19

## 2024-06-19 RX ORDER — HYDROQUINONE 40 MG/G
1 CREAM TOPICAL 2 TIMES DAILY
Qty: 30 G | Refills: 1 | Status: SHIPPED | OUTPATIENT
Start: 2024-06-19

## 2024-06-19 RX ORDER — HALOBETASOL PROPIONATE 0.05 %
OINTMENT (GRAM) TOPICAL
Qty: 50 G | Refills: 2 | Status: SHIPPED | OUTPATIENT
Start: 2024-06-19

## 2024-06-20 NOTE — TELEPHONE ENCOUNTER
Please review. Protocol Failed; No Protocol    Requested Prescriptions   Pending Prescriptions Disp Refills    CLOTRIMAZOLE 1 % External Cream [Pharmacy Med Name: CLOTRIMAZOLE 1% TOPICAL CREAM] 15 g 0     Sig: Apply 1 Application topically 2 (two) times daily. APPLY TO AFFECTED AREA       There is no refill protocol information for this order            Future Appointments         Provider Department Appt Notes    In 1 month Hadley Calhoun MD [facility] 5M FU    In 2 months Chriss Kenney MD [facility] Yearly eye exam          Recent Outpatient Visits              Yesterday     [facility] Gina Frazier MD    Office Visit    1 month ago Acute URI    [facility] Kaushal Painter MD    Office Visit    2 months ago Rash of back    [facility] Constance Carlos APRN    Office Visit    2 months ago     [facility] [place] [place]    Office Visit    2 months ago     [facility] [place], [place], [place]    Office Visit

## 2024-06-21 RX ORDER — CLOTRIMAZOLE 1 %
1 CREAM (GRAM) TOPICAL 2 TIMES DAILY
Qty: 15 G | Refills: 0 | Status: SHIPPED | OUTPATIENT
Start: 2024-06-21

## 2024-06-30 NOTE — PROGRESS NOTES
Charlotte Hernández is a 68 year old female.    Chief Complaint   Patient presents with    Full Skin Exam     LOV 3/2021. Pt present w/ spot of concern on upper back x 3months- Itching.             Patient has no known allergies.  Current Outpatient Medications   Medication Sig Dispense Refill    Hydroquinone 4 % External Cream Apply 1 Application topically 2 (two) times daily. Use bid as directed to face 30 g 1    ketoconazole 2 % External Shampoo Apply to scalp 2 times per week. 120 mL 11    Halobetasol Propionate 0.05 % External Ointment Apply a thin layer to affected area on back bid 50 g 2    fluocinonide 0.05 % External Cream Use twice daily on hands as directed 60 g 3    MAGNESIUM OXIDE 400 MG Oral Tab TAKE 1 TABLET BY MOUTH EVERY DAY 90 tablet 3    losartan 100 MG Oral Tab Take 1 tablet (100 mg total) by mouth daily. 90 tablet 3    levothyroxine 50 MCG Oral Tab Take 1 tablet (50 mcg total) by mouth before breakfast. 90 tablet 3    benzonatate 100 MG Oral Cap Take 1 capsule (100 mg total) by mouth 3 (three) times daily as needed. 20 capsule 0    Insulin Pen Needle (BD PEN NEEDLE CARY 2ND GEN) 32G X 4 MM Does not apply Misc Use as directed twice a day 200 each 3    NovoLOG Mix 70/30 FlexPen 100 UNIT/ML Susp Pen-injector (Insulin Aspart Prot & Aspart 70/30) INJECT 34 UNITS SUBCUTANEOUS IN THE MORNING AND 24 UNITS AT NIGHT. 51 mL 3    amLODIPine 10 MG Oral Tab Take 1 tablet (10 mg total) by mouth daily. 90 tablet 3    Dulaglutide (TRULICITY) 0.75 MG/0.5ML Subcutaneous Solution Pen-injector Inject 0.75 mg into the skin once a week. 6 each 3    betamethasone dipropionate 0.05 % External Cream Apply 1 Application topically 2 (two) times daily. 15 g 0    metoprolol succinate ER 25 MG Oral Tablet 24 Hr Take 1 tablet (25 mg total) by mouth daily. 90 tablet 3    atorvastatin 10 MG Oral Tab Take 1 tablet (10 mg total) by mouth nightly. 90 tablet 1    OneTouch Delica Lancets 33G Does not apply Misc 1 each 2 (two) times  daily. 200 each 1    pantoprazole 40 MG Oral Tab EC Take 1 tablet (40 mg total) by mouth before breakfast.      Hydroquinone 4 % External Cream 2 times daily when needed 28.35 g 0    metFORMIN  MG Oral Tablet 24 Hr Take 2 tablets (1,000 mg total) by mouth daily with dinner. 180 tablet 3    Glucose Blood (ONETOUCH ULTRA) In Vitro Strip 1 each by Other route in the morning and 1 each before bedtime. 200 strip 3    hydrOXYzine 10 MG Oral Tab Take 1 tablet (10 mg total) by mouth every other day. 45 tablet 3    clotrimazole 1 % External Cream Apply 1 Application topically 2 (two) times daily. APPLY TO AFFECTED AREA 15 g 0      Past Medical History:    Anxiety state    Asthmatic bronchitis without complication (HCC)    BPV (benign positional vertigo)    Carpal tunnel syndrome    Disorder of thyroid    Diverticular disease    DUB (dysfunctional uterine bleeding)    Endometrial biopsy in     Gastritis    Helicobacter pylori infection    EGD with Biopsy in     High blood pressure    History of pregnancy     in  and ,  in     History of     Hyperlipemia    Observation for suspected condition    Osteoarthritis    Pap smear, as part of routine gynecological examination    Right shoulder tendinitis    Routine physical examination    Screen for colon cancer    repeat CLN in 10 years    Sleep apnea    Syncope and collapse    Type II or unspecified type diabetes mellitus without mention of complication, not stated as uncontrolled    Visual impairment    Readers      Social History:  Social History     Socioeconomic History    Marital status:    Tobacco Use    Smoking status: Never    Smokeless tobacco: Never   Vaping Use    Vaping status: Never Used   Substance and Sexual Activity    Alcohol use: No     Alcohol/week: 0.0 standard drinks of alcohol    Drug use: No    Sexual activity: Not Currently     Partners: Male   Other Topics Concern    Caffeine Concern Yes     Comment: Tea,  Coffee 2 cups daily;     Exercise Yes    Pt has a pacemaker No    Reaction to local anesthetic No   Social History Narrative    The patient does not use an assistive device..      The patient does live in a home with stairs.     Social Determinants of Health      [facility]                 Current Outpatient Medications   Medication Sig Dispense Refill    Hydroquinone 4 % External Cream Apply 1 Application topically 2 (two) times daily. Use bid as directed to face 30 g 1    ketoconazole 2 % External Shampoo Apply to scalp 2 times per week. 120 mL 11    Halobetasol Propionate 0.05 % External Ointment Apply a thin layer to affected area on back bid 50 g 2    fluocinonide 0.05 % External Cream Use twice daily on hands as directed 60 g 3    MAGNESIUM OXIDE 400 MG Oral Tab TAKE 1 TABLET BY MOUTH EVERY DAY 90 tablet 3    losartan 100 MG Oral Tab Take 1 tablet (100 mg total) by mouth daily. 90 tablet 3    levothyroxine 50 MCG Oral Tab Take 1 tablet (50 mcg total) by mouth before breakfast. 90 tablet 3    benzonatate 100 MG Oral Cap Take 1 capsule (100 mg total) by mouth 3 (three) times daily as needed. 20 capsule 0    Insulin Pen Needle (BD PEN NEEDLE CARY 2ND GEN) 32G X 4 MM Does not apply Misc Use as directed twice a day 200 each 3    NovoLOG Mix 70/30 FlexPen 100 UNIT/ML Susp Pen-injector (Insulin Aspart Prot & Aspart 70/30) INJECT 34 UNITS SUBCUTANEOUS IN THE MORNING AND 24 UNITS AT NIGHT. 51 mL 3    amLODIPine 10 MG Oral Tab Take 1 tablet (10 mg total) by mouth daily. 90 tablet 3    Dulaglutide (TRULICITY) 0.75 MG/0.5ML Subcutaneous Solution Pen-injector Inject 0.75 mg into the skin once a week. 6 each 3    betamethasone dipropionate 0.05 % External Cream Apply 1 Application topically 2 (two) times daily. 15 g 0    metoprolol succinate ER 25 MG Oral Tablet 24 Hr Take 1 tablet (25 mg total) by mouth daily. 90 tablet 3    atorvastatin 10 MG Oral Tab Take 1 tablet (10 mg  total) by mouth nightly. 90 tablet 1    OneTouch Delica Lancets 33G Does not apply Misc 1 each 2 (two) times daily. 200 each 1    pantoprazole 40 MG Oral Tab EC Take 1 tablet (40 mg total) by mouth before breakfast.      Hydroquinone 4 % External Cream 2 times daily when needed 28.35 g 0    metFORMIN  MG Oral Tablet 24 Hr Take 2 tablets (1,000 mg total) by mouth daily with dinner. 180 tablet 3    Glucose Blood (ONETOUCH ULTRA) In Vitro Strip 1 each by Other route in the morning and 1 each before bedtime. 200 strip 3    hydrOXYzine 10 MG Oral Tab Take 1 tablet (10 mg total) by mouth every other day. 45 tablet 3    clotrimazole 1 % External Cream Apply 1 Application topically 2 (two) times daily. APPLY TO AFFECTED AREA 15 g 0     Allergies:   No Known Allergies    Past Medical History:    Anxiety state    Asthmatic bronchitis without complication (HCC)    BPV (benign positional vertigo)    Carpal tunnel syndrome    Disorder of thyroid    Diverticular disease    DUB (dysfunctional uterine bleeding)    Endometrial biopsy in     Gastritis    Helicobacter pylori infection    EGD with Biopsy in     High blood pressure    History of pregnancy     in  and ,  in     History of     Hyperlipemia    Observation for suspected condition    Osteoarthritis    Pap smear, as part of routine gynecological examination    Right shoulder tendinitis    Routine physical examination    Screen for colon cancer    repeat CLN in 10 years    Sleep apnea    Syncope and collapse    Type II or unspecified type diabetes mellitus without mention of complication, not stated as uncontrolled    Visual impairment    Readers     Past Surgical History:   Procedure Laterality Date    Adj tiss xfer lid,nos,ear <10sqcm Left 10/15/2021    L ear lobular reconstruction with flap      ,     Carpal tunnel release Right     Cholecystectomy      Colonoscopy      Negative    Colonoscopy &  polypectomy  12/16/2010    Colonoscopy with Polypectomy and Biopsy, per NG    Endometrial bx conjunct w/colposcopy      Upper gi endoscopy,biopsy  2011    EGD with Biopsy     Social History     Socioeconomic History    Marital status:      Spouse name: Not on file    Number of children: Not on file    Years of education: Not on file    Highest education level: Not on file   Occupational History    Not on file   Tobacco Use    Smoking status: Never    Smokeless tobacco: Never   Vaping Use    Vaping status: Never Used   Substance and Sexual Activity    Alcohol use: No     Alcohol/week: 0.0 standard drinks of alcohol    Drug use: No    Sexual activity: Not Currently     Partners: Male   Other Topics Concern     Service Not Asked    Blood Transfusions Not Asked    Caffeine Concern Yes     Comment: Tea, Coffee 2 cups daily;     Occupational Exposure Not Asked    Hobby Hazards Not Asked    Sleep Concern Not Asked    Stress Concern Not Asked    Weight Concern Not Asked    Special Diet Not Asked    Back Care Not Asked    Exercise Yes    Bike Helmet Not Asked    Seat Belt Not Asked    Self-Exams Not Asked    Grew up on a farm Not Asked    History of tanning Not Asked    Outdoor occupation Not Asked    Pt has a pacemaker No    Pt has a defibrillator Not Asked    Breast feeding Not Asked    Reaction to local anesthetic No    Left Handed Not Asked    Right Handed Not Asked    Currently spends a great deal of time in the sun Not Asked    Past Sunlamp Treatments for Acne Not Asked    Hx of Spending Great Deal of Time in Sun Not Asked    Bad sunburns in the past Not Asked    Tanning Salons in the Past Not Asked    Hx of Radiation Treatments Not Asked    Regular use of sun block Not Asked   Social History Narrative    The patient does not use an assistive device..      The patient does live in a home with stairs.     Social Determinants of Health     Financial Resource Strain: Not on file   Food Insecurity:  Not on file   Transportation Needs: Not on file   Physical Activity: Not on file   Stress: Not on file   Social Connections: Not on file   Housing Stability: At Risk (8/18/2023)    [facility] [employer]     Living Situation: Not on file     Housing Problems: Not on file     Family History   Problem Relation Age of Onset    Diabetes Father     Heart Disease Father         Coronary artery disease    Diabetes Mother     Heart Attack Mother 83        MI    Diabetes Sister     Lipids Other         Family H/o: Hyperlipidemia    Glaucoma Neg     Macular degeneration Neg                       HPI :      Chief Complaint   Patient presents with    Full Skin Exam     LOV 3/2021. Pt present w/ spot of concern on upper back x 3months- Itching.     Past notes/ records and appropriate/relevant lab results including pathology and past body maps reviewed. Updated and new information noted in current visit.     Patient with history of generalized dermatitis pruritus.  Topicals helpful previously    Has not tolerated antihistamines particular hydroxyzine in the past Zyrtec okay.  Has noted hyperpigmentation which she is concerned with.    Notes spot on mid back     Labs reviewed  Patient presents with concerns above.  Denies any other systemic complaints.  No fevers, chills, night sweats, photosensitivity, lymph node swelling.  No other skin complaints.  History, medications, allergies as noted.      Nothing new or different no unusual exposures.  No changes in soaps, detergents, skin care products.  No recent travel.  No else at home itching.  No recent illnesses.       ROS:   Denies any other systemic complaints.  No fevers, chills, night sweats, photosensitivity, lymph node swelling.  No other skin complaints.      Physical examination:  Well-developed well-nourished patient alert oriented in no acute distress.      Exam performed, including scalp, head, neck, face,nails, hair, external eyes,  including conjunctival mucosa, eyelids, oral mucosa, external ears, back, chest, abdomen, bilateral arms, bilateral legs, palms.      Hyperpigmented eczematous patch at mid back, splotchy hyperpigmentation over the face, arms back      ASSESSMENT AND PLAN:     Encounter Diagnoses   Name Primary?    Dermatitis Yes    Pruritus     Lentigo     Benign neoplasm of skin, unspecified location          Dermatitis.  With postinflammatory hyperpigmentation likely exacerbated by her medications which are photosensitizing.  Discussed topical skin care, may use hydroquinone for hyperpigmented areas.  Instructions reviewed.  More melasma-like changes on the face.     Continue careful sun protection    Back with likely notalgia paresthetica eczematous patch, may continue higher potency steroid halobetasol,  Fluocinonide on the hands  , More seborrheic dermatitis changes on the scalp face  Ketoconazole shampoo twice weekly  Continue levocetirizine, Zyrtec      For hyperpigmentation may continue hydroquinone use sparingly along with sun protection on the face.  May use this on other areas of hyperpigmentation.      Recheck in 2 to 3 months if not improving. advised using hydroquinone over larger areas.  But this can cause more hypopigmentation surrounding hyperpigmented areas discussed.    Meds in grid.  Skin care instructions reviewed.  The use various products including moisturizers, creams soaps cleansers detergent etc. reviewed with patient.  Potential allergen, irritants reviewed as well.  Pathophysiology reviewed.  Consider Contact allergy in differential.  Consider patch testing.  Patient will let us know how they are doing over the next several weeks.  Await clinical response to above therapy.        No other susupicious lesions on todays  exam.    RTC as noted 2 months if not improving    The patient indicates understanding of these issues and agrees to the plan.  The patient is asked to return as noted in follow-up/  above.    This note was generated using Dragon voice recognition software.  Please contact me regarding any confusion resulting from errors in recognition.    No orders of the defined types were placed in this encounter.      Meds & Refills for this Visit:   Requested Prescriptions     Signed Prescriptions Disp Refills    Hydroquinone 4 % External Cream 30 g 1     Sig: Apply 1 Application topically 2 (two) times daily. Use bid as directed to face    ketoconazole 2 % External Shampoo 120 mL 11     Sig: Apply to scalp 2 times per week.    Halobetasol Propionate 0.05 % External Ointment 50 g 2     Sig: Apply a thin layer to affected area on back bid    fluocinonide 0.05 % External Cream 60 g 3     Sig: Use twice daily on hands as directed       Dermatitis  (primary encounter diagnosis)    No orders of the defined types were placed in this encounter.      Results From Past 48 Hours:  No results found for this or any previous visit (from the past 48 hour(s)).    Meds This Visit:      Imaging Orders:  None     Referral Orders:  No orders of the defined types were placed in this encounter.

## 2024-08-06 ENCOUNTER — OFFICE VISIT (OUTPATIENT)
Facility: CLINIC | Age: 69
End: 2024-08-06

## 2024-08-06 VITALS
SYSTOLIC BLOOD PRESSURE: 132 MMHG | OXYGEN SATURATION: 99 % | HEART RATE: 74 BPM | BODY MASS INDEX: 27.23 KG/M2 | WEIGHT: 148 LBS | HEIGHT: 62 IN | DIASTOLIC BLOOD PRESSURE: 72 MMHG

## 2024-08-06 DIAGNOSIS — I10 ESSENTIAL HYPERTENSION: ICD-10-CM

## 2024-08-06 DIAGNOSIS — Z79.4 DIABETES MELLITUS TYPE 2, INSULIN DEPENDENT (HCC): Primary | ICD-10-CM

## 2024-08-06 DIAGNOSIS — G89.29 CHRONIC INTRACTABLE HEADACHE, UNSPECIFIED HEADACHE TYPE: ICD-10-CM

## 2024-08-06 DIAGNOSIS — E11.9 DIABETES MELLITUS TYPE 2, INSULIN DEPENDENT (HCC): Primary | ICD-10-CM

## 2024-08-06 DIAGNOSIS — E78.2 MIXED HYPERLIPIDEMIA: ICD-10-CM

## 2024-08-06 DIAGNOSIS — R51.9 CHRONIC INTRACTABLE HEADACHE, UNSPECIFIED HEADACHE TYPE: ICD-10-CM

## 2024-08-06 PROBLEM — R21 RASH: Status: RESOLVED | Noted: 2024-04-18 | Resolved: 2024-08-06

## 2024-08-06 PROCEDURE — 99214 OFFICE O/P EST MOD 30 MIN: CPT | Performed by: INTERNAL MEDICINE

## 2024-08-06 PROCEDURE — G2211 COMPLEX E/M VISIT ADD ON: HCPCS | Performed by: INTERNAL MEDICINE

## 2024-08-06 RX ORDER — INSULIN ASPART 100 [IU]/ML
INJECTION, SUSPENSION SUBCUTANEOUS
Qty: 51 ML | Refills: 3 | Status: SHIPPED | OUTPATIENT
Start: 2024-08-06

## 2024-08-06 NOTE — PROGRESS NOTES
Charlotte Hernández is a 68 year old female.  Chief Complaint   Patient presents with    Follow - Up     Has been having neck pain, headache, and right ear pain     HPI:   68-year-old pleasant lady here for follow-up.  She reports that she is doing well however continues to have headache and tinnitus especially in the right ear.  She was seen by ENT and recommended MRI.  She would like to get a CAT scan to make sure everything is okay.  Denies any blurry vision.  Denies any vomiting.  Sugars are running okay.  No hypoglycemic events reported.      Current Outpatient Medications   Medication Sig Dispense Refill    NovoLOG Mix 70/30 FlexPen 100 UNIT/ML Susp Pen-injector (Insulin Aspart Prot & Aspart 70/30) INJECT 34 UNITS SUBCUTANEOUS IN THE MORNING AND 24 UNITS AT NIGHT. 51 mL 3    clotrimazole 1 % External Cream Apply 1 Application topically 2 (two) times daily. APPLY TO AFFECTED AREA 15 g 0    Hydroquinone 4 % External Cream Apply 1 Application topically 2 (two) times daily. Use bid as directed to face 30 g 1    ketoconazole 2 % External Shampoo Apply to scalp 2 times per week. 120 mL 11    Halobetasol Propionate 0.05 % External Ointment Apply a thin layer to affected area on back bid 50 g 2    fluocinonide 0.05 % External Cream Use twice daily on hands as directed 60 g 3    MAGNESIUM OXIDE 400 MG Oral Tab TAKE 1 TABLET BY MOUTH EVERY DAY 90 tablet 3    losartan 100 MG Oral Tab Take 1 tablet (100 mg total) by mouth daily. 90 tablet 3    levothyroxine 50 MCG Oral Tab Take 1 tablet (50 mcg total) by mouth before breakfast. 90 tablet 3    Insulin Pen Needle (BD PEN NEEDLE CARY 2ND GEN) 32G X 4 MM Does not apply Misc Use as directed twice a day 200 each 3    amLODIPine 10 MG Oral Tab Take 1 tablet (10 mg total) by mouth daily. 90 tablet 3    Dulaglutide (TRULICITY) 0.75 MG/0.5ML Subcutaneous Solution Pen-injector Inject 0.75 mg into the skin once a week. 6 each 3    betamethasone dipropionate 0.05 % External Cream Apply 1  Application topically 2 (two) times daily. 15 g 0    metoprolol succinate ER 25 MG Oral Tablet 24 Hr Take 1 tablet (25 mg total) by mouth daily. 90 tablet 3    atorvastatin 10 MG Oral Tab Take 1 tablet (10 mg total) by mouth nightly. 90 tablet 1    OneTouch Delica Lancets 33G Does not apply Misc 1 each 2 (two) times daily. 200 each 1    pantoprazole 40 MG Oral Tab EC Take 1 tablet (40 mg total) by mouth before breakfast.      Hydroquinone 4 % External Cream 2 times daily when needed 28.35 g 0    metFORMIN  MG Oral Tablet 24 Hr Take 2 tablets (1,000 mg total) by mouth daily with dinner. 180 tablet 3    Glucose Blood (ONETOUCH ULTRA) In Vitro Strip 1 each by Other route in the morning and 1 each before bedtime. 200 strip 3    hydrOXYzine 10 MG Oral Tab Take 1 tablet (10 mg total) by mouth every other day. 45 tablet 3      Past Medical History:    Anxiety state    Asthmatic bronchitis without complication (HCC)    BPV (benign positional vertigo)    Carpal tunnel syndrome    Disorder of thyroid    Diverticular disease    DUB (dysfunctional uterine bleeding)    Endometrial biopsy in     Gastritis    Helicobacter pylori infection    EGD with Biopsy in     High blood pressure    History of pregnancy     in  and ,  in     History of     Hyperlipemia    Observation for suspected condition    Osteoarthritis    Pap smear, as part of routine gynecological examination    Right shoulder tendinitis    Routine physical examination    Screen for colon cancer    repeat CLN in 10 years    Sleep apnea    Syncope and collapse    Type II or unspecified type diabetes mellitus without mention of complication, not stated as uncontrolled    Visual impairment    Readers      Past Surgical History:   Procedure Laterality Date    Adj tiss xfer lid,nos,ear <10sqcm Left 10/15/2021    L ear lobular reconstruction with flap      ,     Carpal tunnel release Right     Cholecystectomy       Colonoscopy  2007    Negative    Colonoscopy & polypectomy  12/16/2010    Colonoscopy with Polypectomy and Biopsy, per NG    Endometrial bx conjunct w/colposcopy      Upper gi endoscopy,biopsy  2011    EGD with Biopsy      Social History:  Social History     Socioeconomic History    Marital status:    Tobacco Use    Smoking status: Never    Smokeless tobacco: Never   Vaping Use    Vaping status: Never Used   Substance and Sexual Activity    Alcohol use: No     Alcohol/week: 0.0 standard drinks of alcohol    Drug use: No    Sexual activity: Not Currently     Partners: Male   Other Topics Concern    Caffeine Concern Yes     Comment: Tea, Coffee 2 cups daily;     Exercise Yes    Pt has a pacemaker No    Reaction to local anesthetic No   Social History Narrative    The patient does not use an assistive device..      The patient does live in a home with stairs.     Social Determinants of Health      Received from [employer]    [facility]      Family History   Problem Relation Age of Onset    Diabetes Father     Heart Disease Father         Coronary artery disease    Diabetes Mother     Heart Attack Mother 83        MI    Diabetes Sister     Lipids Other         Family H/o: Hyperlipidemia    Glaucoma Neg     Macular degeneration Neg       No Known Allergies     REVIEW OF SYSTEMS:   Review of Systems   Review of Systems   Constitutional: Negative for activity change, appetite change and fever.   HENT: Negative for congestion and voice change.    Respiratory: Negative for cough and shortness of breath.    Cardiovascular: Negative for chest pain.   Gastrointestinal: Negative for abdominal distention, abdominal pain and vomiting.   Genitourinary: Negative for hematuria.   Skin: Negative for wound.   Psychiatric/Behavioral: Negative for behavioral problems.   Wt Readings from Last 5 Encounters:   08/06/24 148 lb (67.1 kg)   05/20/24 146 lb 3.2 oz (66.3 kg)   04/18/24 146 lb (66.2 kg)    03/04/24 144 lb (65.3 kg)   11/20/23 142 lb (64.4 kg)     Body mass index is 27.07 kg/m².      EXAM:   /72   Pulse 74   Ht 5' 2\" (1.575 m)   Wt 148 lb (67.1 kg)   LMP  (LMP Unknown)   SpO2 99%   BMI 27.07 kg/m²   Physical Exam   Constitutional:       Appearance: Normal appearance.   HENT:      Head: Normocephalic.   Eyes:      Conjunctiva/sclera: Conjunctivae normal.   Cardiovascular:      Rate and Rhythm: Normal rate and regular rhythm.      Heart sounds: Normal heart sounds. No murmur heard.  Pulmonary:      Effort: Pulmonary effort is normal.      Breath sounds: Normal breath sounds. No rhonchi or rales.   Abdominal:      General: Bowel sounds are normal.      Palpations: Abdomen is soft.      Tenderness: There is no abdominal tenderness.   Musculoskeletal:      Cervical back: Neck supple.      Right lower leg: No edema.      Left lower leg: No edema.   Skin:     General: Skin is warm and dry.   Neurological:      General: No focal deficit present.      Mental Status: He is alert and oriented to person, place, and time. Mental status is at baseline.   Psychiatric:         Mood and Affect: Mood normal.         Behavior: Behavior normal.   Pupils are reactive bilaterally.  Tympanic membrane clear bilaterally.    ASSESSMENT AND PLAN:   1. Diabetes mellitus type 2, insulin dependent (HCC)  Lab Results   Component Value Date    A1C 7.2 (H) 05/01/2024    LDL 73 05/01/2024    MICROALBCREA 10.9 05/01/2024      Continue statins.  Up-to-date on foot exam and eye exam.  - Ophthalmology Referral - External    2. Essential hypertension  Lab Results   Component Value Date    CREATSERUM 0.93 05/01/2024    TSH 2.871 05/01/2024     Will continue to monitor blood pressure.  Encouraged patient to avoid salt.  Continue current medical regimen.      3. Mixed hyperlipidemia  Lab Results   Component Value Date    LDL 73 05/01/2024    AST 28 05/01/2024    ALT 28 05/01/2024      Encouraged patient to eat healthy.  Avoid fat  fried foods and increase activity as tolerated.  We will monitor lipid profile and liver function test.      4. Chronic intractable headache, unspecified headache type  I have reviewed the ENT consultation note because of tinnitus.  Now she started to have headaches.  Maybe it is from her cervical spine disease.  Will get CT scan to rule out any masses.  - CT BRAIN OR HEAD (CPT=70450); Future    Plan: As above.  Medication prescribed, CT ordered, referral given.  I will see her back in 4 months.  If there is any worsening pain, she will contact me.      The patient indicates understanding of these issues and agrees to the plan.  No follow-ups on file.    This note was prepared using Dragon Medical voice recognition dictation software. As a result errors may occur. When identified these errors have been corrected. While every attempt is made to correct errors during dictation discrepancies may still exist.

## 2024-08-23 ENCOUNTER — HOSPITAL ENCOUNTER (OUTPATIENT)
Dept: CT IMAGING | Age: 69
Discharge: HOME | End: 2024-08-23
Attending: INTERNAL MEDICINE
Payer: MEDICARE

## 2024-08-23 DIAGNOSIS — R51.9 CHRONIC INTRACTABLE HEADACHE, UNSPECIFIED HEADACHE TYPE: ICD-10-CM

## 2024-08-23 DIAGNOSIS — G89.29 CHRONIC INTRACTABLE HEADACHE, UNSPECIFIED HEADACHE TYPE: ICD-10-CM

## 2024-08-23 PROCEDURE — 70450 CT HEAD/BRAIN W/O DYE: CPT | Performed by: INTERNAL MEDICINE

## 2024-09-03 RX ORDER — ATORVASTATIN CALCIUM 10 MG/1
10 TABLET, FILM COATED ORAL NIGHTLY
Qty: 90 TABLET | Refills: 3 | Status: SHIPPED | OUTPATIENT
Start: 2024-09-03

## 2024-09-03 NOTE — TELEPHONE ENCOUNTER
Refill passed per [facility] protocol  Requested Prescriptions   Pending Prescriptions Disp Refills    ATORVASTATIN 10 MG Oral Tab [Pharmacy Med Name: ATORVASTATIN 10 MG TABLET] 90 tablet 1     Sig: TAKE 1 TABLET BY MOUTH EVERY DAY AT NIGHT       Cholesterol Medication Protocol Passed - 8/31/2024  9:48 AM        Passed - ALT < 80     Lab Results   Component Value Date    ALT 28 05/01/2024             Passed - ALT resulted within past year        Passed - Lipid panel within past 12 months     Lab Results   Component Value Date    CHOLEST 154 05/01/2024    TRIG 206 (H) 05/01/2024    HDL 47 05/01/2024    LDL 73 05/01/2024    VLDL 32 (H) 05/01/2024    TCHDLRATIO 3.0 09/28/2022    NONHDLC 107 05/01/2024             Passed - In person appointment or virtual visit in the past 12 mos or appointment in next 3 mos     Recent Outpatient Visits              4 weeks ago Diabetes mellitus type 2, insulin dependent (HCC)    [facility] Hadley Calhoun MD    Office Visit    2 months ago Dermatitis    [facility] Gina Frazier MD    Office Visit    3 months ago Acute URI    [facility] Kaushal Painter MD    Office Visit    4 months ago Rash of back    [facility] Constance Carlos APRN    Office Visit    4 months ago     [facility] [place] Brandon ThaliaRO thompson    Office Visit          Future Appointments         Provider Department Appt Notes    In 4 months Hadley Calhoun MD [facility]                        Recent Outpatient Visits              4 weeks ago Diabetes mellitus type 2, insulin dependent (HCC)    [facility] Hadley Calhoun MD    Office Visit    2 months ago Dermatitis    [facility]  Gina Frazier MD    Office Visit    3 months ago Acute URI    [facility] Kaushal Painter MD    Office Visit    4 months ago Rash of back    [facility] [place] Constance Carlos APRN    Office Visit    4 months ago     [facility] [place]    Office Visit          Future Appointments         Provider Department Appt Notes    In 4 months Hadley Calhoun MD [facility]

## 2024-09-04 RX ORDER — PANTOPRAZOLE SODIUM 40 MG/1
40 TABLET, DELAYED RELEASE ORAL
Qty: 90 TABLET | Refills: 3 | Status: SHIPPED | OUTPATIENT
Start: 2024-09-04

## 2024-09-04 NOTE — TELEPHONE ENCOUNTER
Please review. Protocol Pass     Rx is pended, is refill appropriate?     Requested Prescriptions   Pending Prescriptions Disp Refills    PANTOPRAZOLE 40 MG Oral Tab EC [Pharmacy Med Name: PANTOPRAZOLE SOD DR 40 MG TAB] 90 tablet 3     Sig: TAKE 1 TABLET BY MOUTH BEFORE BREAKFAST       Gastrointestional Medication Protocol Passed - 9/1/2024  1:01 PM        Passed - In person appointment or virtual visit in the past 12 mos or appointment in next 3 mos     Recent Outpatient Visits              4 weeks ago Diabetes mellitus type 2, insulin dependent (HCC)    [facility]    Office Visit    2 months ago [facility]    [facility]    3 months [facility]    [facility]    4 months ago [facility]    Office Visit    4 months ago     [facility] [place] Thalia Taylor PT    Office Visit          Future Appointments         Provider Department Appt Notes    In 4 months Hadley Calhoun MD [facility]                            Future Appointments         Provider Department Appt Notes    In 4 months Hadley Calhoun MD [facility]           Recent Outpatient Visits              4 weeks ago Diabetes mellitus type 2, insulin dependent (HCC)    [facility] [place] Hadley Calhoun MD    Office Visit    2 months ago Dermatitis    [facility] Quevedo MD    Office Visit    3 months ago Acute URI    [facility]    Office Visit    4 months ago Rash of [facility]  [facility] Constance Carlos APRN    Office Visit    4 months ago     [facility] in [place] Truman, Katherine, RO    Office Visit

## 2024-09-20 RX ORDER — HYDROXYZINE HYDROCHLORIDE 10 MG/1
10 TABLET, FILM COATED ORAL EVERY OTHER DAY
Qty: 45 TABLET | Refills: 3 | OUTPATIENT
Start: 2024-09-20

## 2024-09-20 NOTE — TELEPHONE ENCOUNTER
Please ask patient whether she needs this medication.  I if she needs it, I can send it to the pharmacy.

## 2024-09-20 NOTE — TELEPHONE ENCOUNTER
Please review. Protocol Failed; No Protocol    Requested Prescriptions   Pending Prescriptions Disp Refills    HYDROXYZINE 10 MG Oral Tab [Pharmacy Med Name: HYDROXYZINE HCL 10 MG TABLET] 45 tablet 3     Sig: TAKE 1 TABLET BY MOUTH EVERY OTHER DAY       There is no refill protocol information for this order            Future Appointments         Provider Department Appt Notes    In 3 months Hadley Calhoun MD [facility]           Recent Outpatient Visits              1 month ago Diabetes mellitus type 2, insulin dependent (HCC)    [facility] [place] Hadley Calhoun MD    Office Visit    3 months ago Dermatitis    [facility] Gina Frazier MD    Office Visit    4 months ago Acute URI    [facility] Kaushal Painter MD    Office Visit    5 months ago Rash of back    [facility] Constance Carlos APRN    Office Visit    5 months ago     [facility] [place] Truman, Katherine, PT    Office Visit

## 2024-09-23 NOTE — TELEPHONE ENCOUNTER
Left message to call back and MyChart message sent. Number stated on voicemail does not match number called. Left generic voicemail for call back.

## 2024-09-24 NOTE — TELEPHONE ENCOUNTER
Verified name and date of birth.  I called the patient and she stated she does not need the hydroxyzine at this time.   She will call us when she needs it.

## 2024-11-04 ENCOUNTER — NURSE TRIAGE (OUTPATIENT)
Dept: INTERNAL MEDICINE CLINIC | Facility: CLINIC | Age: 69
End: 2024-11-04

## 2024-11-04 ENCOUNTER — OFFICE VISIT (OUTPATIENT)
Dept: INTERNAL MEDICINE CLINIC | Facility: CLINIC | Age: 69
End: 2024-11-04

## 2024-11-04 VITALS
WEIGHT: 145 LBS | DIASTOLIC BLOOD PRESSURE: 68 MMHG | HEIGHT: 62 IN | HEART RATE: 76 BPM | RESPIRATION RATE: 16 BRPM | BODY MASS INDEX: 26.68 KG/M2 | SYSTOLIC BLOOD PRESSURE: 142 MMHG

## 2024-11-04 DIAGNOSIS — M54.9 MID BACK PAIN ON LEFT SIDE: ICD-10-CM

## 2024-11-04 DIAGNOSIS — I10 ESSENTIAL HYPERTENSION: ICD-10-CM

## 2024-11-04 DIAGNOSIS — M54.50 ACUTE BILATERAL LOW BACK PAIN WITHOUT SCIATICA: Primary | ICD-10-CM

## 2024-11-04 PROCEDURE — 99213 OFFICE O/P EST LOW 20 MIN: CPT | Performed by: NURSE PRACTITIONER

## 2024-11-04 RX ORDER — CYCLOBENZAPRINE HCL 5 MG
5 TABLET ORAL NIGHTLY
Qty: 15 TABLET | Refills: 0 | Status: SHIPPED | OUTPATIENT
Start: 2024-11-04

## 2024-11-04 NOTE — PATIENT INSTRUCTIONS
Ok to continue advil or tylenol as needed for pain.    Ok to take flexeril at night time as needed for upper back pain.     Ok to continue heat therapy.     You can also try lidocaine patches for pain.

## 2024-11-04 NOTE — PROGRESS NOTES
Charlotte Hernández is a 68 year old female.  Chief Complaint   Patient presents with    Back Pain     No injury noted     HPI:   She presents with lower back pain. The pain started on Friday. She denies any trauma.     She took Advil and used a heating pad with some relief.     She is also having mid back pain on the left side. The area is tender to touch. There is no rash noted.     She is concerned because she is leaving on Thursday to [place] for a wedding.     She has a history of lower back pain and has seen rheumatology in the past. This was back in 2019. She had imaging completed of the lumbar spine which showed mild osteoarthritis.     Current Outpatient Medications   Medication Sig Dispense Refill    cyclobenzaprine 5 MG Oral Tab Take 1 tablet (5 mg total) by mouth nightly. 15 tablet 0    pantoprazole 40 MG Oral Tab EC Take 1 tablet (40 mg total) by mouth before breakfast. 90 tablet 3    atorvastatin 10 MG Oral Tab Take 1 tablet (10 mg total) by mouth nightly. 90 tablet 3    NovoLOG Mix 70/30 FlexPen 100 UNIT/ML Susp Pen-injector (Insulin Aspart Prot & Aspart 70/30) INJECT 34 UNITS SUBCUTANEOUS IN THE MORNING AND 24 UNITS AT NIGHT. 51 mL 3    clotrimazole 1 % External Cream Apply 1 Application topically 2 (two) times daily. APPLY TO AFFECTED AREA 15 g 0    Hydroquinone 4 % External Cream Apply 1 Application topically 2 (two) times daily. Use bid as directed to face 30 g 1    ketoconazole 2 % External Shampoo Apply to scalp 2 times per week. 120 mL 11    Halobetasol Propionate 0.05 % External Ointment Apply a thin layer to affected area on back bid 50 g 2    fluocinonide 0.05 % External Cream Use twice daily on hands as directed 60 g 3    MAGNESIUM OXIDE 400 MG Oral Tab TAKE 1 TABLET BY MOUTH EVERY DAY 90 tablet 3    losartan 100 MG Oral Tab Take 1 tablet (100 mg total) by mouth daily. 90 tablet 3    levothyroxine 50 MCG Oral Tab Take 1 tablet (50 mcg total) by mouth before breakfast. 90 tablet 3    Insulin Pen  Needle (BD PEN NEEDLE CARY 2ND GEN) 32G X 4 MM Does not apply Misc Use as directed twice a day 200 each 3    amLODIPine 10 MG Oral Tab Take 1 tablet (10 mg total) by mouth daily. 90 tablet 3    Dulaglutide (TRULICITY) 0.75 MG/0.5ML Subcutaneous Solution Pen-injector Inject 0.75 mg into the skin once a week. 6 each 3    betamethasone dipropionate 0.05 % External Cream Apply 1 Application topically 2 (two) times daily. 15 g 0    metoprolol succinate ER 25 MG Oral Tablet 24 Hr Take 1 tablet (25 mg total) by mouth daily. 90 tablet 3    OneTouch Delica Lancets 33G Does not apply Misc 1 each 2 (two) times daily. 200 each 1    metFORMIN  MG Oral Tablet 24 Hr Take 2 tablets (1,000 mg total) by mouth daily with dinner. 180 tablet 3    Glucose Blood (ONETOUCH ULTRA) In Vitro Strip 1 each by Other route in the morning and 1 each before bedtime. 200 strip 3    hydrOXYzine 10 MG Oral Tab Take 1 tablet (10 mg total) by mouth every other day. 45 tablet 3      Past Medical History:    Anxiety state    Asthmatic bronchitis without complication (HCC)    BPV (benign positional vertigo)    Carpal tunnel syndrome    Disorder of thyroid    Diverticular disease    DUB (dysfunctional uterine bleeding)    Endometrial biopsy in     Gastritis    Helicobacter pylori infection    EGD with Biopsy in     High blood pressure    History of pregnancy     in  and ,  in     History of     Hyperlipemia    Observation for suspected condition    Osteoarthritis    Pap smear, as part of routine gynecological examination    Right shoulder tendinitis    Routine physical examination    Screen for colon cancer    repeat CLN in 10 years    Sleep apnea    Syncope and collapse    Type II or unspecified type diabetes mellitus without mention of complication, not stated as uncontrolled    Visual impairment    Readers      Past Surgical History:   Procedure Laterality Date    Adj tiss xfer lid,nos,ear <10sqcm Left  10/15/2021    L ear lobular reconstruction with flap      ,     Carpal tunnel release Right     Cholecystectomy      Colonoscopy      Negative    Colonoscopy & polypectomy  2010    Colonoscopy with Polypectomy and Biopsy, per NG    Endometrial bx conjunct w/colposcopy      Upper gi endoscopy,biopsy      EGD with Biopsy      Social History:  Social History     Socioeconomic History    Marital status:    Tobacco Use    Smoking status: Never    Smokeless tobacco: Never   Vaping Use    Vaping status: Never Used   Substance and Sexual Activity    Alcohol use: No     Alcohol/week: 0.0 standard drinks of alcohol    Drug use: No    Sexual activity: Not Currently     Partners: Male   Other Topics Concern    Caffeine Concern Yes     Comment: Tea, Coffee 2 cups daily;     Exercise Yes    Pt has a pacemaker No    Reaction to local anesthetic No   Social History Narrative    The patient does not use an assistive device..      The patient does live in a home with stairs.     Social Drivers of Health      Received from [facility]    [facility]      Family History   Problem Relation Age of Onset    Diabetes Father     Heart Disease Father         Coronary artery disease    Diabetes Mother     Heart Attack Mother 83        MI    Diabetes Sister     Lipids Other         Family H/o: Hyperlipidemia    Glaucoma Neg     Macular degeneration Neg       Allergies[1]     REVIEW OF SYSTEMS:     Review of Systems   Constitutional:  Negative for fever.   HENT: Negative.     Respiratory:  Negative for cough, shortness of breath and wheezing.    Cardiovascular:  Negative for chest pain.   Gastrointestinal:  Negative for abdominal pain.   Genitourinary:  Negative for dysuria and frequency.   Musculoskeletal:  Positive for back pain.   Skin: Negative.    Neurological: Negative.    Psychiatric/Behavioral: Negative.        Wt Readings from Last 5 Encounters:   24 145 lb (65.8 kg)    08/06/24 148 lb (67.1 kg)   05/20/24 146 lb 3.2 oz (66.3 kg)   04/18/24 146 lb (66.2 kg)   03/04/24 144 lb (65.3 kg)     Body mass index is 26.52 kg/m².      EXAM:   /68 (BP Location: Right arm, Patient Position: Sitting, Cuff Size: adult)   Pulse 76   Resp 16   Ht 5' 2\" (1.575 m)   Wt 145 lb (65.8 kg)   LMP  (LMP Unknown)   BMI 26.52 kg/m²     Physical Exam  Vitals reviewed.   Constitutional:       Appearance: Normal appearance.   HENT:      Head: Normocephalic.   Eyes:      Extraocular Movements: Extraocular movements intact.      Pupils: Pupils are equal, round, and reactive to light.   Cardiovascular:      Rate and Rhythm: Normal rate and regular rhythm.      Pulses: Normal pulses.   Pulmonary:      Breath sounds: Normal breath sounds. No wheezing.   Abdominal:      General: Bowel sounds are normal.      Palpations: Abdomen is soft.   Musculoskeletal:         General: No swelling.      Cervical back: Normal range of motion and neck supple.      Thoracic back: Tenderness present. Normal range of motion.      Lumbar back: No tenderness. Decreased range of motion.        Back:    Skin:     General: Skin is warm and dry.   Neurological:      Mental Status: She is alert and oriented to person, place, and time.   Psychiatric:         Mood and Affect: Mood normal.         Behavior: Behavior normal.            ASSESSMENT AND PLAN:   1. Acute bilateral low back pain without sciatica  - likely related to arthritis.   - ok to continue Advil or tylenol as needed for pain.   - ok to use heat therapy.  - ok to use lidocaine patches.     2. Mid back pain on left side  - pain is reproducible. No redness noted.   - no rash, dw patient to monitor and notify us if rash develops.   - cyclobenzaprine 5 MG Oral Tab; Take 1 tablet (5 mg total) by mouth nightly.  Dispense: 15 tablet; Refill: 0    3. Essential hypertension  - BP slightly elevated in office. Per patient she took her BP this morning and it was 130/70.   -  CPM      The patient indicates understanding of these issues and agrees to the plan.  Return for if symptoms do not resolve.         [1] No Known Allergies

## 2024-11-04 NOTE — TELEPHONE ENCOUNTER
Action Requested: Summary for Provider     []  Critical Lab, Recommendations Needed  [] Need Additional Advice  [x]   FYI    []   Need Orders  [] Need Medications Sent to Pharmacy  []  Other     SUMMARY: Per protocol, patient should be seen in the office within 3 days. Appointment scheduled.    Future Appointments   Date Time Provider Department Center   11/4/2024  9:40 AM Michelle Valdez APRN [facility]   1/6/2025 11:15 AM Hadley Calhoun MD [facility]      Reason for call: Back Pain  Onset: 11/1    Patient reports of intermittent mid lower back pain that started on Friday. States movement from sitting to standing position aggravates the pain, making it hard for her to stand. Denies injury. No radiation of pain. Denies numbness or tingling. Pain is moderate.She took two Advil with some relief. Also has been applying heat pack. Requesting to be seen so she can address symptom before going to a wedding this week. Care advice given per protocol.  Patient verbalized understanding and agreed with plan of care.     Reason for Disposition   Patient wants to be seen    Protocols used: Back Pain-[facility]-[place]

## 2025-01-06 ENCOUNTER — OFFICE VISIT (OUTPATIENT)
Dept: INTERNAL MEDICINE CLINIC | Facility: CLINIC | Age: 70
End: 2025-01-06

## 2025-01-06 VITALS
SYSTOLIC BLOOD PRESSURE: 136 MMHG | WEIGHT: 148.81 LBS | BODY MASS INDEX: 27.38 KG/M2 | HEART RATE: 74 BPM | DIASTOLIC BLOOD PRESSURE: 82 MMHG | OXYGEN SATURATION: 98 % | HEIGHT: 62 IN

## 2025-01-06 DIAGNOSIS — Z79.4 DIABETES MELLITUS TYPE 2, INSULIN DEPENDENT (HCC): Primary | ICD-10-CM

## 2025-01-06 DIAGNOSIS — Z23 NEED FOR PNEUMOCOCCAL VACCINATION: ICD-10-CM

## 2025-01-06 DIAGNOSIS — Z12.31 BREAST CANCER SCREENING BY MAMMOGRAM: ICD-10-CM

## 2025-01-06 DIAGNOSIS — I10 ESSENTIAL HYPERTENSION: ICD-10-CM

## 2025-01-06 DIAGNOSIS — E78.2 MIXED HYPERLIPIDEMIA: ICD-10-CM

## 2025-01-06 DIAGNOSIS — E11.9 DIABETES MELLITUS TYPE 2, INSULIN DEPENDENT (HCC): Primary | ICD-10-CM

## 2025-01-06 PROCEDURE — 90677 PCV20 VACCINE IM: CPT | Performed by: INTERNAL MEDICINE

## 2025-01-06 PROCEDURE — G0009 ADMIN PNEUMOCOCCAL VACCINE: HCPCS | Performed by: INTERNAL MEDICINE

## 2025-01-06 PROCEDURE — 99214 OFFICE O/P EST MOD 30 MIN: CPT | Performed by: INTERNAL MEDICINE

## 2025-01-06 NOTE — PROGRESS NOTES
Charlotte Hernández is a 69 year old female.  Chief Complaint   Patient presents with    Follow - Up     HPI:   69-year-old pleasant lady here for follow-up.  Overall she reports that she is doing okay except having arthralgia.  Denies any chest pain or heaviness.  Functional capacity is at baseline.  Denies any abdominal pain nausea vomiting.  No hypoglycemic events.  She would like to come off of insulin.      Current Outpatient Medications   Medication Sig Dispense Refill    pantoprazole 40 MG Oral Tab EC Take 1 tablet (40 mg total) by mouth before breakfast. 90 tablet 3    atorvastatin 10 MG Oral Tab Take 1 tablet (10 mg total) by mouth nightly. 90 tablet 3    NovoLOG Mix 70/30 FlexPen 100 UNIT/ML Susp Pen-injector (Insulin Aspart Prot & Aspart 70/30) INJECT 34 UNITS SUBCUTANEOUS IN THE MORNING AND 24 UNITS AT NIGHT. 51 mL 3    clotrimazole 1 % External Cream Apply 1 Application topically 2 (two) times daily. APPLY TO AFFECTED AREA 15 g 0    Hydroquinone 4 % External Cream Apply 1 Application topically 2 (two) times daily. Use bid as directed to face 30 g 1    ketoconazole 2 % External Shampoo Apply to scalp 2 times per week. 120 mL 11    Halobetasol Propionate 0.05 % External Ointment Apply a thin layer to affected area on back bid 50 g 2    fluocinonide 0.05 % External Cream Use twice daily on hands as directed 60 g 3    MAGNESIUM OXIDE 400 MG Oral Tab TAKE 1 TABLET BY MOUTH EVERY DAY 90 tablet 3    losartan 100 MG Oral Tab Take 1 tablet (100 mg total) by mouth daily. 90 tablet 3    levothyroxine 50 MCG Oral Tab Take 1 tablet (50 mcg total) by mouth before breakfast. 90 tablet 3    Insulin Pen Needle (BD PEN NEEDLE CARY 2ND GEN) 32G X 4 MM Does not apply Misc Use as directed twice a day 200 each 3    amLODIPine 10 MG Oral Tab Take 1 tablet (10 mg total) by mouth daily. 90 tablet 3    Dulaglutide (TRULICITY) 0.75 MG/0.5ML Subcutaneous Solution Pen-injector Inject 0.75 mg into the skin once a week. 6 each 3     betamethasone dipropionate 0.05 % External Cream Apply 1 Application topically 2 (two) times daily. 15 g 0    metoprolol succinate ER 25 MG Oral Tablet 24 Hr Take 1 tablet (25 mg total) by mouth daily. 90 tablet 3    OneTouch Delica Lancets 33G Does not apply Misc 1 each 2 (two) times daily. 200 each 1    metFORMIN  MG Oral Tablet 24 Hr Take 2 tablets (1,000 mg total) by mouth daily with dinner. 180 tablet 3    Glucose Blood (ONETOUCH ULTRA) In Vitro Strip 1 each by Other route in the morning and 1 each before bedtime. 200 strip 3    hydrOXYzine 10 MG Oral Tab Take 1 tablet (10 mg total) by mouth every other day. 45 tablet 3      Past Medical History:    Anxiety state    Asthmatic bronchitis without complication (HCC)    BPV (benign positional vertigo)    Carpal tunnel syndrome    Disorder of thyroid    Diverticular disease    DUB (dysfunctional uterine bleeding)    Endometrial biopsy in     Gastritis    Helicobacter pylori infection    EGD with Biopsy in     High blood pressure    History of pregnancy     in  and ,  in     History of     Hyperlipemia    Observation for suspected condition    Osteoarthritis    Pap smear, as part of routine gynecological examination    Right shoulder tendinitis    Routine physical examination    Screen for colon cancer    repeat CLN in 10 years    Sleep apnea    Syncope and collapse    Type II or unspecified type diabetes mellitus without mention of complication, not stated as uncontrolled    Visual impairment    Readers      Past Surgical History:   Procedure Laterality Date    Adj tiss xfer lid,nos,ear <10sqcm Left 10/15/2021    L ear lobular reconstruction with flap      ,     Carpal tunnel release Right     Cholecystectomy      Colonoscopy      Negative    Colonoscopy & polypectomy  2010    Colonoscopy with Polypectomy and Biopsy, per NG    Endometrial bx conjunct w/colposcopy      Upper gi  endoscopy,biopsy  2011    EGD with Biopsy      Social History:  Social History     Socioeconomic History    Marital status:    Tobacco Use    Smoking status: Never    Smokeless tobacco: Never   Vaping Use    Vaping status: Never Used   Substance and Sexual Activity    Alcohol use: No     Alcohol/week: 0.0 standard drinks of alcohol    Drug use: No    Sexual activity: Not Currently     Partners: Male   Other Topics Concern    Caffeine Concern Yes     Comment: Tea, Coffee 2 cups daily;     Exercise Yes    Pt has a pacemaker No    Reaction to local anesthetic No   Social History Narrative    The patient does not use an assistive device..      The patient does live in a home with stairs.     Social Drivers of Health      Received from [facility]    [facility]      Family History   Problem Relation Age of Onset    Diabetes Father     Heart Disease Father         Coronary artery disease    Diabetes Mother     Heart Attack Mother 83        MI    Diabetes Sister     Lipids Other         Family H/o: Hyperlipidemia    Glaucoma Neg     Macular degeneration Neg       Allergies[1]     REVIEW OF SYSTEMS:   Review of Systems   Review of Systems   Constitutional: Negative for activity change, appetite change and fever.   HENT: Negative for congestion and voice change.    Respiratory: Negative for cough and shortness of breath.    Cardiovascular: Negative for chest pain.   Gastrointestinal: Negative for abdominal distention, abdominal pain and vomiting.   Genitourinary: Negative for hematuria.   Skin: Negative for wound.   Psychiatric/Behavioral: Negative for behavioral problems.   Wt Readings from Last 5 Encounters:   01/06/25 148 lb 12.8 oz (67.5 kg)   11/04/24 145 lb (65.8 kg)   08/06/24 148 lb (67.1 kg)   05/20/24 146 lb 3.2 oz (66.3 kg)   04/18/24 146 lb (66.2 kg)     Body mass index is 27.22 kg/m².      EXAM:   /82   Pulse 74   Ht 5' 2\" (1.575 m)   Wt 148 lb 12.8 oz (67.5 kg)   LMP  (LMP  Unknown)   SpO2 98%   BMI 27.22 kg/m²   Physical Exam   Constitutional:       Appearance: Normal appearance.   HENT:      Head: Normocephalic.   Eyes:      Conjunctiva/sclera: Conjunctivae normal.   Cardiovascular:      Rate and Rhythm: Normal rate and regular rhythm.      Heart sounds: Normal heart sounds. No murmur heard.  Pulmonary:      Effort: Pulmonary effort is normal.      Breath sounds: Normal breath sounds. No rhonchi or rales.   Abdominal:      General: Bowel sounds are normal.      Palpations: Abdomen is soft.      Tenderness: There is no abdominal tenderness.   Musculoskeletal:      Cervical back: Neck supple.      Right lower leg: No edema.      Left lower leg: No edema.   Skin:     General: Skin is warm and dry.   Neurological:      General: No focal deficit present.      Mental Status: He is alert and oriented to person, place, and time. Mental status is at baseline.   Psychiatric:         Mood and Affect: Mood normal.         Behavior: Behavior normal.   Bilateral barefoot skin diabetic exam is normal, visualized feet and the appearance is normal.  Bilateral monofilament/sensation of both feet is normal.  Pulsation pedal pulse exam of both lower legs/feet is normal as well.       ASSESSMENT AND PLAN:   1. Breast cancer screening by mammogram    - [facility] INDIO 2D+3D SCREENING BILAT (CPT=77067/30582); Future    2. Diabetes mellitus type 2, insulin dependent (HCC)  Lab Results   Component Value Date    A1C 7.2 (H) 05/01/2024    LDL 73 05/01/2024    MICROALBCREA 10.9 05/01/2024      Informed her that it will be difficult for her to come off of insulin.  Continue statins.  Ophthalmology referral given.  Check A1c and urine for microalbumin before next visit.  Foot exam completed today.  - CBC, Platelet; No Differential  - Comp Metabolic Panel (14)  - Hemoglobin A1C  - Lipid Panel  - TSH W Reflex To Free T4  - Microalb/Creat Ratio, Random Urine  - Ophthalmology Referral - External    3. Mixed  hyperlipidemia  Lab Results   Component Value Date    LDL 73 05/01/2024    AST 28 05/01/2024    ALT 28 05/01/2024      Encouraged patient to eat healthy.  Avoid fat fried foods and increase activity as tolerated.  We will monitor lipid profile and liver function test.      4. Essential hypertension  Lab Results   Component Value Date    CREATSERUM 0.93 05/01/2024    TSH 2.871 05/01/2024     Will continue to monitor blood pressure.  Encouraged patient to avoid salt.  Continue current medical regimen.    - CBC, Platelet; No Differential  - Comp Metabolic Panel (14)  - Hemoglobin A1C  - Lipid Panel  - TSH W Reflex To Free T4  - Microalb/Creat Ratio, Random Urine    5. Need for pneumococcal vaccination    - Prevnar 20 (PCV20) [82451]    Plan: Labs ordered.  PCV 20 given today.  Mammogram ordered.  Can take over-the-counter Motrin 200 mg for arthralgia with food.  If everything is good, I will see her back in 6 months.  Meanwhile, if there is any concerns she will contact me.      The patient indicates understanding of these issues and agrees to the plan.  No follow-ups on file.    This note was prepared using Dragon Medical voice recognition dictation software. As a result errors may occur. When identified these errors have been corrected. While every attempt is made to correct errors during dictation discrepancies may still exist.       [1] No Known Allergies

## 2025-02-13 ENCOUNTER — HOSPITAL ENCOUNTER (OUTPATIENT)
Dept: MAMMOGRAPHY | Age: 70
Discharge: HOME | End: 2025-02-13
Attending: INTERNAL MEDICINE
Payer: MEDICARE

## 2025-02-13 ENCOUNTER — LAB ENCOUNTER (OUTPATIENT)
Dept: LAB | Age: 70
End: 2025-02-13
Attending: INTERNAL MEDICINE
Payer: MEDICARE

## 2025-02-13 DIAGNOSIS — Z12.31 BREAST CANCER SCREENING BY MAMMOGRAM: ICD-10-CM

## 2025-02-13 LAB
ALBUMIN SERPL-MCNC: 4.5 G/DL (ref 3.2–4.8)
ALBUMIN/GLOB SERPL: 1.7 {RATIO} (ref 1–2)
ALP LIVER SERPL-CCNC: 108 U/L
ALT SERPL-CCNC: 27 U/L
ANION GAP SERPL CALC-SCNC: 8 MMOL/L (ref 0–18)
AST SERPL-CCNC: 23 U/L (ref ?–34)
BILIRUB SERPL-MCNC: 0.4 MG/DL (ref 0.2–1.1)
BUN BLD-MCNC: 11 MG/DL (ref 9–23)
BUN/CREAT SERPL: 11.3 (ref 10–20)
CALCIUM BLD-MCNC: 9 MG/DL (ref 8.7–10.4)
CHLORIDE SERPL-SCNC: 105 MMOL/L (ref 98–112)
CHOLEST SERPL-MCNC: 155 MG/DL (ref ?–200)
CO2 SERPL-SCNC: 28 MMOL/L (ref 21–32)
CREAT BLD-MCNC: 0.97 MG/DL
CREAT UR-SCNC: 147.3 MG/DL
DEPRECATED RDW RBC AUTO: 36.9 FL (ref 35.1–46.3)
EGFRCR SERPLBLD CKD-EPI 2021: 63 ML/MIN/1.73M2 (ref 60–?)
ERYTHROCYTE [DISTWIDTH] IN BLOOD BY AUTOMATED COUNT: 12.1 % (ref 11–15)
EST. AVERAGE GLUCOSE BLD GHB EST-MCNC: 174 MG/DL (ref 68–126)
FASTING PATIENT LIPID ANSWER: YES
FASTING STATUS PATIENT QL REPORTED: YES
GLOBULIN PLAS-MCNC: 2.6 G/DL (ref 2–3.5)
GLUCOSE BLD-MCNC: 140 MG/DL (ref 70–99)
HBA1C MFR BLD: 7.7 % (ref ?–5.7)
HCT VFR BLD AUTO: 41.9 %
HDLC SERPL-MCNC: 44 MG/DL (ref 40–59)
HGB BLD-MCNC: 14 G/DL
LDLC SERPL CALC-MCNC: 76 MG/DL (ref ?–100)
MCH RBC QN AUTO: 27.5 PG (ref 26–34)
MCHC RBC AUTO-ENTMCNC: 33.4 G/DL (ref 31–37)
MCV RBC AUTO: 82.2 FL
MICROALBUMIN UR-MCNC: 2.1 MG/DL
MICROALBUMIN/CREAT 24H UR-RTO: 14.3 UG/MG (ref ?–30)
NONHDLC SERPL-MCNC: 111 MG/DL (ref ?–130)
OSMOLALITY SERPL CALC.SUM OF ELEC: 294 MOSM/KG (ref 275–295)
PLATELET # BLD AUTO: 322 10(3)UL (ref 150–450)
POTASSIUM SERPL-SCNC: 4.3 MMOL/L (ref 3.5–5.1)
PROT SERPL-MCNC: 7.1 G/DL (ref 5.7–8.2)
RBC # BLD AUTO: 5.1 X10(6)UL
SODIUM SERPL-SCNC: 141 MMOL/L (ref 136–145)
TRIGL SERPL-MCNC: 213 MG/DL (ref 30–149)
TSI SER-ACNC: 1.67 UIU/ML (ref 0.55–4.78)
VLDLC SERPL CALC-MCNC: 33 MG/DL (ref 0–30)
WBC # BLD AUTO: 6.5 X10(3) UL (ref 4–11)

## 2025-02-13 PROCEDURE — 85027 COMPLETE CBC AUTOMATED: CPT | Performed by: INTERNAL MEDICINE

## 2025-02-13 PROCEDURE — 77063 BREAST TOMOSYNTHESIS BI: CPT | Performed by: INTERNAL MEDICINE

## 2025-02-13 PROCEDURE — 80053 COMPREHEN METABOLIC PANEL: CPT | Performed by: INTERNAL MEDICINE

## 2025-02-13 PROCEDURE — 82570 ASSAY OF URINE CREATININE: CPT | Performed by: INTERNAL MEDICINE

## 2025-02-13 PROCEDURE — 80061 LIPID PANEL: CPT | Performed by: INTERNAL MEDICINE

## 2025-02-13 PROCEDURE — 83036 HEMOGLOBIN GLYCOSYLATED A1C: CPT | Performed by: INTERNAL MEDICINE

## 2025-02-13 PROCEDURE — 84443 ASSAY THYROID STIM HORMONE: CPT | Performed by: INTERNAL MEDICINE

## 2025-02-13 PROCEDURE — 82043 UR ALBUMIN QUANTITATIVE: CPT | Performed by: INTERNAL MEDICINE

## 2025-02-13 PROCEDURE — 77067 SCR MAMMO BI INCL CAD: CPT | Performed by: INTERNAL MEDICINE

## 2025-02-13 PROCEDURE — 36415 COLL VENOUS BLD VENIPUNCTURE: CPT | Performed by: INTERNAL MEDICINE

## 2025-03-14 RX ORDER — LANCETS 33 GAUGE
1 EACH MISCELLANEOUS 2 TIMES DAILY
Qty: 200 EACH | Refills: 3 | Status: SHIPPED | OUTPATIENT
Start: 2025-03-14

## 2025-03-14 RX ORDER — BLOOD SUGAR DIAGNOSTIC
1 STRIP MISCELLANEOUS 2 TIMES DAILY
Qty: 200 STRIP | Refills: 3 | Status: SHIPPED | OUTPATIENT
Start: 2025-03-14

## 2025-03-14 NOTE — TELEPHONE ENCOUNTER
Refill passed per [facility] protocols.    Requested Prescriptions   Pending Prescriptions Disp Refills    Glucose Blood (ONETOUCH ULTRA) In Vitro Strip [Pharmacy Med Name: ONE TOUCH ULTRA BLUE TEST STRP] 200 strip 3     Si each by Other route in the morning and 1 each before bedtime.       Diabetic Supplies Protocol Passed - 3/14/2025 10:52 AM        Passed - In person appointment or virtual visit in the past 12 mos or appointment in next 3 mos        Passed - Medication is active on med list          OneTouch Delica Lancets 33G Does not apply Misc 200 each 3     Si each 2 (two) times daily.       Diabetic Supplies Protocol Passed - 3/14/2025 10:52 AM        Passed - In person appointment or virtual visit in the past 12 mos or appointment in next 3 mos        Passed - Medication is active on med list

## 2025-03-17 RX ORDER — METOPROLOL SUCCINATE 25 MG/1
25 TABLET, EXTENDED RELEASE ORAL DAILY
Qty: 90 TABLET | Refills: 3 | Status: SHIPPED | OUTPATIENT
Start: 2025-03-17

## 2025-03-17 NOTE — TELEPHONE ENCOUNTER
Refill Per Protocol     Requested Prescriptions   Pending Prescriptions Disp Refills    METOPROLOL SUCCINATE ER 25 MG Oral Tablet 24 Hr [Pharmacy Med Name: METOPROLOL SUCC ER 25 MG TAB] 90 tablet 3     Sig: Take 1 tablet (25 mg total) by mouth daily.       Hypertension Medications Protocol Passed - 3/17/2025  2:36 PM        Passed - CMP or BMP in past 12 months        Passed - Last BP reading less than 140/90     BP Readings from Last 1 Encounters:   01/06/25 136/82               Passed - In person appointment or virtual visit in the past 12 mos or appointment in next 3 mos     Recent Outpatient Visits              2 months ago Diabetes mellitus type 2, insulin dependent ([facility])    [facility] Hadley Calhoun MD    Office Visit    4 months ago Acute bilateral low back pain without sciatica    [facility], [place] Michelle Valdez APRN    Office Visit    7 months ago Diabetes mellitus type 2, insulin dependent ([facility])    [facility], [place] Hadley Calhoun MD    Office Visit    9 months ago Dermatitis    [facility] Gina Frazier MD    Office Visit    10 months ago Acute URI    [facility] Kaushal Painter MD    Office Visit          Future Appointments         Provider Department Appt Notes    In 2 months Hadley Calhoun MD [facility] last px 3/04/24                    Passed - EGFRCR or GFRNAA > 50     GFR Evaluation  EGFRCR: 63 , resulted on 2/13/2025          Passed - Medication is active on med list

## 2025-03-20 RX ORDER — METFORMIN HYDROCHLORIDE 500 MG/1
1000 TABLET, EXTENDED RELEASE ORAL
Qty: 180 TABLET | Refills: 3 | Status: SHIPPED | OUTPATIENT
Start: 2025-03-20

## 2025-03-20 NOTE — TELEPHONE ENCOUNTER
Please Review. Protocol Failed; No Protocol     Lab Results   Component Value Date     A1C 7.7 (H) 02/13/2025

## 2025-03-28 NOTE — TELEPHONE ENCOUNTER
Please Review. Protocol Failed; No Protocol     Requested Prescriptions   Pending Prescriptions Disp Refills    HYDROXYZINE 10 MG Oral Tab [Pharmacy Med Name: HYDROXYZINE HCL 10 MG TABLET] 45 tablet 3     Sig: TAKE 1 TABLET BY MOUTH EVERY OTHER DAY       There is no refill protocol information for this order

## 2025-04-01 RX ORDER — HYDROXYZINE HYDROCHLORIDE 10 MG/1
10 TABLET, FILM COATED ORAL EVERY OTHER DAY
Qty: 45 TABLET | Refills: 3 | OUTPATIENT
Start: 2025-04-01

## 2025-04-02 RX ORDER — HYDROXYZINE HYDROCHLORIDE 10 MG/1
10 TABLET, FILM COATED ORAL DAILY PRN
Qty: 30 TABLET | Refills: 0 | Status: SHIPPED | OUTPATIENT
Start: 2025-04-02

## 2025-04-14 ENCOUNTER — OFFICE VISIT (OUTPATIENT)
Dept: INTERNAL MEDICINE CLINIC | Facility: CLINIC | Age: 70
End: 2025-04-14

## 2025-04-14 ENCOUNTER — NURSE TRIAGE (OUTPATIENT)
Dept: INTERNAL MEDICINE CLINIC | Facility: CLINIC | Age: 70
End: 2025-04-14

## 2025-04-14 VITALS
TEMPERATURE: 97 F | SYSTOLIC BLOOD PRESSURE: 136 MMHG | HEIGHT: 62 IN | DIASTOLIC BLOOD PRESSURE: 86 MMHG | BODY MASS INDEX: 26.68 KG/M2 | HEART RATE: 80 BPM | OXYGEN SATURATION: 98 % | WEIGHT: 145 LBS

## 2025-04-14 DIAGNOSIS — M54.12 CERVICAL RADICULOPATHY: ICD-10-CM

## 2025-04-14 DIAGNOSIS — M25.561 CHRONIC PAIN OF RIGHT KNEE: ICD-10-CM

## 2025-04-14 DIAGNOSIS — M54.2 NECK PAIN: Primary | ICD-10-CM

## 2025-04-14 DIAGNOSIS — I10 ESSENTIAL HYPERTENSION: ICD-10-CM

## 2025-04-14 DIAGNOSIS — J20.8 VIRAL BRONCHITIS: ICD-10-CM

## 2025-04-14 DIAGNOSIS — G89.29 CHRONIC PAIN OF RIGHT KNEE: ICD-10-CM

## 2025-04-14 PROCEDURE — 99214 OFFICE O/P EST MOD 30 MIN: CPT | Performed by: INTERNAL MEDICINE

## 2025-04-14 PROCEDURE — G2211 COMPLEX E/M VISIT ADD ON: HCPCS | Performed by: INTERNAL MEDICINE

## 2025-04-14 RX ORDER — METOPROLOL SUCCINATE 50 MG/1
50 TABLET, EXTENDED RELEASE ORAL DAILY
Qty: 90 TABLET | Refills: 1 | Status: SHIPPED | OUTPATIENT
Start: 2025-04-14

## 2025-04-14 NOTE — TELEPHONE ENCOUNTER
Spoke directly to PCP and confirmed add on appt today at 3:15pm.     Called patient to confirm appt time.    Future Appointments   Date Time Provider Department Center   4/14/2025  3:15 PM Hadley Calhoun MD [facility]   6/9/2025 11:00 AM Hadley Calhoun MD [facility]

## 2025-04-14 NOTE — TELEPHONE ENCOUNTER
Action Requested: Summary for Provider     []  Critical Lab, Recommendations Needed  [x] Need Additional Advice  []   FYI    []   Need Orders  [] Need Medications Sent to Pharmacy  []  Other     SUMMARY: Per Protocol disposition advised to be seen in the office. Patient has an appt for  for cough symptoms and only wants to see PCP. Offered other appts with other providers but patient declines. Message sent to provider to see if patient can get an appt sooner.    Patient (name and  verified) c/o cough, sinus congestion and sore throat for 3 days. Denies shortness of breath or chest pain.    Reason for call: Cough  Onset: 3 days    Reason for Disposition   Patient wants to be seen    Protocols used: Cough-A-OH

## 2025-04-15 NOTE — PROGRESS NOTES
Charlotte Hernández is a 69 year old female.  Chief Complaint   Patient presents with    Sore Throat    Nasal Congestion    Cough     HPI:   69-year-old lady here for bronchitis symptoms and also for concern of fatigue during [employer] service.  She reports that she has worsening neck pain that is radiating to the occipital area and to the shoulder area.  Denies any weakness of the hands.  She also has knee pain that is getting worse with activity.  Denies any chest pain.  Denies any abdominal pain nausea vomiting.    Current Medications[1]   Past Medical History[2]   Past Surgical History[3]   Social History:  Short Social Hx on File[4]   Family History[5]   Allergies[6]     REVIEW OF SYSTEMS:   Review of Systems   Review of Systems   Constitutional: Negative for activity change, appetite change and fever.   HENT: Negative for congestion and voice change.    Respiratory: Negative for cough and shortness of breath.    Cardiovascular: Negative for chest pain.   Gastrointestinal: Negative for abdominal distention, abdominal pain and vomiting.   Genitourinary: Negative for hematuria.   Skin: Negative for wound.   Psychiatric/Behavioral: Negative for behavioral problems.   Wt Readings from Last 5 Encounters:   04/14/25 145 lb (65.8 kg)   01/06/25 148 lb 12.8 oz (67.5 kg)   11/04/24 145 lb (65.8 kg)   08/06/24 148 lb (67.1 kg)   05/20/24 146 lb 3.2 oz (66.3 kg)     Body mass index is 26.52 kg/m².      EXAM:   /86   Pulse 80   Temp 97.3 °F (36.3 °C) (Temporal)   Ht 5' 2\" (1.575 m)   Wt 145 lb (65.8 kg)   LMP  (LMP Unknown)   SpO2 98%   BMI 26.52 kg/m²   Physical Exam   Constitutional:       Appearance: Normal appearance.   HENT:      Head: Normocephalic.   Eyes:      Conjunctiva/sclera: Conjunctivae normal.   Cardiovascular:      Rate and Rhythm: Normal rate and regular rhythm.      Heart sounds: Normal heart sounds. No murmur heard.  Pulmonary:      Effort: Pulmonary effort is normal.      Breath sounds: Normal  breath sounds. No rhonchi or rales.   Abdominal:      General: Bowel sounds are normal.      Palpations: Abdomen is soft.      Tenderness: There is no abdominal tenderness.   Musculoskeletal:      Cervical back: Neck supple.      Right lower leg: No edema.      Left lower leg: No edema.   Skin:     General: Skin is warm and dry.   Neurological:      General: No focal deficit present.      Mental Status: He is alert and oriented to person, place, and time. Mental status is at baseline.   Psychiatric:         Mood and Affect: Mood normal.         Behavior: Behavior normal.   Neck-no erythema no warmth  Right knee has crepitus left knee has crepitus    ASSESSMENT AND PLAN:   1. Neck pain  I have reviewed the previous x-ray report.  She does have underlying spine disease.  Will refer to physical therapy.  Because of radiculopathy, I have given her referral for MRI of cervical spine and physiatry evaluation.  - Physical Therapy Referral - [facility]  - MRI SPINE CERVICAL (CPT=72141); Future  - PHYSIATRY - INTERNAL    2. Cervical radiculopathy  As in #1.  Discussed regarding gabapentin she wants to hold off for now.  - Physical Therapy Referral - [facility]  - MRI SPINE CERVICAL (CPT=72141); Future  - PHYSIATRY - INTERNAL    3. Chronic pain of right knee  Most likely arthritis.  Will get x-ray.  Encourage patient to apply Voltaren gel.  - XR KNEE, COMPLETE (4 OR MORE VIEWS), RIGHT (CPT=73564); Future    4. Viral bronchitis  Her symptoms are suggestive of viral bronchitis.  Decided to manage conservatively.    5. Essential hypertension  Blood pressure is controlled.  Continue metoprolol.    Plan: Medication prescribed, x-ray ordered MRI ordered physical therapy referral given physiatry referral given.  If it is getting worse, she will contact me.  If everything is good I will see her back as scheduled      The patient indicates understanding of these issues and agrees to the plan.  No follow-ups on  file.    This note was prepared using Dragon Medical voice recognition dictation software. As a result errors may occur. When identified these errors have been corrected. While every attempt is made to correct errors during dictation discrepancies may still exist.       [1]   Current Outpatient Medications   Medication Sig Dispense Refill    metoprolol succinate ER 50 MG Oral Tablet 24 Hr Take 1 tablet (50 mg total) by mouth daily. 90 tablet 1    hydrOXYzine 10 MG Oral Tab Take 1 tablet (10 mg total) by mouth daily as needed for Itching. 30 tablet 0    metFORMIN  MG Oral Tablet 24 Hr Take 2 tablets (1,000 mg total) by mouth daily with dinner. 180 tablet 3    Glucose Blood (ONETOUCH ULTRA) In Vitro Strip 1 each by In Vitro route 2 (two) times daily. 200 strip 3    OneTouch Delica Lancets 33G Does not apply Misc 1 Lancet by Finger stick route 2 (two) times daily. 200 each 3    pantoprazole 40 MG Oral Tab EC Take 1 tablet (40 mg total) by mouth before breakfast. 90 tablet 3    atorvastatin 10 MG Oral Tab Take 1 tablet (10 mg total) by mouth nightly. 90 tablet 3    NovoLOG Mix 70/30 FlexPen 100 UNIT/ML Susp Pen-injector (Insulin Aspart Prot & Aspart 70/30) INJECT 34 UNITS SUBCUTANEOUS IN THE MORNING AND 24 UNITS AT NIGHT. 51 mL 3    clotrimazole 1 % External Cream Apply 1 Application topically 2 (two) times daily. APPLY TO AFFECTED AREA 15 g 0    Hydroquinone 4 % External Cream Apply 1 Application topically 2 (two) times daily. Use bid as directed to face 30 g 1    ketoconazole 2 % External Shampoo Apply to scalp 2 times per week. 120 mL 11    Halobetasol Propionate 0.05 % External Ointment Apply a thin layer to affected area on back bid 50 g 2    fluocinonide 0.05 % External Cream Use twice daily on hands as directed 60 g 3    MAGNESIUM OXIDE 400 MG Oral Tab TAKE 1 TABLET BY MOUTH EVERY DAY 90 tablet 3    losartan 100 MG Oral Tab Take 1 tablet (100 mg total) by mouth daily. 90 tablet 3    levothyroxine 50 MCG  Oral Tab Take 1 tablet (50 mcg total) by mouth before breakfast. 90 tablet 3    Insulin Pen Needle (BD PEN NEEDLE CARY 2ND GEN) 32G X 4 MM Does not apply Misc Use as directed twice a day 200 each 3    amLODIPine 10 MG Oral Tab Take 1 tablet (10 mg total) by mouth daily. 90 tablet 3    Dulaglutide (TRULICITY) 0.75 MG/0.5ML Subcutaneous Solution Pen-injector Inject 0.75 mg into the skin once a week. 6 each 3    betamethasone dipropionate 0.05 % External Cream Apply 1 Application topically 2 (two) times daily. 15 g 0    hydrOXYzine 10 MG Oral Tab Take 1 tablet (10 mg total) by mouth every other day. 45 tablet 3   [2]   Past Medical History:   Anxiety state    Asthmatic bronchitis without complication (HCC)    BPV (benign positional vertigo)    Carpal tunnel syndrome    Disorder of thyroid    Diverticular disease    DUB (dysfunctional uterine bleeding)    Endometrial biopsy in     Gastritis    Helicobacter pylori infection    EGD with Biopsy in     High blood pressure    History of pregnancy     in  and ,  in     History of     Hyperlipemia    Observation for suspected condition    Osteoarthritis    Pap smear, as part of routine gynecological examination    Right shoulder tendinitis    Routine physical examination    Screen for colon cancer    repeat CLN in 10 years    Sleep apnea    Syncope and collapse    Type II or unspecified type diabetes mellitus without mention of complication, not stated as uncontrolled    Visual impairment    Readers   [3]   Past Surgical History:  Procedure Laterality Date    Adj tiss xfer lid,nos,ear <10sqcm Left 10/15/2021    L ear lobular reconstruction with flap      ,     Carpal tunnel release Right     Cholecystectomy      Colonoscopy      Negative    Colonoscopy & polypectomy  2010    Colonoscopy with Polypectomy and Biopsy, per NG    Endometrial bx conjunct w/colposcopy      Upper gi endoscopy,biopsy       EGD with Biopsy   [4]   Social History  Socioeconomic History    Marital status:    Tobacco Use    Smoking status: Never    Smokeless tobacco: Never   Vaping Use    Vaping status: Never Used   Substance and Sexual Activity    Alcohol use: No     Alcohol/week: 0.0 standard drinks of alcohol    Drug use: No    Sexual activity: Not Currently     Partners: Male   Other Topics Concern    Caffeine Concern Yes     Comment: Tea, Coffee 2 cups daily;     Exercise Yes    Pt has a pacemaker No    Reaction to local anesthetic No   Social History Narrative    The patient does not use an assistive device..      The patient does live in a home with stairs.     Social Drivers of Health     Food Insecurity: No Food Insecurity (4/14/2025)    NCSS - Food Insecurity     Worried About Running Out of Food in the Last Year: No     Ran Out of Food in the Last Year: No   Transportation Needs: No Transportation Needs (4/14/2025)    NCSS - Transportation     Lack of Transportation: No   Housing Stability: Not At Risk (4/14/2025)    NCSS - Housing/Utilities     Has Housing: Yes     Worried About Losing Housing: No     Unable to Get Utilities: No   [5]   Family History  Problem Relation Age of Onset    Diabetes Father     Heart Disease Father         Coronary artery disease    Diabetes Mother     Heart Attack Mother 83        MI    Diabetes Sister     Lipids Other         Family H/o: Hyperlipidemia    Glaucoma Neg     Macular degeneration Neg    [6] No Known Allergies

## 2025-04-17 ENCOUNTER — HOSPITAL ENCOUNTER (OUTPATIENT)
Dept: GENERAL RADIOLOGY | Age: 70
Discharge: HOME | End: 2025-04-17
Attending: INTERNAL MEDICINE
Payer: MEDICARE

## 2025-04-17 DIAGNOSIS — G89.29 CHRONIC PAIN OF RIGHT KNEE: ICD-10-CM

## 2025-04-17 DIAGNOSIS — M25.561 CHRONIC PAIN OF RIGHT KNEE: ICD-10-CM

## 2025-04-17 PROCEDURE — 73564 X-RAY EXAM KNEE 4 OR MORE: CPT | Performed by: INTERNAL MEDICINE

## 2025-04-29 ENCOUNTER — TELEPHONE (OUTPATIENT)
Dept: PHYSICAL THERAPY | Facility: HOSPITAL | Age: 70
End: 2025-04-29

## 2025-04-30 ENCOUNTER — OFFICE VISIT (OUTPATIENT)
Dept: PHYSICAL THERAPY | Age: 70
End: 2025-04-30
Attending: INTERNAL MEDICINE
Payer: MEDICARE

## 2025-04-30 DIAGNOSIS — M54.12 CERVICAL RADICULOPATHY: ICD-10-CM

## 2025-04-30 DIAGNOSIS — M54.2 NECK PAIN: Primary | ICD-10-CM

## 2025-04-30 PROCEDURE — 97161 PT EVAL LOW COMPLEX 20 MIN: CPT | Performed by: PHYSICAL THERAPIST

## 2025-04-30 PROCEDURE — 97530 THERAPEUTIC ACTIVITIES: CPT | Performed by: PHYSICAL THERAPIST

## 2025-04-30 NOTE — PROGRESS NOTES
SPINE EVALUATION:     Diagnosis:   Neck pain (M54.2), Cervical radiculopathy (M54.12) Patient:  Charlotte Hernández (69 year old, female)        Referring Provider: Hadley Calhoun  Today's Date   4/30/2025    Precautions:  None   Date of Evaluation: 04/30/25  Next MD visit: 6/9/2025  Date of Surgery: N/A     PATIENT SUMMARY   Summary of chief complaints: B cervical pain and R-sided radiculopathy 1 month ago - both sides - down R arm  History of current condition: Pt reports B cervical pain and R-sided radiculopathy that started 1 year ago with a recent exacerbation 1 month ago. Pt reports no LITTLE. Her pain starts from her occiput and travels down into the R deltoid with T/N in both of her hands. Does not report a loss of  strength. No reports of vertigo or dizziness; however, c/o of headaches 2-3x/week.   Pain level: current 2 /10, at best 0 /10, at worst 5 /10  Description of symptoms: Sharp pain in the suboccipitals and neck region with T/N in B hands and radiculopathy into R deltoid   Occupation: Retired   Leisure activities/Hobbies: Exercising at the gym, [employer]   Prior level of function: as good  Current limitations: Rotating the head in either direction, sleeping on the right side, looking straight ahead for more than 10 minutes  Pt goals: Rotating the head in either direction, sleeping on the right side, looking straight ahead for more than 10 minutes, reducing pain and radiculopathy  Red flag signs/symptoms: Pt denies dizziness, drop attacks, dysphagia, dysarthria, diplopia; Pt denies changes in bowel/bladder function, saddle anesthesia; Pt denies pain that wakes in sleep, fever, recent trauma, history of CA, pain unchanged with movement/activity    Past medical history was reviewed with Charlotte.  Significant findings include: See table below.  Imaging/Tests: Radiographs (2024): No acute fracture or traumatic listhesis of the cervical spine.   Multilevel degenerative changes of the cervical spine,  which are most advanced at C3-4 and C4-5 and have slightly progressed when compared to 2018.  Pt scheduled for a cervical MRI May 2025.   Charlotte  has a past medical history of Anxiety state, Asthmatic bronchitis without complication ([facility]) (2018), BPV (benign positional vertigo) (3/9/2022), Carpal tunnel syndrome (3/14/2016), Disorder of thyroid, Diverticular disease, DUB (dysfunctional uterine bleeding), Gastritis, Helicobacter pylori infection, High blood pressure, History of pregnancy (, , ), History of  (), Hyperlipemia, Observation for suspected condition, Osteoarthritis, Pap smear, as part of routine gynecological examination (2021), Right shoulder tendinitis (2021), Routine physical examination (2021), Screen for colon cancer (), Sleep apnea, Syncope and collapse (2018), Type II or unspecified type diabetes mellitus without mention of complication, not stated as uncontrolled (), and Visual impairment.  She  has a past surgical history that includes colonoscopy & polypectomy (2010); colonoscopy (); upper gi endoscopy,biopsy ();  (, ); endometrial bx conjunct w/colposcopy (); cholecystectomy (); carpal tunnel release (Right); and adj tiss xfer lid,nos,ear <10sqcm (Left, 10/15/2021).    ASSESSMENT  Charlotte presents to physical therapy evaluation with primary c/o B cervical pain and R-sided radiculopathy 1 month ago - both sides - down R arm. The results of the objective tests and measures show Decreased cervical ROM, UE/scapular strength, joint mobility restrictions, UE flexibility, abnormal posture. Functional deficits include but are not limited to Rotating the head in either direction, sleeping on the right side, looking straight ahead for more than 10 minutes. Signs and symptoms are consistent with diagnosis of Neck pain (M54.2), Cervical radiculopathy (M54.12). Pt and PT discussed evaluation findings, pathology,  POC and HEP.  Pt voiced understanding and performs HEP correctly without reported pain. Skilled Physical Therapy is medically necessary to address the above impairments and reach functional goals.    OBJECTIVE:    Musculoskeletal:  Observation/Posture: decreased cervical lordosis; forward head posture; stooped forward posture   Accessory Motion: Moderate hypomobility of the C3, C4, C5 segments in the P/A direction   Palpation: TTP: cervical paraspinals, suboccipitals, LS, UT, R lateral deltoid     Special tests:   NT     ROM and Strength:  (* denotes performed with pain)     Cervical ROM MMT (-/5)     Flex 80       Ext 30*      R L R L     Side bend 35* 45*         Rotation 45* 55*       ,   Shoulder   ROM MMT (-/5)    R L R L     Flex     4* 4     Ext     4 4     Abd (C5)     4* 4     IR     4* 4     ER     3+* 4     Low Trap n/a 3+ 3+     Mid Trap n/a 3 3     SA n/a         ,   Elbow   ROM MMT (-/5)    R L R L     Flex (C6)     5 5     Ext (C7)     5 5     Pronation             Supination           ,   Wrist   ROM MMT (-/5)    R L R L     Flex (C7)     4 4     Ext (C6)     4 4     Ulnar Dev             Radial Dev             Thumb Ext (C8)             Digit Flex (C8) n/a         Interossei (T1) n/a          (lbs) n/a         Pinch (lbs) n/a           Flexibility:  UE Flexibility R L     Upper Trap mod restricted mod restricted     Levator Scap mod restricted mod restricted     Pec Major mod restricted mod restricted     Scalenes mod restricted mod restricted     Latissimus mod restricted mod restricted     Bicep WNL WNL       Neurological:  Sensation: grossly intact to light touch OLEKSANDR UE/LE        Today's Treatment and Response:   Pt education was provided on exam findings, treatment diagnosis, treatment plan, expectations, and prognosis.  Today's Treatment       4/30/2025   Spine Treatment   Therapeutic Activity Minutes 10   Evaluation Minutes 20   Total Time Of Timed Procedures 10   Total Time Of  Service-Based Procedures 20   Total Treatment Time 30   HEP Access Code: [email or web address]  URL: [email or web address]  Date: 04/30/2025  Prepared by: Kelsea Kitcheni    Exercises  - Supine Chin Tuck  - 1 x daily - 7 x weekly - 2 sets - 10 reps - 3 second hold  - Seated Upper Trapezius Stretch  - 1 x daily - 7 x weekly - 3 sets - 30 second hold        Patient was instructed in and issued a HEP for: Access Code: [email or web address]  URL: [email or web address]  Date: 04/30/2025  Prepared by: Kelsea Garnicapuzi    Exercises  - Supine Chin Tuck  - 1 x daily - 7 x weekly - 2 sets - 10 reps - 3 second hold  - Seated Upper Trapezius Stretch  - 1 x daily - 7 x weekly - 3 sets - 30 second hold    Charges:  PT EVAL: Low Complexity,    In agreement with evaluation findings and clinical rationale, this evaluation involved LOW COMPLEXITY decision making due to no personal factors/comorbidities, 1-2 body structures involved/activity limitations, and stable symptoms as documented in the evaluation.                                                                         PLAN OF CARE:    Goals: (to be met in 8 visits)   Pt will improve cervical AROM flexion to 80+ degrees to improve tolerance for looking down to tie shoes   Pt will improve cervical AROM extension to 30+ degrees to improve tolerance for putting dishes into overhead cabinets   Pt will improve cervical AROM rotation to >R=L degrees to improve tolerance for turning head to check blind spot while driving  Pt will have improved thoracic PA mobility to WNL to improve cervical ROM as well as promote upright posturing and decreased pain with driving   Pt will report decreased frequency of headaches to <1x/week  Pt will demonstrate improved cervical intrinsic strength to 4+/5 to allow improved cervical stabilization with overhead reaching (8 visits)  Pt will improve postural strength (mid/low trap) to 4/5 to promote improved upright posturing and decreased pain with  turning the head   Pt will be independent and compliant with comprehensive HEP to maintain progress achieved in PT        Frequency / Duration: Patient will be seen 2x/weekx/week or a total of 8  visits over a 90 day period. Treatment will include: Manual Therapy; Mechanical Traction; Neuromuscular Re-education; Self-Care Home Management; Therapeutic Activities; Therapeutic Exercise; Home Exercise Program instruction; Electrical stimulation (unattended)    Education or treatment limitation: None   Rehab Potential: fair     Neck Disability Index    Question 4/30/2025  1:08 PM CDT - Filed by Patient   Pain Intensity The pain is very mild at the moment   Personal care I can look after myself normally without causing extra pain   Lifting I can lift heavy weights without extra pain   Reading I can read as much as I want with slight pain in my neck   Headaches I have slight headaches which come infrequently   Concentration I can concentrate fully when I want to with slight difficulty   Work I cannot do my usual work   Driving I can drive my car as long as I want with slight pain in my neck   Sleeping My sleep is mildly disturbed (1-2 hours sleepless)   Recreation I able to engage in all my recreational activities with some pain in my neck   Score (range: 0 - 100) 22       Patient/Family/Caregiver was advised of these findings, precautions, and treatment options and has agreed to actively participate in planning and for this course of care.    Thank you for your referral. Please co-sign or sign and return this letter via fax as soon as possible to [phone]. If you have any questions, please contact me at Dept: [phone]    Sincerely,  Electronically signed by therapist: Kelsea Peralta, PT  Physician's certification required: Yes  I certify the need for these services furnished under this plan of treatment and while under my care.    X___________________________________________________  Date____________________    Certification From: 4/30/2025  To:7/29/2025

## 2025-05-01 NOTE — TELEPHONE ENCOUNTER
Patient is requesting 90 day refill      Current Outpatient Medications:       hydrOXYzine 10 MG Oral Tab, Take 1 tablet (10 mg total) by mouth daily as needed for Itching., Disp: 30 tablet, Rfl: 0

## 2025-05-02 ENCOUNTER — OFFICE VISIT (OUTPATIENT)
Dept: PHYSICAL THERAPY | Age: 70
End: 2025-05-02
Attending: INTERNAL MEDICINE
Payer: MEDICARE

## 2025-05-02 DIAGNOSIS — M54.2 NECK PAIN: Primary | ICD-10-CM

## 2025-05-02 DIAGNOSIS — M54.12 CERVICAL RADICULOPATHY: ICD-10-CM

## 2025-05-02 PROCEDURE — 97012 MECHANICAL TRACTION THERAPY: CPT | Performed by: PHYSICAL THERAPIST

## 2025-05-02 PROCEDURE — 97140 MANUAL THERAPY 1/> REGIONS: CPT | Performed by: PHYSICAL THERAPIST

## 2025-05-02 PROCEDURE — 97110 THERAPEUTIC EXERCISES: CPT | Performed by: PHYSICAL THERAPIST

## 2025-05-02 RX ORDER — HYDROXYZINE HYDROCHLORIDE 10 MG/1
10 TABLET, FILM COATED ORAL DAILY PRN
Qty: 30 TABLET | Refills: 0 | Status: SHIPPED | OUTPATIENT
Start: 2025-05-02

## 2025-05-02 NOTE — TELEPHONE ENCOUNTER
Please Review. Protocol Failed; No Protocol     Requested Prescriptions   Pending Prescriptions Disp Refills    hydrOXYzine 10 MG Oral Tab 30 tablet 0     Sig: Take 1 tablet (10 mg total) by mouth daily as needed for Itching.       There is no refill protocol information for this order

## 2025-05-02 NOTE — PROGRESS NOTES
Patient: Charlotte Hernández (69 year old, female) Referring Provider:  Insurance:   Diagnosis: Neck pain (M54.2), Cervical radiculopathy (M54.12) Hadley Calhoun  MEDICARE   Date of Surgery: N/A Next MD visit:  [facility]   Precautions:  None 6/9/2025 Referral Information:    Date of Evaluation: Req: 0, Auth: 0, Exp:     04/30/25 POC Auth Visits:  8       Today's Date   5/2/2025    Subjective  Pt reports \"the neck is painful today, I did not sleep well, I have pain down the right arm and the hands are still numb, exercises are going well at home but the stretching was a little painful.\"       Pain: 5/10     Objective  See table below.          Assessment  Pt responded well to therapeutic interventions; however, TTP to the R UT and subocciptals. Initiated cervical traction today to help with radicular symptoms.    Goals (to be met in 8 visits)   Pt will improve cervical AROM flexion to 80+ degrees to improve tolerance for looking down to tie shoes   Pt will improve cervical AROM extension to 30+ degrees to improve tolerance for putting dishes into overhead cabinets   Pt will improve cervical AROM rotation to >R=L degrees to improve tolerance for turning head to check blind spot while driving  Pt will have improved thoracic PA mobility to WNL to improve cervical ROM as well as promote upright posturing and decreased pain with driving   Pt will report decreased frequency of headaches to <1x/week  Pt will demonstrate improved cervical intrinsic strength to 4+/5 to allow improved cervical stabilization with overhead reaching (8 visits)  Pt will improve postural strength (mid/low trap) to 4/5 to promote improved upright posturing and decreased pain with turning the head   Pt will be independent and compliant with comprehensive HEP to maintain progress achieved in PT          Plan  Progress skilled PT as tolerated to help improve cervical ROM and radiculopathy.    Treatment Last 4 Visits  Treatment Day: 2        4/30/2025 5/2/2025   Spine Treatment   Therapeutic Exercise  PROM: cervical in all directions  UT Stretch: 2x30\" R/L manually  Supine Chin Tucks: x20 with 3\" hold  Mulligan Mobilizations: x10 R/L cervical rotation  Standing Rows: x20 RTT  Standing Shoulder Extension: x20 YTT   Manual Therapy  STW: cervical paraspinals, SCM, LS, UT, suboccipital release, scalenes   Modalities  Mechanical Cervical Traction: x10' 15#   Therapeutic Exercise Minutes  10   Manual Therapy Minutes  20   Therapeutic Activity Minutes 10    Evaluation Minutes 20    Mechanical Traction Minutes  10   Total Time Of Timed Procedures 10 30   Total Time Of Service-Based Procedures 20 10   Total Treatment Time 30 40   HEP Access Code: [email or web address]  URL: [email or web address]  Date: 04/30/2025  Prepared by: Kelsea Peralta    Exercises  - Supine Chin Tuck  - 1 x daily - 7 x weekly - 2 sets - 10 reps - 3 second hold  - Seated Upper Trapezius Stretch  - 1 x daily - 7 x weekly - 3 sets - 30 second hold         HEP  Access Code: [email or web address]  URL: [email or web address]://[email or web address]/  Date: 04/30/2025  Prepared by: Kelsea Nahunpuzi    Exercises  - Supine Chin Tuck  - 1 x daily - 7 x weekly - 2 sets - 10 reps - 3 second hold  - Seated Upper Trapezius Stretch  - 1 x daily - 7 x weekly - 3 sets - 30 second hold    Charges      Therex: x1  Manual Therapy: x1  Mechanical Traction: x1

## 2025-05-05 NOTE — TELEPHONE ENCOUNTER
Hypothyroid Medications  Protocol Criteria:  Appointment scheduled in the past 12 months or the next 3 months  TSH resulted in the past 12 months that is normal  Recent Outpatient Visits            1 week ago Gastroesophageal reflux disease without esophag room air

## 2025-05-06 ENCOUNTER — OFFICE VISIT (OUTPATIENT)
Dept: PHYSICAL THERAPY | Age: 70
End: 2025-05-06
Attending: INTERNAL MEDICINE
Payer: MEDICARE

## 2025-05-06 DIAGNOSIS — M54.12 CERVICAL RADICULOPATHY: ICD-10-CM

## 2025-05-06 DIAGNOSIS — M54.2 NECK PAIN: Primary | ICD-10-CM

## 2025-05-06 PROCEDURE — 97012 MECHANICAL TRACTION THERAPY: CPT | Performed by: PHYSICAL THERAPIST

## 2025-05-06 PROCEDURE — 97110 THERAPEUTIC EXERCISES: CPT | Performed by: PHYSICAL THERAPIST

## 2025-05-06 PROCEDURE — 97140 MANUAL THERAPY 1/> REGIONS: CPT | Performed by: PHYSICAL THERAPIST

## 2025-05-06 NOTE — PROGRESS NOTES
Patient: Charlotte Hernández (69 year old, female) Referring Provider:  Insurance:   Diagnosis: Neck pain (M54.2), Cervical radiculopathy (M54.12) Hadley Calhoun MD MEDICARE   Date of Surgery: N/A Next MD visit:  [facility]   Precautions:  None 6/9/2025 Referral Information:    Date of Evaluation: Req: 0, Auth: 0, Exp:     04/30/25 POC Auth Visits:  8       Today's Date   5/6/2025    Subjective  Pt reports \"my neck pain was bad last night - I did not do anything in particular to aggravate it - turning my head to either side is painful and I continue to feel radiculopathy into the upper R arm.\"       Pain: 4/10     Objective  See table below.         Assessment  Pt responded well to therapeutic interventions; however, TTP to the R UT and suboccipitals. Continued with cervical traction today to help with radicular symptoms. Updated HEP to include scapular stabilization and strengthening.    Goals (to be met in 8 visits)   Pt will improve cervical AROM flexion to 80+ degrees to improve tolerance for looking down to tie shoes   Pt will improve cervical AROM extension to 30+ degrees to improve tolerance for putting dishes into overhead cabinets   Pt will improve cervical AROM rotation to >R=L degrees to improve tolerance for turning head to check blind spot while driving  Pt will have improved thoracic PA mobility to WNL to improve cervical ROM as well as promote upright posturing and decreased pain with driving   Pt will report decreased frequency of headaches to <1x/week  Pt will demonstrate improved cervical intrinsic strength to 4+/5 to allow improved cervical stabilization with overhead reaching (8 visits)  Pt will improve postural strength (mid/low trap) to 4/5 to promote improved upright posturing and decreased pain with turning the head   Pt will be independent and compliant with comprehensive HEP to maintain progress achieved in PT            Plan  Progress skilled PT as tolerated to help improve cervical  ROM and radiculopathy.    Treatment Last 4 Visits  Treatment Day: 3       4/30/2025 5/2/2025 5/6/2025   Spine Treatment   Therapeutic Exercise  PROM: cervical in all directions  UT Stretch: 2x30\" R/L manually  Supine Chin Tucks: x20 with 3\" hold  Mulligan Mobilizations: x10 R/L cervical rotation  Standing Rows: x20 RTT  Standing Shoulder Extension: x20 YTT PROM: cervical in all directions  UT Stretch: 2x30\" R/L manually  Supine Chin Tucks: x20 with 3\" hold  Mulligan Mobilizations: x10 R/L cervical rotation  Standing Rows: x20 RTT  Standing Shoulder Extension: x20 YTT   Manual Therapy  STW: cervical paraspinals, SCM, LS, UT, suboccipital release, scalenes STW: cervical paraspinals, SCM, LS, UT, suboccipital release, scalenes   Modalities  Mechanical Cervical Traction: x10' 15# Mechanical Cervical Traction: x10' 8#   Therapeutic Exercise Minutes  10 10   Manual Therapy Minutes  20 20   Therapeutic Activity Minutes 10     Evaluation Minutes 20     Mechanical Traction Minutes  10 10   Total Time Of Timed Procedures 10 30 30   Total Time Of Service-Based Procedures 20 10 10   Total Treatment Time 30 40 40   HEP Access Code: [email or web address]  URL: [email or web address]  Date: 04/30/2025  Prepared by: Kelsea Karpuzi    Exercises  - Supine Chin Tuck  - 1 x daily - 7 x weekly - 2 sets - 10 reps - 3 second hold  - Seated Upper Trapezius Stretch  - 1 x daily - 7 x weekly - 3 sets - 30 second hold  Access Code: [email or web address]  URL: [email or web address]://[email or web address]/  Date: 05/06/2025  Prepared by: Kelsea Karpuzi    Exercises  - Standing Row with Anchored Resistance  - 1 x daily - 7 x weekly - 2 sets - 10 reps  - Shoulder extension with resistance - Neutral  - 1 x daily - 7 x weekly - 2 sets - 10 reps        HEP  Access Code: [email or web address]  URL: [email or web address]/  Date: 05/06/2025  Prepared by: Kelsea Karpuzi    Exercises  - Standing Row with Anchored Resistance  - 1 x daily - 7 x weekly - 2 sets - 10  reps  - Shoulder extension with resistance - Neutral  - 1 x daily - 7 x weekly - 2 sets - 10 reps    Charges        Therex: x1  Manual Therapy: x1  Mechanical Traction: x1

## 2025-05-08 ENCOUNTER — OFFICE VISIT (OUTPATIENT)
Dept: PHYSICAL THERAPY | Age: 70
End: 2025-05-08
Attending: INTERNAL MEDICINE
Payer: MEDICARE

## 2025-05-08 DIAGNOSIS — M54.2 NECK PAIN: Primary | ICD-10-CM

## 2025-05-08 DIAGNOSIS — M54.12 CERVICAL RADICULOPATHY: ICD-10-CM

## 2025-05-08 PROCEDURE — 97140 MANUAL THERAPY 1/> REGIONS: CPT | Performed by: PHYSICAL THERAPIST

## 2025-05-08 PROCEDURE — 97012 MECHANICAL TRACTION THERAPY: CPT | Performed by: PHYSICAL THERAPIST

## 2025-05-08 PROCEDURE — 97110 THERAPEUTIC EXERCISES: CPT | Performed by: PHYSICAL THERAPIST

## 2025-05-08 NOTE — PROGRESS NOTES
Patient: Charlotte Hernández (69 year old, female) Referring Provider:  Insurance:   Diagnosis: Neck pain (M54.2), Cervical radiculopathy (M54.12) Hadley Calhoun  MEDICARE   Date of Surgery: N/A Next MD visit:  [facility]   Precautions:  None 6/9/2025 Referral Information:    Date of Evaluation: Req: 0, Auth: 0, Exp:     04/30/25 POC Auth Visits:  8       Today's Date   5/8/2025    Subjective  Pt reports \"the neck pain has improved a little bit and I was able to sleep better; however, I am still getting headaches and pain in the right upper arm.\"       Pain: 2/10     Objective  See table below.          Assessment  Pt responded well to therapeutic interventions; however, TTP to the R UT and suboccipitals. Continued with cervical traction today to help with radicular symptoms. Updated HEP to include subocciptal release.    Goals (to be met in 8 visits)   Pt will improve cervical AROM flexion to 80+ degrees to improve tolerance for looking down to tie shoes   Pt will improve cervical AROM extension to 30+ degrees to improve tolerance for putting dishes into overhead cabinets   Pt will improve cervical AROM rotation to >R=L degrees to improve tolerance for turning head to check blind spot while driving  Pt will have improved thoracic PA mobility to WNL to improve cervical ROM as well as promote upright posturing and decreased pain with driving   Pt will report decreased frequency of headaches to <1x/week  Pt will demonstrate improved cervical intrinsic strength to 4+/5 to allow improved cervical stabilization with overhead reaching (8 visits)  Pt will improve postural strength (mid/low trap) to 4/5 to promote improved upright posturing and decreased pain with turning the head   Pt will be independent and compliant with comprehensive HEP to maintain progress achieved in PT              Plan  Progress skilled PT as tolerated to help improve cervical ROM and radiculopathy.    Treatment Last 4 Visits  Treatment Day:  4       4/30/2025 5/2/2025 5/6/2025 5/8/2025   Spine Treatment   Therapeutic Exercise  PROM: cervical in all directions  UT Stretch: 2x30\" R/L manually  Supine Chin Tucks: x20 with 3\" hold  Mulligan Mobilizations: x10 R/L cervical rotation  Standing Rows: x20 RTT  Standing Shoulder Extension: x20 YTT PROM: cervical in all directions  UT Stretch: 2x30\" R/L manually  Supine Chin Tucks: x20 with 3\" hold  Mulligan Mobilizations: x10 R/L cervical rotation  Standing Rows: x20 RTT  Standing Shoulder Extension: x20 YTT PROM: cervical in all directions  UT Stretch: 2x30\" R/L manually  Seated Chin Tucks: x20 with 3\" hold  Mulligan Mobilizations: x10 R/L cervical rotation  Standing Rows: x20 RTT  Standing Shoulder Extension: x20 YTT  Seated Scapular ER: x15 YTB   Manual Therapy  STW: cervical paraspinals, SCM, LS, UT, suboccipital release, scalenes STW: cervical paraspinals, SCM, LS, UT, suboccipital release, scalenes STW: cervical paraspinals, SCM, LS, UT, suboccipital release, scalenes   Modalities  Mechanical Cervical Traction: x10' 15# Mechanical Cervical Traction: x10' 8# Mechanical Cervical Traction: x10' 15#   Therapeutic Exercise Minutes  10 10 10   Manual Therapy Minutes  20 20 20   Therapeutic Activity Minutes 10      Ultrasound Minutes    10   Evaluation Minutes 20      Mechanical Traction Minutes  10 10    Total Time Of Timed Procedures 10 30 30 40   Total Time Of Service-Based Procedures 20 10 10 0   Total Treatment Time 30 40 40 40   HEP Access Code: [email or web address]  URL: [email or web address]  Date: 04/30/2025  Prepared by: Kelsea Peralta    Exercises  - Supine Chin Tuck  - 1 x daily - 7 x weekly - 2 sets - 10 reps - 3 second hold  - Seated Upper Trapezius Stretch  - 1 x daily - 7 x weekly - 3 sets - 30 second hold  Access Code: [email or web address]  URL: [email or web address]  Date: 05/06/2025  Prepared by: Kelsea Peralta    Exercises  - Standing Row with Anchored Resistance  - 1 x daily - 7 x  weekly - 2 sets - 10 reps  - Shoulder extension with resistance - Neutral  - 1 x daily - 7 x weekly - 2 sets - 10 reps Access Code: [email or web address]  URL: [email or web address]/  Date: 05/08/2025  Prepared by: Kelsea Peralta    Exercises  - Supine Suboccipital Release with Tennis Balls  - 1 x daily - 7 x weekly - 5-10 minutes hold        HEP  Access Code: [email or web address]  URL: [email or web address]/  Date: 05/08/2025  Prepared by: Kelsea Peralta    Exercises  - Supine Suboccipital Release with Tennis Balls  - 1 x daily - 7 x weekly - 5-10 minutes hold    Charges   Therex: x1  Manual Therapy: x1  Mechanical Traction: x1

## 2025-05-12 ENCOUNTER — TELEPHONE (OUTPATIENT)
Dept: PHYSICAL MEDICINE AND REHAB | Facility: CLINIC | Age: 70
End: 2025-05-12

## 2025-05-12 ENCOUNTER — HOSPITAL ENCOUNTER (OUTPATIENT)
Dept: MRI IMAGING | Age: 70
Discharge: HOME | End: 2025-05-12
Attending: INTERNAL MEDICINE
Payer: MEDICARE

## 2025-05-12 ENCOUNTER — OFFICE VISIT (OUTPATIENT)
Dept: PHYSICAL MEDICINE AND REHAB | Facility: CLINIC | Age: 70
End: 2025-05-12
Payer: MEDICARE

## 2025-05-12 VITALS — HEIGHT: 62 IN | WEIGHT: 145 LBS | BODY MASS INDEX: 26.68 KG/M2

## 2025-05-12 DIAGNOSIS — F32.A MILD DEPRESSIVE DISORDER: ICD-10-CM

## 2025-05-12 DIAGNOSIS — E11.9 DIABETES MELLITUS TYPE 2, INSULIN DEPENDENT (HCC): ICD-10-CM

## 2025-05-12 DIAGNOSIS — M54.12 CERVICAL RADICULOPATHY: ICD-10-CM

## 2025-05-12 DIAGNOSIS — K21.9 GASTROESOPHAGEAL REFLUX DISEASE WITHOUT ESOPHAGITIS: ICD-10-CM

## 2025-05-12 DIAGNOSIS — Z79.4 DIABETES MELLITUS TYPE 2, INSULIN DEPENDENT (HCC): ICD-10-CM

## 2025-05-12 DIAGNOSIS — M54.2 NECK PAIN: ICD-10-CM

## 2025-05-12 DIAGNOSIS — M15.9 GENERALIZED OSTEOARTHROSIS, INVOLVING MULTIPLE SITES: Primary | ICD-10-CM

## 2025-05-12 DIAGNOSIS — M17.11 PRIMARY OSTEOARTHRITIS OF RIGHT KNEE: ICD-10-CM

## 2025-05-12 PROCEDURE — 72141 MRI NECK SPINE W/O DYE: CPT | Performed by: INTERNAL MEDICINE

## 2025-05-12 RX ORDER — LIDOCAINE HYDROCHLORIDE 10 MG/ML
4 INJECTION, SOLUTION INFILTRATION; PERINEURAL ONCE
Status: COMPLETED | OUTPATIENT
Start: 2025-05-12 | End: 2025-05-12

## 2025-05-12 RX ORDER — TRIAMCINOLONE ACETONIDE 40 MG/ML
80 INJECTION, SUSPENSION INTRA-ARTICULAR; INTRAMUSCULAR ONCE
Status: COMPLETED | OUTPATIENT
Start: 2025-05-12 | End: 2025-05-12

## 2025-05-12 RX ADMIN — LIDOCAINE HYDROCHLORIDE 4 ML: 10 INJECTION, SOLUTION INFILTRATION; PERINEURAL at 11:11:00

## 2025-05-12 RX ADMIN — TRIAMCINOLONE ACETONIDE 80 MG: 40 INJECTION, SUSPENSION INTRA-ARTICULAR; INTRAMUSCULAR at 11:11:00

## 2025-05-12 NOTE — PROGRESS NOTES
[facility]  Progress Note    CHIEF COMPLAINT:    Chief Complaint   Patient presents with    Knee Pain     New patient c/o right knee pain that started about 1 month ago without injury. Pain is sharp, worse when standing long periods. Denies N/T. Denies PT for this, currently in PT for her neck. Using a cream for her knee at night which helps. Right knee xray 4/17/25. LOP 9/10       History of Present Illness:  Charlotte Hernández is a 69 year old female who is being seen in consultation at the request of Dr. Calhoun.  She is referred for neck pain.  She had a cervical MRI this a.m.  It is getting better with physical therapy she also complains of knee pain at night with Voltaren gel, it hurts to stand for long periods, notices it at [employer].  Recent x-ray shows moderate degenerative changes.  She also complains of shoulder pain at times. No numbness or tingling.  She is a retired patient care tech.      PAST MEDICAL HISTORY:  Past Medical History[1]    SURGICAL HISTORY:  Past Surgical History[2]    SOCIAL HISTORY:   Social History     Occupational History    Not on file   Tobacco Use    Smoking status: Never    Smokeless tobacco: Never   Vaping Use    Vaping status: Never Used   Substance and Sexual Activity    Alcohol use: No     Alcohol/week: 0.0 standard drinks of alcohol    Drug use: No    Sexual activity: Not Currently     Partners: Male       CURRENT MEDICATIONS:   Current Medications[3]    ALLERGIES:   Allergies[4]    REVIEW OF SYSTEMS:   No patient-reported data collected this visit.            PHYSICAL EXAM:   Ht 62\"   Wt 145 lb (65.8 kg)   LMP  (LMP Unknown)   BMI 26.52 kg/m²     Body mass index is 26.52 kg/m².      General: No immediate distress  Head: Normocephalic/ Atraumatic  Extremities: No lower extremity edema bilaterally. Peripheral pulses intact.   Spine: full and painfree cervical ROM in all directions without pain  Shoulders: Positive impingement  signs on the right  Knees: Limited right knee flexion with pain at end range  Neuro:   Cognition: alert & oriented x 3, attentive, able to follow 2 step commands, comprehention intact, spontaneous speech intact  Strength: Lower extremities have 5/5 strength  Sensation: Normal lower extremities    Data    Radiology Imagin.  PACS unavailable but a plain film x-ray from  of the cervical spine shows multilevel degenerative changes.  2.  I personally reviewed a cervical MRI performed this morning showing mild widespread disc bulges with no impingement of the central canal.  There is slight left C6-7 foraminal narrowing due to a broad disc osteophyte.  Other levels are normal.  Relatively unremarkable MRI of the cervical spine for age.      ASSESSMENT AND PLAN:  1. Generalized osteoarthrosis, involving multiple sites  She seems to have generalized osteoarthritis.  She has intermittent aches and pains of various joints.  Given the change of weather, symptoms are worse.  They should improve once Summer comes.  Else she may be a candidate for Cymbalta.    2. Primary osteoarthritis of right knee  This is the worst problem.  She is requesting an injection.  She had 1 in the past that worked.  I think that is reasonable.    3. Diabetes mellitus type 2, insulin dependent (HCC)  Caution with blood sugars, last hemoglobin A1c was 7.7    4. Gastroesophageal reflux disease without esophagitis  No NSAIDs limits treatment options    5. Mild depressive disorder (HCC)  Negative comorbidity for painful conditions.  If symptoms continue, consider Cymbalta            The patient was in agreement with the assessment and plan.  All questions were answered.        Mo Mak MD  Physical Medicine and Rehabilitation/Sports Medicine  [facility]         [1]   Past Medical History:   Anxiety state    Asthmatic bronchitis without complication (HCC)    BPV (benign positional vertigo)    Carpal tunnel syndrome     Disorder of thyroid    Diverticular disease    DUB (dysfunctional uterine bleeding)    Endometrial biopsy in     Gastritis    Helicobacter pylori infection    EGD with Biopsy in     High blood pressure    History of pregnancy     in  and ,  in     History of     Hyperlipemia    Observation for suspected condition    Osteoarthritis    Pap smear, as part of routine gynecological examination    Right shoulder tendinitis    Routine physical examination    Screen for colon cancer    repeat CLN in 10 years    Sleep apnea    Syncope and collapse    Type II or unspecified type diabetes mellitus without mention of complication, not stated as uncontrolled    Visual impairment    Readers   [2]   Past Surgical History:  Procedure Laterality Date    Adj tiss xfer lid,nos,ear <10sqcm Left 10/15/2021    L ear lobular reconstruction with flap      ,     Carpal tunnel release Right     Cholecystectomy      Colonoscopy      Negative    Colonoscopy & polypectomy  2010    Colonoscopy with Polypectomy and Biopsy, per NG    Endometrial bx conjunct w/colposcopy      Upper gi endoscopy,biopsy      EGD with Biopsy   [3]   Current Outpatient Medications   Medication Sig Dispense Refill    hydrOXYzine 10 MG Oral Tab Take 1 tablet (10 mg total) by mouth daily as needed for Itching. 30 tablet 0    metoprolol succinate ER 50 MG Oral Tablet 24 Hr Take 1 tablet (50 mg total) by mouth daily. 90 tablet 1    metFORMIN  MG Oral Tablet 24 Hr Take 2 tablets (1,000 mg total) by mouth daily with dinner. 180 tablet 3    Glucose Blood (ONETOUCH ULTRA) In Vitro Strip 1 each by In Vitro route 2 (two) times daily. 200 strip 3    OneTouch Delica Lancets 33G Does not apply Misc 1 Lancet by Finger stick route 2 (two) times daily. 200 each 3    pantoprazole 40 MG Oral Tab EC Take 1 tablet (40 mg total) by mouth before breakfast. 90 tablet 3    atorvastatin 10 MG Oral Tab Take 1  tablet (10 mg total) by mouth nightly. 90 tablet 3    NovoLOG Mix 70/30 FlexPen 100 UNIT/ML Susp Pen-injector (Insulin Aspart Prot & Aspart 70/30) INJECT 34 UNITS SUBCUTANEOUS IN THE MORNING AND 24 UNITS AT NIGHT. 51 mL 3    clotrimazole 1 % External Cream Apply 1 Application topically 2 (two) times daily. APPLY TO AFFECTED AREA 15 g 0    Hydroquinone 4 % External Cream Apply 1 Application topically 2 (two) times daily. Use bid as directed to face 30 g 1    ketoconazole 2 % External Shampoo Apply to scalp 2 times per week. 120 mL 11    Halobetasol Propionate 0.05 % External Ointment Apply a thin layer to affected area on back bid 50 g 2    fluocinonide 0.05 % External Cream Use twice daily on hands as directed 60 g 3    MAGNESIUM OXIDE 400 MG Oral Tab TAKE 1 TABLET BY MOUTH EVERY DAY 90 tablet 3    losartan 100 MG Oral Tab Take 1 tablet (100 mg total) by mouth daily. 90 tablet 3    levothyroxine 50 MCG Oral Tab Take 1 tablet (50 mcg total) by mouth before breakfast. 90 tablet 3    Insulin Pen Needle (BD PEN NEEDLE CARY 2ND GEN) 32G X 4 MM Does not apply Misc Use as directed twice a day 200 each 3    amLODIPine 10 MG Oral Tab Take 1 tablet (10 mg total) by mouth daily. 90 tablet 3    Dulaglutide (TRULICITY) 0.75 MG/0.5ML Subcutaneous Solution Pen-injector Inject 0.75 mg into the skin once a week. 6 each 3    betamethasone dipropionate 0.05 % External Cream Apply 1 Application topically 2 (two) times daily. 15 g 0    hydrOXYzine 10 MG Oral Tab Take 1 tablet (10 mg total) by mouth every other day. 45 tablet 3   [4] No Known Allergies

## 2025-05-12 NOTE — TELEPHONE ENCOUNTER
Per CMS Guidelines -no authorization is required for Right knee injection.  CPT codes: 25489,   Completed in the office today.     Status: Authorization is not required based on medical necessity however is not a guarantee of payment and may be subject to review once claim is submitted.

## 2025-05-13 ENCOUNTER — OFFICE VISIT (OUTPATIENT)
Dept: PHYSICAL THERAPY | Age: 70
End: 2025-05-13
Attending: INTERNAL MEDICINE
Payer: MEDICARE

## 2025-05-13 DIAGNOSIS — M54.12 CERVICAL RADICULOPATHY: ICD-10-CM

## 2025-05-13 DIAGNOSIS — M54.2 NECK PAIN: Primary | ICD-10-CM

## 2025-05-13 PROCEDURE — 97140 MANUAL THERAPY 1/> REGIONS: CPT | Performed by: PHYSICAL THERAPIST

## 2025-05-13 PROCEDURE — 97012 MECHANICAL TRACTION THERAPY: CPT | Performed by: PHYSICAL THERAPIST

## 2025-05-13 PROCEDURE — 97530 THERAPEUTIC ACTIVITIES: CPT | Performed by: PHYSICAL THERAPIST

## 2025-05-13 NOTE — PROGRESS NOTES
Patient: Charlotte Hernández (69 year old, female) Referring Provider:  Insurance:   Diagnosis: Neck pain (M54.2), Cervical radiculopathy (M54.12) Hadley Calhoun  MEDICARE   Date of Surgery: N/A Next MD visit:  [facility]   Precautions:  None 6/9/2025 Referral Information:    Date of Evaluation: Req: 0, Auth: 0, Exp:     04/30/25 POC Auth Visits:  8       Today's Date   5/13/2025    Subjective  Pt reports \"the neck pain has improved quite a bit; however, I continue to get headaches everyday.\"       Pain: 0/10     Objective  See table below.     Assessment  Pt responded well to therapeutic interventions; however, continued TTP to the R UT and suboccipitals. Continued with cervical traction today to help with radicular symptoms. Updated HEP to include subocciptal release due to improving symptoms.    Goals (to be met in 8 visits)   Pt will improve cervical AROM flexion to 80+ degrees to improve tolerance for looking down to tie shoes   Pt will improve cervical AROM extension to 30+ degrees to improve tolerance for putting dishes into overhead cabinets   Pt will improve cervical AROM rotation to >R=L degrees to improve tolerance for turning head to check blind spot while driving  Pt will have improved thoracic PA mobility to WNL to improve cervical ROM as well as promote upright posturing and decreased pain with driving   Pt will report decreased frequency of headaches to <1x/week  Pt will demonstrate improved cervical intrinsic strength to 4+/5 to allow improved cervical stabilization with overhead reaching (8 visits)  Pt will improve postural strength (mid/low trap) to 4/5 to promote improved upright posturing and decreased pain with turning the head   Pt will be independent and compliant with comprehensive HEP to maintain progress achieved in PT                Plan  Progress skilled PT as tolerated to help improve cervical ROM and radiculopathy.    Treatment Last 4 Visits  Treatment Day: 5       5/2/2025  5/6/2025 5/8/2025 5/13/2025   Spine Treatment   Therapeutic Exercise PROM: cervical in all directions  UT Stretch: 2x30\" R/L manually  Supine Chin Tucks: x20 with 3\" hold  Mulligan Mobilizations: x10 R/L cervical rotation  Standing Rows: x20 RTT  Standing Shoulder Extension: x20 YTT PROM: cervical in all directions  UT Stretch: 2x30\" R/L manually  Supine Chin Tucks: x20 with 3\" hold  Mulligan Mobilizations: x10 R/L cervical rotation  Standing Rows: x20 RTT  Standing Shoulder Extension: x20 YTT PROM: cervical in all directions  UT Stretch: 2x30\" R/L manually  Seated Chin Tucks: x20 with 3\" hold  Mulligan Mobilizations: x10 R/L cervical rotation  Standing Rows: x20 RTT  Standing Shoulder Extension: x20 YTT  Seated Scapular ER: x15 YTB PROM: cervical in all directions  UT Stretch: 2x30\" R/L manually  Seated Chin Tucks: x20 with 3\" hold  Mulligan Mobilizations: x10 R/L cervical rotation  Standing Rows: x20 RTT  Standing Shoulder Extension: x20 YTT  Seated Scapular ER: x15 YTB   Manual Therapy STW: cervical paraspinals, SCM, LS, UT, suboccipital release, scalenes STW: cervical paraspinals, SCM, LS, UT, suboccipital release, scalenes STW: cervical paraspinals, SCM, LS, UT, suboccipital release, scalenes STW: cervical paraspinals, SCM, LS, UT, suboccipital release, scalenes   Modalities Mechanical Cervical Traction: x10' 15# Mechanical Cervical Traction: x10' 8# Mechanical Cervical Traction: x10' 15# Mechanical Cervical Traction: x10' 18#   Therapeutic Exercise Minutes 10 10 10 10   Manual Therapy Minutes 20 20 20 20   Ultrasound Minutes   10    Mechanical Traction Minutes 10 10  10   Total Time Of Timed Procedures 30 30 40 30   Total Time Of Service-Based Procedures 10 10 0 10   Total Treatment Time 40 40 40 40   HEP  Access Code: [email or web address]  URL: [email or web address]  Date: 05/06/2025  Prepared by: Kelsea Davey  - Standing Row with Anchored Resistance  - 1 x daily - 7 x weekly - 2 sets - 10  reps  - Shoulder extension with resistance - Neutral  - 1 x daily - 7 x weekly - 2 sets - 10 reps Access Code: [email or web address]  URL: [email or web address]  Date: 05/08/2025  Prepared by: Kelsea Peralta    Exercises  - Supine Suboccipital Release with Tennis Balls  - 1 x daily - 7 x weekly - 5-10 minutes hold         HEP  Access Code: [email or web address]  URL: [email or web address]  Date: 05/08/2025  Prepared by: Kelsea Peralta    Exercises  - Supine Suboccipital Release with Tennis Balls  - 1 x daily - 7 x weekly - 5-10 minutes hold    Charges   Therex: x1  Manual Therapy: x1  Mechanical Traction: x1

## 2025-05-15 ENCOUNTER — OFFICE VISIT (OUTPATIENT)
Dept: PHYSICAL THERAPY | Age: 70
End: 2025-05-15
Attending: INTERNAL MEDICINE
Payer: MEDICARE

## 2025-05-15 DIAGNOSIS — M54.12 CERVICAL RADICULOPATHY: ICD-10-CM

## 2025-05-15 DIAGNOSIS — M54.2 NECK PAIN: Primary | ICD-10-CM

## 2025-05-15 PROCEDURE — 97012 MECHANICAL TRACTION THERAPY: CPT | Performed by: PHYSICAL THERAPIST

## 2025-05-15 PROCEDURE — 97140 MANUAL THERAPY 1/> REGIONS: CPT | Performed by: PHYSICAL THERAPIST

## 2025-05-15 PROCEDURE — 97110 THERAPEUTIC EXERCISES: CPT | Performed by: PHYSICAL THERAPIST

## 2025-05-15 NOTE — PROGRESS NOTES
Patient: Charlotte Hernández (69 year old, female) Referring Provider:  Insurance:   Diagnosis: Neck pain (M54.2), Cervical radiculopathy (M54.12) Hadley Calhoun  MEDICARE   Date of Surgery: N/A Next [facility]:  [facility]   Precautions:  None 6/9/2025 Referral Information:    Date of Evaluation: Req: 0, Auth: 0, Exp:     04/30/25 POC Auth Visits:  8       Today's Date   5/15/2025    Subjective  Pt reports \"the neck pain has improved quite a bit; however, I continue to get headaches everyday.\"       Pain: 0/10     Objective  See table below.         Assessment  Pt responded well to therapeutic interventions; however, continued TTP to the R UT and suboccipitals. Continued with cervical traction today to help with radicular symptoms. Updated HEP to include subocciptal release due to improving symptoms.    Goals (to be met in 8 visits)   Pt will improve cervical AROM flexion to 80+ degrees to improve tolerance for looking down to tie shoes   Pt will improve cervical AROM extension to 30+ degrees to improve tolerance for putting dishes into overhead cabinets   Pt will improve cervical AROM rotation to >R=L degrees to improve tolerance for turning head to check blind spot while driving  Pt will have improved thoracic PA mobility to WNL to improve cervical ROM as well as promote upright posturing and decreased pain with driving   Pt will report decreased frequency of headaches to <1x/week  Pt will demonstrate improved cervical intrinsic strength to 4+/5 to allow improved cervical stabilization with overhead reaching (8 visits)  Pt will improve postural strength (mid/low trap) to 4/5 to promote improved upright posturing and decreased pain with turning the head   Pt will be independent and compliant with comprehensive HEP to maintain progress achieved in PT                  Plan  Progress skilled PT as tolerated to help improve cervical ROM and radiculopathy.    Treatment Last 4 Visits  Treatment Day: 6       5/6/2025  5/8/2025 5/13/2025 5/15/2025   Spine Treatment   Therapeutic Exercise PROM: cervical in all directions  UT Stretch: 2x30\" R/L manually  Supine Chin Tucks: x20 with 3\" hold  Mulligan Mobilizations: x10 R/L cervical rotation  Standing Rows: x20 RTT  Standing Shoulder Extension: x20 YTT PROM: cervical in all directions  UT Stretch: 2x30\" R/L manually  Seated Chin Tucks: x20 with 3\" hold  Mulligan Mobilizations: x10 R/L cervical rotation  Standing Rows: x20 RTT  Standing Shoulder Extension: x20 YTT  Seated Scapular ER: x15 YTB PROM: cervical in all directions  UT Stretch: 2x30\" R/L manually  Seated Chin Tucks: x20 with 3\" hold  Mulligan Mobilizations: x10 R/L cervical rotation  Standing Rows: x20 RTT  Standing Shoulder Extension: x20 YTT  Seated Scapular ER: x15 YTB PROM: cervical in all directions  UT Stretch: 2x30\" R/L manually  Seated Chin Tucks: x20 with 3\" hold  Mulligan Mobilizations: x10 R/L cervical rotation  Standing Rows: x20 RTT  Standing Shoulder Extension: x20 YTT  Seated Scapular ER: x15 YTB   Manual Therapy STW: cervical paraspinals, SCM, LS, UT, suboccipital release, scalenes STW: cervical paraspinals, SCM, LS, UT, suboccipital release, scalenes STW: cervical paraspinals, SCM, LS, UT, suboccipital release, scalenes STW: cervical paraspinals, SCM, LS, UT, suboccipital release, scalenes   Modalities Mechanical Cervical Traction: x10' 8# Mechanical Cervical Traction: x10' 15# Mechanical Cervical Traction: x10' 18# Mechanical Cervical Traction: x10' 18#   Therapeutic Exercise Minutes 10 10 10 10   Manual Therapy Minutes 20 20 20 20   Ultrasound Minutes  10     Mechanical Traction Minutes 10  10 10   Total Time Of Timed Procedures 30 40 30 30   Total Time Of Service-Based Procedures 10 0 10 10   Total Treatment Time 40 40 40 40   HEP Access Code: [email or web address]  URL: [email or web address]  Date: 05/06/2025  Prepared by: Kelsea Peralta    Exercises  - Standing Row with Anchored Resistance  - 1 x  daily - 7 x weekly - 2 sets - 10 reps  - Shoulder extension with resistance - Neutral  - 1 x daily - 7 x weekly - 2 sets - 10 reps Access Code: [email or web address]  URL: [email or web address]  Date: 05/08/2025  Prepared by: Kelsea Peralta    Exercises  - Supine Suboccipital Release with Tennis Balls  - 1 x daily - 7 x weekly - 5-10 minutes hold          HEP  Access Code: [email or web address]  URL: [email or web address]  Date: 05/08/2025  Prepared by: Kelsea Peralta    Exercises  - Supine Suboccipital Release with Tennis Balls  - 1 x daily - 7 x weekly - 5-10 minutes hold    Charges      Therex: x1  Manual Therapy: x1  Mechanical Traction: x1

## 2025-05-20 ENCOUNTER — APPOINTMENT (OUTPATIENT)
Dept: PHYSICAL THERAPY | Age: 70
End: 2025-05-20
Attending: INTERNAL MEDICINE
Payer: MEDICARE

## 2025-05-22 ENCOUNTER — OFFICE VISIT (OUTPATIENT)
Dept: PHYSICAL THERAPY | Age: 70
End: 2025-05-22
Attending: INTERNAL MEDICINE
Payer: MEDICARE

## 2025-05-22 DIAGNOSIS — M54.2 NECK PAIN: Primary | ICD-10-CM

## 2025-05-22 DIAGNOSIS — M54.12 CERVICAL RADICULOPATHY: ICD-10-CM

## 2025-05-22 PROCEDURE — 97012 MECHANICAL TRACTION THERAPY: CPT | Performed by: PHYSICAL THERAPIST

## 2025-05-22 PROCEDURE — 97110 THERAPEUTIC EXERCISES: CPT | Performed by: PHYSICAL THERAPIST

## 2025-05-22 PROCEDURE — 97140 MANUAL THERAPY 1/> REGIONS: CPT | Performed by: PHYSICAL THERAPIST

## 2025-05-22 NOTE — PROGRESS NOTES
Patient: Charlotte Hernández (69 year old, female) Referring Provider:  Insurance:   Diagnosis: Neck pain (M54.2), Cervical radiculopathy (M54.12) Hadley Calhoun  MEDICARE   Date of Surgery: N/A Next MD visit:  [facility]   Precautions:  None 6/9/2025 Referral Information:    Date of Evaluation: Req: 0, Auth: 0, Exp:     04/30/25 POC Auth Visits:  8       Today's Date   5/22/2025    Subjective  Pt reports \"my neck was painful the night after PT especially on the right side.\"       Pain: 4/10       Objective  See table below.         Assessment  Pt responded well to therapeutic interventions; however, continued TTP to the R UT and suboccipitals. Continued with cervical traction today to help with radicular symptoms.    Goals (to be met in 8 visits)   Pt will improve cervical AROM flexion to 80+ degrees to improve tolerance for looking down to tie shoes   Pt will improve cervical AROM extension to 30+ degrees to improve tolerance for putting dishes into overhead cabinets   Pt will improve cervical AROM rotation to >R=L degrees to improve tolerance for turning head to check blind spot while driving  Pt will have improved thoracic PA mobility to WNL to improve cervical ROM as well as promote upright posturing and decreased pain with driving   Pt will report decreased frequency of headaches to <1x/week  Pt will demonstrate improved cervical intrinsic strength to 4+/5 to allow improved cervical stabilization with overhead reaching (8 visits)  Pt will improve postural strength (mid/low trap) to 4/5 to promote improved upright posturing and decreased pain with turning the head   Pt will be independent and compliant with comprehensive HEP to maintain progress achieved in PT                    Plan  Continue skilled PT to help improve radicular symptoms and cervical ROM.    Treatment Last 4 Visits  Treatment Day: 7       5/8/2025 5/13/2025 5/15/2025 5/22/2025   Spine Treatment   Therapeutic Exercise PROM: cervical in  all directions  UT Stretch: 2x30\" R/L manually  Seated Chin Tucks: x20 with 3\" hold  Mulligan Mobilizations: x10 R/L cervical rotation  Standing Rows: x20 RTT  Standing Shoulder Extension: x20 YTT  Seated Scapular ER: x15 YTB PROM: cervical in all directions  UT Stretch: 2x30\" R/L manually  Seated Chin Tucks: x20 with 3\" hold  Mulligan Mobilizations: x10 R/L cervical rotation  Standing Rows: x20 RTT  Standing Shoulder Extension: x20 YTT  Seated Scapular ER: x15 YTB PROM: cervical in all directions  UT Stretch: 2x30\" R/L manually  Seated Chin Tucks: x20 with 3\" hold  Mulligan Mobilizations: x10 R/L cervical rotation  Standing Rows: x20 RTT  Standing Shoulder Extension: x20 YTT  Seated Scapular ER: x15 YTB PROM: cervical in all directions  UT Stretch: 2x30\" R/L manually  Seated Chin Tucks: x20 with 3\" hold  Mulligan Mobilizations: x10 R/L cervical rotation  Standing Rows: x20 RTT  Standing Shoulder Extension: x20 YTT  Seated Scapular ER: x15 YTB   Manual Therapy STW: cervical paraspinals, SCM, LS, UT, suboccipital release, scalenes STW: cervical paraspinals, SCM, LS, UT, suboccipital release, scalenes STW: cervical paraspinals, SCM, LS, UT, suboccipital release, scalenes STW: cervical paraspinals, SCM, LS, UT, suboccipital release, scalenes   Modalities Mechanical Cervical Traction: x10' 15# Mechanical Cervical Traction: x10' 18# Mechanical Cervical Traction: x10' 18# Mechanical Cervical Traction: x10' 18#   Therapeutic Exercise Minutes 10 10 10 10   Manual Therapy Minutes 20 20 20 20   Ultrasound Minutes 10      Mechanical Traction Minutes  10 10 10   Total Time Of Timed Procedures 40 30 30 30   Total Time Of Service-Based Procedures 0 10 10 10   Total Treatment Time 40 40 40 40   HEP Access Code: [email or web address]  URL: [email or web address]  Date: 05/08/2025  Prepared by: Kelsea Peralta    Exercises  - Supine Suboccipital Release with Tennis Balls  - 1 x daily - 7 x weekly - 5-10 minutes hold            HEP  Access Code: [email or web address]  URL: [email or web address]  Date: 05/08/2025  Prepared by: Kelsea Peralta    Exercises  - Supine Suboccipital Release with Tennis Balls  - 1 x daily - 7 x weekly - 5-10 minutes hold    Charges      Therex: x1  Manual Therapy: x1  Cervical Traction: x1

## 2025-05-23 RX ORDER — DULAGLUTIDE 0.75 MG/.5ML
0.75 INJECTION, SOLUTION SUBCUTANEOUS WEEKLY
Qty: 2 ML | Refills: 3 | Status: SHIPPED | OUTPATIENT
Start: 2025-05-23

## 2025-05-26 RX ORDER — LEVOTHYROXINE SODIUM 50 UG/1
50 TABLET ORAL
Qty: 90 TABLET | Refills: 3 | Status: SHIPPED | OUTPATIENT
Start: 2025-05-26

## 2025-06-02 ENCOUNTER — MED REC SCAN ONLY (OUTPATIENT)
Dept: INTERNAL MEDICINE CLINIC | Facility: CLINIC | Age: 70
End: 2025-06-02

## 2025-06-06 ENCOUNTER — OFFICE VISIT (OUTPATIENT)
Dept: PHYSICAL THERAPY | Age: 70
End: 2025-06-06
Attending: INTERNAL MEDICINE
Payer: MEDICARE

## 2025-06-06 DIAGNOSIS — M54.12 CERVICAL RADICULOPATHY: ICD-10-CM

## 2025-06-06 DIAGNOSIS — M54.2 NECK PAIN: Primary | ICD-10-CM

## 2025-06-06 PROCEDURE — 97012 MECHANICAL TRACTION THERAPY: CPT | Performed by: PHYSICAL THERAPIST

## 2025-06-06 PROCEDURE — 97110 THERAPEUTIC EXERCISES: CPT | Performed by: PHYSICAL THERAPIST

## 2025-06-06 PROCEDURE — 97140 MANUAL THERAPY 1/> REGIONS: CPT | Performed by: PHYSICAL THERAPIST

## 2025-06-06 NOTE — PROGRESS NOTES
Patient: Charlotte Hernández (69 year old, female) Referring Provider:  Insurance:   Diagnosis: Neck pain (M54.2), Cervical radiculopathy (M54.12) No ref. provider found  MEDICARE   Date of Surgery: N/A Next MD visit:  [facility]   Precautions:  None 6/9/2025 Referral Information:    Date of Evaluation: Req: 0, Auth: 0, Exp:     04/30/25 POC Auth Visits:  8       Today's Date   6/6/2025    Subjective  Pt reports \"I was walking a couple of days ago when I turned my head to both sides and felt cramping, electric-shock sensation that did not travel into my arms or hands, headaches 2x/week.\"       Pain: 4/10     Objective  See table below.        Assessment  Pt responded well to therapeutic interventions; however, continued TTP to the R UT and suboccipitals. Continued with cervical traction today to help with radicular symptoms.    Goals (to be met in 8 visits)   Pt will improve cervical AROM flexion to 80+ degrees to improve tolerance for looking down to tie shoes   Pt will improve cervical AROM extension to 30+ degrees to improve tolerance for putting dishes into overhead cabinets   Pt will improve cervical AROM rotation to >R=L degrees to improve tolerance for turning head to check blind spot while driving  Pt will have improved thoracic PA mobility to WNL to improve cervical ROM as well as promote upright posturing and decreased pain with driving   Pt will report decreased frequency of headaches to <1x/week  Pt will demonstrate improved cervical intrinsic strength to 4+/5 to allow improved cervical stabilization with overhead reaching (8 visits)  Pt will improve postural strength (mid/low trap) to 4/5 to promote improved upright posturing and decreased pain with turning the head   Pt will be independent and compliant with comprehensive HEP to maintain progress achieved in PT                      Plan  Continue skilled PT to help improve radicular symptoms and cervical ROM.    Treatment Last 4 Visits  Treatment  Day: 8       5/13/2025 5/15/2025 5/22/2025 6/6/2025   Spine Treatment   Therapeutic Exercise PROM: cervical in all directions  UT Stretch: 2x30\" R/L manually  Seated Chin Tucks: x20 with 3\" hold  Mulligan Mobilizations: x10 R/L cervical rotation  Standing Rows: x20 RTT  Standing Shoulder Extension: x20 YTT  Seated Scapular ER: x15 YTB PROM: cervical in all directions  UT Stretch: 2x30\" R/L manually  Seated Chin Tucks: x20 with 3\" hold  Mulligan Mobilizations: x10 R/L cervical rotation  Standing Rows: x20 RTT  Standing Shoulder Extension: x20 YTT  Seated Scapular ER: x15 YTB PROM: cervical in all directions  UT Stretch: 2x30\" R/L manually  Seated Chin Tucks: x20 with 3\" hold  Mulligan Mobilizations: x10 R/L cervical rotation  Standing Rows: x20 RTT  Standing Shoulder Extension: x20 YTT  Seated Scapular ER: x15 YTB PROM: cervical in all directions  UT Stretch: 2x30\" R/L manually  Seated Chin Tucks: x20 with 3\" hold  Mulligan Mobilizations: x10 R/L cervical rotation  Seated Rows: x20 RTT  Standing Shoulder Extension: x20 YTT  Seated Scapular ER: x15 RTB   Manual Therapy STW: cervical paraspinals, SCM, LS, UT, suboccipital release, scalenes STW: cervical paraspinals, SCM, LS, UT, suboccipital release, scalenes STW: cervical paraspinals, SCM, LS, UT, suboccipital release, scalenes STW: cervical paraspinals, SCM, LS, UT, suboccipital release, scalenes   Modalities Mechanical Cervical Traction: x10' 18# Mechanical Cervical Traction: x10' 18# Mechanical Cervical Traction: x10' 18# Mechanical Cervical Traction: x10' 18#   Therapeutic Exercise Minutes 10 10 10 10   Manual Therapy Minutes 20 20 20 20   Mechanical Traction Minutes 10 10 10 10   Total Time Of Timed Procedures 30 30 30 30   Total Time Of Service-Based Procedures 10 10 10 10   Total Treatment Time 40 40 40 40        HEP  Access Code: [email or web address]  URL: [email or web address]  Date: 05/08/2025  Prepared by: Kelsea Peralta    Exercises  - Supine  Suboccipital Release with Tennis Balls  - 1 x daily - 7 x weekly - 5-10 minutes hold    Charges     Therex: x1  Manual Therapy: x1   Cervical Traction: x1

## 2025-06-09 ENCOUNTER — OFFICE VISIT (OUTPATIENT)
Dept: INTERNAL MEDICINE CLINIC | Facility: CLINIC | Age: 70
End: 2025-06-09

## 2025-06-09 VITALS
DIASTOLIC BLOOD PRESSURE: 80 MMHG | HEIGHT: 62 IN | WEIGHT: 146 LBS | BODY MASS INDEX: 26.87 KG/M2 | OXYGEN SATURATION: 98 % | HEART RATE: 77 BPM | SYSTOLIC BLOOD PRESSURE: 128 MMHG | TEMPERATURE: 98 F

## 2025-06-09 DIAGNOSIS — M54.12 CERVICAL RADICULOPATHY: ICD-10-CM

## 2025-06-09 DIAGNOSIS — K21.9 GASTROESOPHAGEAL REFLUX DISEASE WITHOUT ESOPHAGITIS: ICD-10-CM

## 2025-06-09 DIAGNOSIS — M15.9 GENERALIZED OSTEOARTHROSIS, INVOLVING MULTIPLE SITES: ICD-10-CM

## 2025-06-09 DIAGNOSIS — E04.1 THYROID NODULE: ICD-10-CM

## 2025-06-09 DIAGNOSIS — I10 ESSENTIAL HYPERTENSION: ICD-10-CM

## 2025-06-09 DIAGNOSIS — E11.9 DIABETES MELLITUS TYPE 2 WITHOUT RETINOPATHY (HCC): ICD-10-CM

## 2025-06-09 DIAGNOSIS — E78.2 MIXED HYPERLIPIDEMIA: ICD-10-CM

## 2025-06-09 DIAGNOSIS — Z79.4 DIABETES MELLITUS TYPE 2, INSULIN DEPENDENT (HCC): Primary | ICD-10-CM

## 2025-06-09 DIAGNOSIS — G47.33 OBSTRUCTIVE SLEEP APNEA SYNDROME: ICD-10-CM

## 2025-06-09 DIAGNOSIS — E11.9 DIABETES MELLITUS TYPE 2, INSULIN DEPENDENT (HCC): Primary | ICD-10-CM

## 2025-06-09 LAB — HEMOGLOBIN A1C: 7.5 % (ref 4.3–5.6)

## 2025-06-09 RX ORDER — LATANOPROST 50 UG/ML
1 SOLUTION/ DROPS OPHTHALMIC EVERY EVENING
COMMUNITY
Start: 2025-04-22

## 2025-06-09 NOTE — PROGRESS NOTES
Subjective:   Charlotte Hernández is a 69 year old female who presents for a Medicare Subsequent Annual Wellness visit (Pt already had Initial Annual Wellness) and scheduled follow up of multiple significant but stable problems.   History of Present Illness    69-year-old pleasant lady here for Medicare annual wellness visit.  Overall she reports she is doing well except having neck pain.  She is getting physical therapy.  No abuse no depression no falls reported.  No significant urinary leakage.  History/Other:   Fall Risk Assessment:   She has been screened for Falls and is low risk.      Cognitive Assessment:   She had a completely normal cognitive assessment - see flowsheet entries     Functional Ability/Status:   Charlotte Hernández has some abnormal functions as listed below:  She has Vision problems based on screening of functional status.       Depression Screening (PHQ):  PHQ-2 SCORE: 0  , done 2025        <5 minutes spent screening and counseling for depression    Advanced Directives:   She does NOT have a Living Will. [Do you have a living will?: No]  She does NOT have a Power of  for Health Care. [Do you have a healthcare power of ?: No]  Discussed Advance Care Planning with patient (and family/surrogate if present). Standard forms made available to patient in After Visit Summary.      Problem List[1]  Allergies:  She has no known allergies.    Current Medications:  Active Meds, Sig Only[2]    Medical History:  She  has a past medical history of Anxiety state, Asthmatic bronchitis without complication (HCC) (2018), BPV (benign positional vertigo) (3/9/2022), Carpal tunnel syndrome (3/14/2016), Disorder of thyroid, Diverticular disease, DUB (dysfunctional uterine bleeding), Gastritis, Helicobacter pylori infection, High blood pressure, History of pregnancy (, , ), History of  (), Hyperlipemia, Observation for suspected condition, Osteoarthritis, Pap smear, as part of  routine gynecological examination (2021), Right shoulder tendinitis (2021), Routine physical examination (2021), Screen for colon cancer (), Sleep apnea, Syncope and collapse (2018), Type II or unspecified type diabetes mellitus without mention of complication, not stated as uncontrolled (), and Visual impairment.  Surgical History:  She  has a past surgical history that includes colonoscopy & polypectomy (2010); colonoscopy (); upper gi endoscopy,biopsy ();  (, ); endometrial bx conjunct w/colposcopy (); cholecystectomy (); carpal tunnel release (Right); and adj tiss xfer lid,nos,ear <10sqcm (Left, 10/15/2021).   Family History:  Her family history includes Diabetes in her father, mother, and sister; Heart Attack (age of onset: 83) in her mother; Heart Disease in her father; Lipids in an other family member.  Social History:  She  reports that she has never smoked. She has never used smokeless tobacco. She reports that she does not drink alcohol and does not use drugs.    Tobacco:  She has never smoked tobacco.    CAGE Alcohol Screen:   CAGE screening score of 0 on 2025, showing low risk of alcohol abuse.      Patient Care Team:  Hadley Calhoun MD as PCP - General (Internal Medicine)  SAVANNAH Buck MD (GASTROENTEROLOGY)    Review of Systems   Constitutional:  Negative for activity change, appetite change and fever.   HENT:  Negative for congestion and voice change.    Respiratory:  Negative for cough and shortness of breath.    Cardiovascular:  Negative for chest pain.   Gastrointestinal:  Negative for abdominal distention, abdominal pain and vomiting.   Genitourinary:  Negative for hematuria.   Skin:  Negative for wound.   Psychiatric/Behavioral:  Negative for behavioral problems.    Neck pain and shoulder pain present      Objective:   Physical Exam  Constitutional:       Appearance: Normal appearance.   HENT:      Head: Normocephalic.    Eyes:      Conjunctiva/sclera: Conjunctivae normal.   Cardiovascular:      Rate and Rhythm: Normal rate and regular rhythm.      Heart sounds: Normal heart sounds. No murmur heard.  Pulmonary:      Effort: Pulmonary effort is normal.      Breath sounds: Normal breath sounds. No rhonchi or rales.   Abdominal:      General: Bowel sounds are normal.      Palpations: Abdomen is soft.      Tenderness: There is no abdominal tenderness.   Musculoskeletal:      Cervical back: Neck supple.      Right lower leg: No edema.      Left lower leg: No edema.   Skin:     General: Skin is warm and dry.   Neurological:      General: No focal deficit present.      Mental Status: She is alert and oriented to person, place, and time. Mental status is at baseline.   Psychiatric:         Mood and Affect: Mood normal.         Behavior: Behavior normal.     Shoulder range of motion is okay.  No erythema no warmth.      /80   Pulse 77   Temp 98 °F (36.7 °C) (Temporal)   Ht 5' 2\" (1.575 m)   Wt 146 lb (66.2 kg)   LMP  (LMP Unknown)   SpO2 98%   BMI 26.70 kg/m²  Estimated body mass index is 26.7 kg/m² as calculated from the following:    Height as of this encounter: 5' 2\" (1.575 m).    Weight as of this encounter: 146 lb (66.2 kg).    Medicare Hearing Assessment:   Hearing Screening    Time taken: 6/9/2025 10:55 AM  Screening Method: Questionnaire  I have a problem hearing over the telephone: No I have trouble following the conversations when two or more people are talking at the same time: No   I have trouble understanding things on the TV: No I have to strain to understand conversations: No   I have to worry about missing the telephone ring or doorbell: No I have trouble hearing conversations in a noisy background such as a crowded room or restaurant: No   I get confused about where sounds come from: No I misunderstand some words in a sentence and need to ask people to repeat themselves: No   I especially have trouble  understanding the speech of women and children: No I have trouble understanding the speaker in a large room such as at a meeting or place of Religion: No   Many people I talk to seem to mumble (or don't speak clearly): No People get annoyed because I misunderstand what they say: No   I misunderstand what others are saying and make inappropriate responses: No I avoid social activities because I cannot hear well and fear I will reply improperly: No   Family members and friends have told me they think I may have hearing loss: No                   Assessment & Plan:   Charlotte Hernández is a 69 year old female who presents for a Medicare Assessment.     1. Diabetes mellitus type 2, insulin dependent (HCC) (Primary) A1c is improving.  -     POC Hemoglobin A1C  2. Diabetes mellitus type 2 without retinopathy (HCC) diabetes getting better.  Continue surveillance ophthalmology evaluation  3. Essential hypertension controlled  4. Mixed hyperlipidemia monitor lipid profile  5. Thyroid nodule  Overview:  Next USG 1/26  6. Cervical radiculopathy-currently she is getting physical therapy.  If no improvement, will consider physiatry evaluation.  Overview:  xr c spine 2015    CONCLUSION:  Mild bilateral neuroforaminal narrowing at C3-4 and C4-5.  Minimal progression of degenerative changes since 11/12/13.  Dictated by (CST):  Shay Park MD on 11/10/2015 at 10:22     Approved   7. Gastroesophageal reflux disease without esophagitis stable  8. Generalized osteoarthrosis, involving multiple sites stable  9. Obstructive sleep apnea syndrome she has been using and it has been helping.  Overview:  On cpap  Labs reviewed point-of-care hemoglobin A1c done today.  Up-to-date on Shingrix and PCV 20 vaccine fall prevention discussed I will see her back in 6 months  Assessment & Plan    The patient indicates understanding of these issues and agrees to the plan.        No follow-ups on file.     Hadley Calhoun MD, 6/9/2025     Supplementary  Documentation:   General Health:  In the past six months, have you lost more than 10 pounds without trying?: 2 - No  Has your appetite been poor?: No  Type of Diet: Low Salt  How does the patient maintain a good energy level?: Daily Walks  How would you describe your daily physical activity?: Moderate  How would you describe your current health state?: Good  How do you maintain positive mental well-being?: Games, Visiting Family  On a scale of 0 to 10, with 0 being no pain and 10 being severe pain, what is your pain level?: 2 - (Mild)  In the past six months, have you experienced urine leakage?: 0-No  At any time do you feel concerned for the safety/well-being of yourself and/or your children, in your home or elsewhere?: No  Have you had any immunizations at another office such as Influenza, Hepatitis B, Tetanus, or Pneumococcal?: No    Health Maintenance   Topic Date Due    COVID-19 Vaccine (5 - 2024-25 season) 09/01/2024    Diabetes Care A1C  12/09/2025    Diabetes Care Foot Exam  01/06/2026    Diabetes Care: GFR  02/13/2026    Mammogram  02/13/2026    Diabetes Care Dilated Eye Exam  05/29/2026    Annual Physical  06/09/2026    Colorectal Cancer Screening  02/07/2032    Influenza Vaccine  Completed    DEXA Scan  Completed    Annual Depression Screening  Completed    Fall Risk Screening (Annual)  Completed    Diabetes Care: Microalb/Creat Ratio (Annual)  Completed    Pneumococcal Vaccine: 50+ Years  Completed    Zoster Vaccines  Completed    Meningococcal B Vaccine  Aged Out            [1]   Patient Active Problem List  Diagnosis    Essential hypertension    Hyperlipidemia    Vitamin D deficiency    Sleep apnea    Allergic rhinitis    Mild depressive disorder (HCC)    Cervical radiculopathy    Diabetes mellitus type 2, insulin dependent (HCC)    GERD (gastroesophageal reflux disease)    Bilateral carpal tunnel syndrome    Atherosclerosis of aortic arch    Diabetes mellitus type 2 without retinopathy (HCC)     Age-related nuclear cataract of both eyes    Thyroid nodule    Generalized osteoarthrosis, involving multiple sites   [2]   Outpatient Medications Marked as Taking for the 6/9/25 encounter (Office Visit) with Hadley Calhoun MD   Medication Sig    latanoprost 0.005 % Ophthalmic Solution Apply 1 drop to eye every evening.    levothyroxine 50 MCG Oral Tab Take 1 tablet (50 mcg total) by mouth before breakfast.    Dulaglutide (TRULICITY) 0.75 MG/0.5ML Subcutaneous Solution Auto-injector Inject 0.75 mg into the skin once a week.    hydrOXYzine 10 MG Oral Tab Take 1 tablet (10 mg total) by mouth daily as needed for Itching.    metoprolol succinate ER 50 MG Oral Tablet 24 Hr Take 1 tablet (50 mg total) by mouth daily.    metFORMIN  MG Oral Tablet 24 Hr Take 2 tablets (1,000 mg total) by mouth daily with dinner.    Glucose Blood (ONETOUCH ULTRA) In Vitro Strip 1 each by In Vitro route 2 (two) times daily.    OneTouch Delica Lancets 33G Does not apply Misc 1 Lancet by Finger stick route 2 (two) times daily.    pantoprazole 40 MG Oral Tab EC Take 1 tablet (40 mg total) by mouth before breakfast.    atorvastatin 10 MG Oral Tab Take 1 tablet (10 mg total) by mouth nightly.    NovoLOG Mix 70/30 FlexPen 100 UNIT/ML Susp Pen-injector (Insulin Aspart Prot & Aspart 70/30) INJECT 34 UNITS SUBCUTANEOUS IN THE MORNING AND 24 UNITS AT NIGHT.    clotrimazole 1 % External Cream Apply 1 Application topically 2 (two) times daily. APPLY TO AFFECTED AREA    Hydroquinone 4 % External Cream Apply 1 Application topically 2 (two) times daily. Use bid as directed to face    Halobetasol Propionate 0.05 % External Ointment Apply a thin layer to affected area on back bid    fluocinonide 0.05 % External Cream Use twice daily on hands as directed    MAGNESIUM OXIDE 400 MG Oral Tab TAKE 1 TABLET BY MOUTH EVERY DAY    losartan 100 MG Oral Tab Take 1 tablet (100 mg total) by mouth daily.    Insulin Pen Needle (BD PEN NEEDLE CARY 2ND GEN) 32G X 4  MM Does not apply Misc Use as directed twice a day    amLODIPine 10 MG Oral Tab Take 1 tablet (10 mg total) by mouth daily.    betamethasone dipropionate 0.05 % External Cream Apply 1 Application topically 2 (two) times daily.

## 2025-06-10 ENCOUNTER — OFFICE VISIT (OUTPATIENT)
Dept: PHYSICAL THERAPY | Age: 70
End: 2025-06-10
Attending: INTERNAL MEDICINE
Payer: MEDICARE

## 2025-06-10 DIAGNOSIS — M54.12 CERVICAL RADICULOPATHY: ICD-10-CM

## 2025-06-10 DIAGNOSIS — M54.2 NECK PAIN: Primary | ICD-10-CM

## 2025-06-10 PROCEDURE — 97012 MECHANICAL TRACTION THERAPY: CPT | Performed by: PHYSICAL THERAPIST

## 2025-06-10 PROCEDURE — 97110 THERAPEUTIC EXERCISES: CPT | Performed by: PHYSICAL THERAPIST

## 2025-06-10 PROCEDURE — 97140 MANUAL THERAPY 1/> REGIONS: CPT | Performed by: PHYSICAL THERAPIST

## 2025-06-10 RX ORDER — MAGNESIUM OXIDE 400 MG/1
1 TABLET ORAL DAILY
Qty: 90 TABLET | Refills: 3 | Status: SHIPPED | OUTPATIENT
Start: 2025-06-10

## 2025-06-10 NOTE — TELEPHONE ENCOUNTER
Refill passed per [facility] protocols.    Requested Prescriptions   Pending Prescriptions Disp Refills    MAGNESIUM OXIDE 400 MG Oral Tab [Pharmacy Med Name: MAGNESIUM OXIDE 400 MG TABLET] 90 tablet 3     Sig: TAKE 1 TABLET BY MOUTH EVERY DAY       Gastrointestional Medication Protocol Passed - 6/10/2025  4:42 PM

## 2025-06-10 NOTE — PROGRESS NOTES
Patient: Charlotte Hernández (69 year old, female) Referring Provider:  Insurance:   Diagnosis: Neck pain (M54.2), Cervical radiculopathy (M54.12) No ref. provider found  MEDICARE   Date of Surgery: N/A Next MD visit:  [email or web address]   Precautions:  None 6/9/2025 Referral Information:    Date of Evaluation: Req: 0, Auth: 0, Exp:     04/30/25 POC Auth Visits:  10       Today's Date   6/10/2025    Subjective  Pt reports \"I still have pain in the nighttime (up to a 10/10 pain), I still have headaches once in a while at night and in the daytime.\"       Pain: 4/10     Objective  See table below.         Assessment  Pt responded well to therapeutic interventions; however, continued TTP to the R UT and suboccipitals. Continued with cervical traction today to help with radicular symptoms.    Goals (to be met in 10 visits)   Pt will improve cervical AROM flexion to 80+ degrees to improve tolerance for looking down to tie shoes   Pt will improve cervical AROM extension to 30+ degrees to improve tolerance for putting dishes into overhead cabinets   Pt will improve cervical AROM rotation to >R=L degrees to improve tolerance for turning head to check blind spot while driving  Pt will have improved thoracic PA mobility to WNL to improve cervical ROM as well as promote upright posturing and decreased pain with driving   Pt will report decreased frequency of headaches to <1x/week  Pt will demonstrate improved cervical intrinsic strength to 4+/5 to allow improved cervical stabilization with overhead reaching (8 visits)  Pt will improve postural strength (mid/low trap) to 4/5 to promote improved upright posturing and decreased pain with turning the head   Pt will be independent and compliant with comprehensive HEP to maintain progress achieved in PT                        Plan  Continue skilled PT to help improve radicular symptoms and cervical ROM.    Treatment Last 4 Visits  Treatment Day: 9       5/15/2025 5/22/2025 6/6/2025  6/10/2025   Spine Treatment   Therapeutic Exercise PROM: cervical in all directions  UT Stretch: 2x30\" R/L manually  Seated Chin Tucks: x20 with 3\" hold  Mulligan Mobilizations: x10 R/L cervical rotation  Standing Rows: x20 RTT  Standing Shoulder Extension: x20 YTT  Seated Scapular ER: x15 YTB PROM: cervical in all directions  UT Stretch: 2x30\" R/L manually  Seated Chin Tucks: x20 with 3\" hold  Mulligan Mobilizations: x10 R/L cervical rotation  Standing Rows: x20 RTT  Standing Shoulder Extension: x20 YTT  Seated Scapular ER: x15 YTB PROM: cervical in all directions  UT Stretch: 2x30\" R/L manually  Seated Chin Tucks: x20 with 3\" hold  Mulligan Mobilizations: x10 R/L cervical rotation  Seated Rows: x20 RTT  Standing Shoulder Extension: x20 YTT  Seated Scapular ER: x15 RTB PROM: cervical in all directions  UT Stretch: 2x30\" R/L manually  Seated Chin Tucks: x20 with 3\" hold  Mulligan Mobilizations: x10 R/L cervical rotation  Seated Rows: x20 RTT  Standing Shoulder Extension: x20 YTT  Seated Scapular ER: x15 RTB   Manual Therapy STW: cervical paraspinals, SCM, LS, UT, suboccipital release, scalenes STW: cervical paraspinals, SCM, LS, UT, suboccipital release, scalenes STW: cervical paraspinals, SCM, LS, UT, suboccipital release, scalenes STW: cervical paraspinals, SCM, LS, UT, suboccipital release, scalenes   Modalities Mechanical Cervical Traction: x10' 18# Mechanical Cervical Traction: x10' 18# Mechanical Cervical Traction: x10' 18# Mechanical Cervical Traction: x10' 18#   Therapeutic Exercise Minutes 10 10 10 10   Manual Therapy Minutes 20 20 20 20   Mechanical Traction Minutes 10 10 10 10   Total Time Of Timed Procedures 30 30 30 30   Total Time Of Service-Based Procedures 10 10 10 10   Total Treatment Time 40 40 40 40        HEP  Access Code: [email or web address]  URL: [email or web address]  Date: 05/08/2025  Prepared by: Kelsea Davey  - Supine Suboccipital Release with Tennis Balls  - 1 x daily  - 7 x weekly - 5-10 minutes hold    Charges   Therex: x1  Manual Therapy: x1  Mechanical Traction: x1

## 2025-06-12 NOTE — PROGRESS NOTES
[facility]  Vaginal Delivery   Operative Note    SUMMARY     Normal spontaneous vaginal delivery of live infant, was placed on mothers abdomen for skin to skin and bulb suctioning performed.  Infant delivered position OA over intact perineum.  No shoulder dystocia.  Nuchal cord: No.    Spontaneous delivery of placenta and IV pitocin given noting good uterine tone.  Uterine sweep reveals no retained products of conception.  No lacerations noted.  Patient tolerated delivery well. Sponge needle and lap counted correctly x2.    Patient desiring postpartum tubal ligation, operating room available at 4:30 p.m. will proceed at that time    Addendum at 1710-spoke with anesthesia multiple surgical cases currently going, explained to patient unlikely able to do tubal ligation tonight.  Patient understands will schedule as an outpatient procedure    Indications: Encounter for induction of labor  Pregnancy complicated by: Problem List[1]  Admitting GA: 39w1d    Viable female infant with Apgar scores of 8/9, weight pending     cc    Delivery Information for Kristi Blum    Birth information:  YOB: 2025   Time of birth: 11:55 AM   Sex: female   Head Delivery Date/Time:     Delivery type:    Gestational Age: 39w1d       Delivery Providers    Delivering clinician:            Measurements    Weight:   Length:          Apgars    Living status:   Apgar Component Scores:  1 min.:  5 min.:  10 min.:  15 min.:  20 min.:    Skin color:         Heart rate:         Reflex irritability:         Muscle tone:         Respiratory effort:         Total:                                  Interventions/Resuscitation           Cord    No data filed       Placenta    Placenta delivery date/time:   Placenta removal:            Labor Events:       labor:       Labor Onset Date/Time:         Dilation Complete Date/Time:         Start Pushing Date/Time:         Start Pushing Date/Time:       Rupture  HPI:    Patient ID: Angelina You is a 58year old female. Hand Pain    The pain is present in the left shoulder, left arm, left wrist, left hand, right shoulder, right arm, right wrist and right hand. This is a new problem.  The current episode started Date/Time: 25 0755        Rupture type: ARM (Artificial Rupture)        Fluid Amount:       Fluid Color: Clear              steroids:       Antibiotics given for GBS:       Induction:       Indications for induction:        Augmentation:       Indications for augmentation:       Labor complications:       Additional complications:          Cervical ripening:                     Delivery:      Episiotomy:       Indication for Episiotomy:       Perineal Lacerations:   Repaired:      Periurethral Laceration:   Repaired:     Labial Laceration:   Repaired:     Sulcus Laceration:   Repaired:     Vaginal Laceration:   Repaired:     Cervical Laceration:   Repaired:     Repair suture:       Repair # of packets:       Last Value - EBL - Nursing (mL):       Sum - EBL - Nursing (mL): 0     Last Value - EBL - Anesthesia (mL):      Calculated QBL (mL):       Running total QBL (mL):       Vaginal Sweep Performed:       Surgicount Correct:         Other providers:            Details (if applicable):  Trial of Labor      Categorization:      Priority:     Indications for :     Incision Type:       Additional  information:  Forceps:    Vacuum:    Breech:    Observed anomalies    Other (Comments):              [1]   Patient Active Problem List  Diagnosis    Encounter for induction of labor    Attention deficit hyperactivity disorder (ADHD)    SOLIS (generalized anxiety disorder)    Gastroesophageal reflux disease    BMI 32.0-32.9,adult    Laryngitis    Calculus of gallbladder with acute cholecystitis without obstruction    S/P laparoscopic cholecystectomy    Nausea and vomiting    Diarrhea    Urinary tract infection without hematuria    Cyst of left ovary    Type A blood, Rh negative      Potassium-HCTZ 100-25 MG Oral Tab Take 1 tablet by mouth daily. Disp: 90 tablet Rfl: 1   Pantoprazole Sodium 40 MG Oral Tab EC Take 40 mg by mouth every morning before breakfast.   Disp:  Rfl:    Glucose Blood In Vitro Strip Test blood sugar twice daily.  D aspirin 81 MG Oral Tab Take 81 mg by mouth daily.  Disp:  Rfl:    Meclizine HCl 12.5 MG Oral Tab 1 tablet by mouth twice a day when necessary Disp: 30 tablet Rfl: 0     Allergies:No Known Allergies      07/12/18  1544   BP: 134/64   Pulse: 64   Resp: 20 has had a history of carpal tunnel syndrome and is status post surgery for the right hand. XR C-spine in 2015 did show mild bilateral neuroforaminal narrowing at C3-C4 and C4-C5. Repeat x-ray of the C-spine has been ordered.   MRI of the cervical spine

## 2025-06-13 ENCOUNTER — OFFICE VISIT (OUTPATIENT)
Dept: PHYSICAL THERAPY | Age: 70
End: 2025-06-13
Attending: INTERNAL MEDICINE
Payer: MEDICARE

## 2025-06-13 DIAGNOSIS — M54.12 CERVICAL RADICULOPATHY: ICD-10-CM

## 2025-06-13 DIAGNOSIS — M54.2 NECK PAIN: Primary | ICD-10-CM

## 2025-06-13 PROCEDURE — 97140 MANUAL THERAPY 1/> REGIONS: CPT | Performed by: PHYSICAL THERAPIST

## 2025-06-13 PROCEDURE — 97012 MECHANICAL TRACTION THERAPY: CPT | Performed by: PHYSICAL THERAPIST

## 2025-06-13 PROCEDURE — 97110 THERAPEUTIC EXERCISES: CPT | Performed by: PHYSICAL THERAPIST

## 2025-06-13 NOTE — PROGRESS NOTES
Patient: Charlotte Hernández (69 year old, female) Referring Provider:  Insurance:   Diagnosis: Neck pain (M54.2), Cervical radiculopathy (M54.12) No ref. provider found  MEDICARE   Date of Surgery: N/A Next MD visit:  [facility]   Precautions:  None 6/9/2025 Referral Information:    Date of Evaluation: Req: 0, Auth: 0, Exp:     04/30/25 POC Auth Visits:  10       Today's Date   6/13/2025    Subjective  Pt reports \"the neck and shoulder are feeling better, I still have pain sometimes in the night and some headaches when looking down and up but overall improvement,\"       Pain: 0/10      Discharge Summary  Pt has attended 10 visits in Physical Therapy. Pt has met all goals and is discharged from skilled PT. Pt given comprehensive HEP for continued strengthening and stretching at home.    Objective  See table below.         Assessment  Pt responded well to therapeutic interventions with no increase in pain. Continued with cervical ROM and scapular strengthening for return to ADLs with minimal to no pain.    Goals (to be met in 10 visits)   Pt will improve cervical AROM flexion to 80+ degrees to improve tolerance for looking down to tie shoes   Pt will improve cervical AROM extension to 30+ degrees to improve tolerance for putting dishes into overhead cabinets   Pt will improve cervical AROM rotation to >R=L degrees to improve tolerance for turning head to check blind spot while driving  Pt will have improved thoracic PA mobility to WNL to improve cervical ROM as well as promote upright posturing and decreased pain with driving   Pt will report decreased frequency of headaches to <1x/week  Pt will demonstrate improved cervical intrinsic strength to 4+/5 to allow improved cervical stabilization with overhead reaching (8 visits)  Pt will improve postural strength (mid/low trap) to 4/5 to promote improved upright posturing and decreased pain with turning the head   Pt will be independent and compliant with  comprehensive HEP to maintain progress achieved in PT                          Plan  Continue skilled PT to help improve radicular symptoms and cervical ROM.    Treatment Last 4 Visits  Treatment Day: 10       5/22/2025 6/6/2025 6/10/2025 6/13/2025   Spine Treatment   Therapeutic Exercise PROM: cervical in all directions  UT Stretch: 2x30\" R/L manually  Seated Chin Tucks: x20 with 3\" hold  Mulligan Mobilizations: x10 R/L cervical rotation  Standing Rows: x20 RTT  Standing Shoulder Extension: x20 YTT  Seated Scapular ER: x15 YTB PROM: cervical in all directions  UT Stretch: 2x30\" R/L manually  Seated Chin Tucks: x20 with 3\" hold  Mulligan Mobilizations: x10 R/L cervical rotation  Seated Rows: x20 RTT  Standing Shoulder Extension: x20 YTT  Seated Scapular ER: x15 RTB PROM: cervical in all directions  UT Stretch: 2x30\" R/L manually  Seated Chin Tucks: x20 with 3\" hold  Mulligan Mobilizations: x10 R/L cervical rotation  Seated Rows: x20 RTT  Standing Shoulder Extension: x20 YTT  Seated Scapular ER: x15 RTB PROM: cervical in all directions  UT Stretch: 2x30\" R/L manually  Seated Chin Tucks: x20 with 3\" hold  Mulligan Mobilizations: x10 R/L cervical rotation  Seated Rows: x20 RTT  Standing Shoulder Extension: x20 YTT  Seated Scapular ER: x15 RTB   Manual Therapy STW: cervical paraspinals, SCM, LS, UT, suboccipital release, scalenes STW: cervical paraspinals, SCM, LS, UT, suboccipital release, scalenes STW: cervical paraspinals, SCM, LS, UT, suboccipital release, scalenes STW: cervical paraspinals, SCM, LS, UT, suboccipital release, scalenes   Modalities Mechanical Cervical Traction: x10' 18# Mechanical Cervical Traction: x10' 18# Mechanical Cervical Traction: x10' 18# Mechanical Cervical Traction: x10' 18#   Therapeutic Exercise Minutes 10 10 10 10   Manual Therapy Minutes 20 20 20 20   Mechanical Traction Minutes 10 10 10 10   Total Time Of Timed Procedures 30 30 30 30   Total Time Of Service-Based Procedures 10 10 10 10    Total Treatment Time 40 40 40 40        HEP  Access Code: [email or web address]  URL: [email or web address]  Date: 05/08/2025  Prepared by: Kelsea Peralta    Exercises  - Supine Suboccipital Release with Tennis Balls  - 1 x daily - 7 x weekly - 5-10 minutes hold    Charges   Therex: x1  Manual Therapy: x1  Mechanical Traction: x1         Plan: Pt discharged from skilled PT.    Patient/Family/Caregiver was advised of these findings, precautions, and treatment options and has agreed to actively participate in planning and for this course of care.    Thank you for your referral. If you have any questions, please contact me at Dept: [phone].    Sincerely,  Electronically signed by therapist: Kelsea Peralta PT     Physician's certification required:  No  Please co-sign or sign and return this letter via fax as soon as possible to [phone].   I certify the need for these services furnished under this plan of treatment and while under my care.    X___________________________________________________ Date____________________    Certification From: 6/13/2025  To:9/11/2025

## 2025-06-16 RX ORDER — AMLODIPINE BESYLATE 10 MG/1
10 TABLET ORAL DAILY
Qty: 90 TABLET | Refills: 3 | Status: SHIPPED | OUTPATIENT
Start: 2025-06-16

## 2025-06-16 RX ORDER — PEN NEEDLE, DIABETIC 32GX 5/32"
1 NEEDLE, DISPOSABLE MISCELLANEOUS 2 TIMES DAILY
Qty: 200 EACH | Refills: 3 | Status: SHIPPED | OUTPATIENT
Start: 2025-06-16

## 2025-06-16 RX ORDER — LOSARTAN POTASSIUM 100 MG/1
100 TABLET ORAL DAILY
Qty: 90 TABLET | Refills: 3 | Status: SHIPPED | OUTPATIENT
Start: 2025-06-16

## 2025-06-16 NOTE — TELEPHONE ENCOUNTER
Refill passes per [facility] protocol.    Future Appointments   Date Time Provider Department Center   12/9/2025  8:00 AM Hadley Calhoun MD [facility]

## 2025-06-16 NOTE — TELEPHONE ENCOUNTER
Refill passes per [facility] protocol.    Future Appointments   Date Time Provider Department Center   12/9/2025  8:00 AM Hadley Calhoun MD [facility]

## (undated) DIAGNOSIS — K63.5 POLYP OF DESCENDING COLON, UNSPECIFIED TYPE: ICD-10-CM

## (undated) DIAGNOSIS — Z01.818 PREOP EXAMINATION: ICD-10-CM

## (undated) DIAGNOSIS — K57.30 DIVERTICULA OF COLON: ICD-10-CM

## (undated) DEVICE — 6 ML SYRINGE LUER-LOCK TIP: Brand: MONOJECT

## (undated) DEVICE — GAMMEX® PI HYBRID SIZE 7, STERILE POWDER-FREE SURGICAL GLOVE, POLYISOPRENE AND NEOPRENE BLEND: Brand: GAMMEX

## (undated) DEVICE — DRAPE SRG 50X36IN HD TRBN STRL

## (undated) DEVICE — SOL  .9 1000ML BTL

## (undated) DEVICE — OUTPATIENT: Brand: MEDLINE INDUSTRIES, INC.

## (undated) DEVICE — BLADE 11 SHRP BP SS SRG STRL

## (undated) DEVICE — SUTURE ETHILON 5-0 698G

## (undated) DEVICE — HEX-LOCKING BLADE ELECTRODE: Brand: EDGE

## (undated) DEVICE — GAUZE SPONGES,12 PLY: Brand: CURITY

## (undated) DEVICE — PEN 6\" SURGICAL MARKING PURPL

## (undated) NOTE — LETTER
[address]   Date:   4/6/2021     Name:   Akil Sutton    YOB: 1955   MRN:   [id number]       WHERE IS YOUR PAIN NOW? Eddie the areas on your body where you feel the described sensations.   Use the appropriate sym

## (undated) NOTE — LETTER
[address]   Date:   2/18/2021     Name:   Vincent Ritter    YOB: 1955   MRN:   [id number]       WHERE IS YOUR PAIN NOW? Eddie the areas on your body where you feel the described sensations.   Use the appropriate sy

## (undated) NOTE — LETTER
WHERE IS YOUR PAIN NOW?  Sena the areas on your body where you feel the described sensations.  Use the appropriate symbol.  Sena the areas of radiation.  Include all affected areas.  Just to complete the picture, please draw in the face.     ACHE:  ^ ^ ^   NUMBNESS:  0000   PINS & NEEDLES:  = = = =                              ^ ^ ^                       0000              = = = =                                    ^ ^ ^                       0000            = = = =      BURNING:  XXXX   STABBING: ////                  XXXX                ////                         XXXX          ////     Please sena the line below indicating your degree of pain right now  with 0 being no pain 10 being the worst pain possible.                                         0             1             2              3             4              5              6              7             8             9             10         Patient Signature:

## (undated) NOTE — LETTER
2/20/2020              Charlotte Gudino [facility] [address]         Dear Adrienne Patel,    This letter is to inform you that our office has made several attempts to reach you by phone without success.   We were attempting to contact yo

## (undated) NOTE — LETTER
AUTHORIZATION FOR SURGICAL OPERATION OR OTHER PROCEDURE    1. I hereby authorize Dr. Mo Mak and the [facility] staff assigned to my case to perform the following operation and/or procedure at the [facility]:    Right knee injection     2.  My physician has explained the nature and purpose of the operation or other procedure, possible alternative methods of treatment, the risks involved, and the possibility of complication to me.  I acknowledge that no guarantee has been made as to the result that may be obtained.  3.  I recognize that, during the course of this operation, or other procedure, unforseen conditions may necessitate additional or different procedure than those listed above.  I, therefore, further authorize and request that the above named physician, his/her physician assistants or designees perform such procedures as are, in his/her professional opinion, necessary and desirable.  4.  Any tissue or organs removed in the operation or other procedure may be disposed of by and at the discretion of the [facility] staff and [facility].  5.  I understand that in the event of a medical emergency, I will be transported by local paramedics to [facility] or other hospital emergency department.  6.  I certify that I have read and fully understand the above consent to operation and/or other procedure.    7.  I acknowledge that my physician has explained sedation/analgesia administration to me including the risks and benefits.  I consent to the administration of sedation/analgesia as may be necessary or desirable in the judgement of my physician.    Witness signature: ___________________________________________________ Date:  ______/______/_____                    Time:  ________ A.M.  P.M.       Patient Name:  Charlotte Hernández  11/27/1955  [id number]         Patient signature:  ___________________________________________________                 Statement of Physician  My  signature below affirms that prior to the time of the procedure, I have explained to the patient and/or his/her guardian, the risks and benefits involved in the proposed treatment and any reasonable alternative to the proposed treatment.  I have also explained the risks and benefits involved in the refusal of the proposed treatment and have answered the patient's questions.                        Date:  ______/______/_______  Provider                      Signature:  __________________________________________________________       Time:  ___________ A.M    P.M.

## (undated) NOTE — MR AVS SNAPSHOT
[address] [address]  [phone]               Thank you for choosing us for your health care visit with Damion Villanueva MD.  We are glad to serve you and happy to provide you with this summary Type II diabetes mellitus ([facility] - Primary     Blood sugars remain elevated–hemoglobin A1c is at 8.3. She was advised to increase the NovoLog mix to 60 units 2 times daily but patient did not understand it and has not increased the dose of the insulin.   Dieudonne Acevedo USE AS DIRECTED TWICE A DAY           cetirizine 10 MG Tabs   Take 1 tablet (10 mg total) by mouth daily. Commonly known as:  ZYRTEC           DiltiaZEM HCl ER Beads 180 MG Cp24   Take 1 capsule (180 mg total) by mouth daily.    Commonly known as:  [facility] discharge instructions in [employer] by going to Visits < Admission Summaries. If you've been to the Emergency Department or your doctor's office, you can view your past visit information in [employer] by going to Visits < Visit Summaries. Panelfly questions?

## (undated) NOTE — LETTER
QUINN Notifier: [employer]. Patient Name: Charlotte Hernández Identification Number: [id number]                          Advance Beneficiary Notice of Noncoverage (ABN)   NOTE:  If Medicare doesn’t pay for D. item/service(s) below, you may have to pay.  Medicare does not pay for everything, even some care that you or your health care provider have good reason to think you need. We expect Medicare may not pay for the D. item/service(s) below.  D. Items or Services  Right knee injection  E. Reason Medicare May Not Pay: F. Estimated Cost   __ EKG ($87.00)  __ Pap smear ($101) __Pelvic/Breast ($147.00)  __ Ear Irrigation ($138)  _X_ Injection(s)  ___ Tdap ($181)       ___ Meningitis ($290)   __Prevnar ($555)  ___ Td ($66)              ___ Prevnar 20 ($549)  ___ Hep A ($152)     ___ Prolia ($1827)         __ Xiaflex ($            )   ___ Hep B ($150)     ___ Pneumovax ($287)                                         ___ Vaccine Administration ($65)   __ Medicare does not cover this service      __ Medicare may not pay for this   item/service for your condition     __ Medicare may not pay for this item/service as often as this        WHAT YOU NEED TO DO NOW:  Read this notice, so you can make an informed decision about your care.  Ask us any questions that you may have after you finish reading.  Choose an option below about whether to receive the D. item/service(s) listed above.  Note: If you choose Option 1 or 2, we may help you to use any other insurance that you might have, but Medicare cannot require us to do this.  G. OPTIONS: Check only one box.  We cannot choose a box for you.   OPTION 1. I want the D. item/service(s) listed above. You may ask to be paid now, but I also want Medicare billed for an official decision on payment, which is sent to me on a Medicare Summary Notice (MSN). I understand that if Medicare doesn’t pay, I am responsible for payment, but I can appeal to Medicare by following the  directions on the MSN. If Medicare does pay, you will refund any payments I made to you, less co-pays or deductibles.  OPTION 2. I want the D. item/service(s) listed above, but do not bill Medicare. You may ask to be paid now as I am responsible for payment. I cannot appeal if Medicare is not billed.  OPTION 3. I don't want the D. item/service(s) listed above. I understand with this choice I am not responsible for payment, and I cannot appeal to see if Medicare would pay.    H. Additional Information:    This notice gives our opinion, not an official Medicare decision. If you have other questions on this notice or Medicare billing, call 1-800-MEDICARE (1-353.221.9356/TTY: [phone]). Signing below means that you have received and understand this notice. You also receive a copy.  I. Signature: J. Date:       You have the right to get Medicare information in an accessible format, like large print, Braille, or audio. You also have the right to file a complaint if you feel you’ve been discriminated against. Visit Medicare.gov/about- us/rpchhxprkupph-gfrzzqbyrtottjhnx-kmkrek.  According to the Paperwork Reduction Act of 1995, no persons are required to respond to a collection of information unless it displays a valid OMB control number. The valid OMB control number for this information collection is 5104-1749. The time required to complete this information collection is estimated to average 7 minutes per response, including the time to review instructions, search existing data resources, gather the data needed, and complete and review the information collection. If you have comments concerning the accuracy of the time estimate or suggestions for improving this form, please write to: CMS, [address]     Jannet Attn: RAYMUNDO Reports Clearance Officer, [place], [place] [address].  Form CMS-R-131 (Exp. 1/31/2026) Form Approved OMB No. 3375-2662

## (undated) NOTE — LETTER
4/26/2021          To Whom It May Concern:    Nena Perri is currently under my medical care. Please reduce her work hours to 69 hours bi-weekly until her next follow up appointment with me. Her work status will be re assessed at that time.  No work res

## (undated) NOTE — LETTER
21          Charlotte Betty  :  1955      To Whom It May Concern: This patient was seen in our office on 21. Work status: She should remain off work until 21, when she may return to work full duty.       If this office may be o

## (undated) NOTE — MR AVS SNAPSHOT
[place]  [address]  [phone]               Thank you for choosing us for your health care visit with Meaghan Bustamante MD.  We are glad to serve you and happy to provide you with this her[facility]    It is the patient's responsibility to check with and follow their insurance company's guidelines for prior authorization for this test.  You may be held responsible for payment in full if Commonly known as:  SYNTHROID           Losartan Potassium-HCTZ 100-12.5 MG Tabs   Take 1 tablet by mouth once daily.    Commonly known as:  HYZAAR           Meclizine HCl 12.5 MG Tabs   1 tablet by mouth twice a day when necessary   Commonly known as:  ANT

## (undated) NOTE — LETTER
08/31/17        Charlotte Hernández  [address] [facility]      Dear [facility] records indicate that you have outstanding lab work and or testing that was ordered for you and has not yet been completed: Magnesium and Vit B12    To provide y

## (undated) NOTE — Clinical Note
Thank you for the consult. I saw  Ms. Hernández in the endocrine/diabetes clinic today. Please see attached my note. Please feel free to contact me with any questions. Thanks!

## (undated) NOTE — LETTER
21          Charlotte Betty  :  1955      To Whom It May Concern: This patient was seen in our office on 21. She has had increasing right shoulder pain and will undergo MRI of the right shoulder.  I filled out [facility] paperwork for her e

## (undated) NOTE — LETTER
12/19/17        Charlotte Hernández  [address] [facility]      Dear [facility]oum[facility] Andrews records indicate that you have outstanding lab work and or testing that was ordered for you and has not yet been completed:          CBC W Differential W Platele

## (undated) NOTE — MR AVS SNAPSHOT
[place]  [address]  [phone]               Thank you for choosing us for your health care visit with [facility], OD.   We are glad to serve you and happy to provide you with this summary of No treatment is required. Will continue to observe. Hyperopia with presbyopia  Patient is happy with her current reading glasses and will use +1.00 OTC for TV viewing.              Allergies as of Etienne 15, 2017     No Known Allergies                   Cur metRONIDAZOLE 500 MG Tabs   1 tab po bid   Commonly known as:  FLAGYL           NOVOLOG MIX 70/30 FLEXPEN (70-30) 100 UNIT/ML Supn   Generic drug:  Insulin Aspart Prot & Aspart   55 UNITS SUBCUTANEOUS TWICE DAILY           Pantoprazole Sodium 40 MG Tbec

## (undated) NOTE — MR AVS SNAPSHOT
[facility] \[facility] [place] [facility]\"" - [place] [address] [address] [address] [phone]               Thank you for choosing us for your health care visit with John Han MD.  We are glad to serve you and happy to provide you with this summary of yo Order:  Podiatry - Internal    Nabila Kemp DPM   [address]   [address] [facility] [address]   Phone:  [phone]   Fax:  [phone]         Referral Orders      Normal Orders This Visit    ORTHOPEDIC - INTERNAL [[id number]]  [facility] Dx:  Type 2 diabetes mellitus without complication, with long-term current use of insulin (HCC) [E11.9, Z79.4]          Reason for Today's Visit     Diabetes     Leg Pain           Medical Issues Discussed Today     Ankle pain, right    Essential hy Type II diabetes mellitus ([facility]     Patient is currently on NovoLog mix 55 units 2 times daily, metformin 500 mg 2 times daily and glimepiride 2 mg 1 tablet daily with breakfast.  Her blood sugars seem to have improved.   No low sugar reactions at this marly fenofibrate micronized 134 MG Caps   TAKE ONE CAPSULE BY MOUTH EVERY DAY WITH FOOD   What changed:  Another medication with the same name was removed. Continue taking this medication, and follow the directions you see here.    Commonly known as:  LOFIBRA You can access your MyChart to more actively manage your health care and view more details from this visit by going to [email or web address].   If you've recently had a stay at the Hospital you can access your discharge instructions in [address] by magdalena

## (undated) NOTE — LETTER
10/07/21      Patient: Vu Wang  : 1955 Visit date: 10/7/2021    Dear Andres Marques,      I examined your patient in consultation today. She has a left cleft ear lobule. We will plan on soft tissue reconstruction.     Thank you fo

## (undated) NOTE — LETTER
September 9, 2019     Charlotte Hernández  [address] [facility] [place], [place]      Dear Randolph Cowden:    Below are the results from your recent visit:    Resulted Orders   [facility] INDIO 2D+3D SCREENING BILAT (CPT=77067/46441)    Narrative    PROCEDURE: [facility] INDIO 2D+3D RICHIE Blackwell   Working with Dr. Sharmaine Avila     If you have any questions or concerns, please don't hesitate to call.

## (undated) NOTE — MR AVS SNAPSHOT
[address]  [address]  [phone]               Thank you for choosing us for your health care visit with Marvin Dawson DPM.  We are glad to serve you and happy to provide yo [facility] for [facility]  [address], [address]         Reason for Today's Visit     Diabetic Foot Care           Medical Issues Discussed Today     Diabetic mononeuropathy associated with type 2 diabetes mellitus Losartan Potassium-HCTZ 100-12.5 MG Tabs   Take 1 tablet by mouth once daily.    Commonly known as:  HYZAAR           Meclizine HCl 12.5 MG Tabs   1 tablet by mouth twice a day when necessary   Commonly known as:  ANTIVERT           MetFORMIN HCl ER

## (undated) NOTE — LETTER
2021              Charlotte Hernández        [address] [facility] [address]         To Whom it May Concern:    Charlotte Hernández ( 55) is currently under my care at the [facility].  Please reduce her work hours to pa

## (undated) NOTE — LETTER
AUTHORIZATION FOR SURGICAL OPERATION OR OTHER PROCEDURE    1.  I hereby authorize ARSENIO Barajas, and [facility] staff assigned to my case to perform the following operation and/or procedure at the [facility]:    Cortisone injection to jalen Time:  ________ A. M.  P.M.        Patient Name:  ______________________________________________________  (please print)      Patient signature:  ___________________________________________________             Relationship to Patient:           []  Pa

## (undated) NOTE — LETTER
21              Charlotte Betty  :  1955        To Whom It May Concern:     This patient was seen in our office on 21.   Work status: She should remain off work until 21, when she may return to work full duty.        If this office

## (undated) NOTE — MR AVS SNAPSHOT
Critical access hospital - [place] [address] [address] [address] [phone]               Thank you for choosing us for your health care visit with Caroline Grijalva MD.  We are glad to serve you and happy to provide you with this summary of yo [place], [address] ([address])  [address], [address] [facility]  [address] [facility], [address] [facility] This list is accurate as of: 3/15/17  8:59 AM.  Always use your most recent med list.                ammonium lactate 12 % Lotn   Apply 1 Application topically 2 (two) times daily.    Commonly known as:  LAC-HYDRIN           aspirin 81 MG Tabs   Take 81 mg MyChart     Visit [employer]  You can access your MyChart to more actively manage your health care and view more details from this visit by going to [email or web address].   If you've recently had a stay at the Hospital you can access your discharge ins ? Cover porch steps with gritty weather proof paint. ? Pay attention to curbs and other changes in surfaces when out in the community. ? Take care when walking on gravel or grassy surfaces. ? Avoid walking on snowy or icy surfaces. ?  Use a cane or walk

## (undated) NOTE — LETTER
Agata Landon, [address]  [place],  [place]       05/18/21        Patient: Lauryn Stevens   YOB: 1955   Date of Visit: 5/18/2021       Dear  Dr. Antonella Del Rio MD,      Thank you for referring Lauryn Stevens to my practice.   P

## (undated) NOTE — MR AVS SNAPSHOT
[address] [address]  [phone]               Thank you for choosing us for your health care visit with Leslie Fregoso MD.  We are glad to serve you and happy to provide you with this summary Problem List Items Addressed This Visit        Unprioritized    Abdominal wall cellulitis - Primary     Abdominal pain–left upper quadrant has resolved. Patient has completed antibiotics. She does not have any nausea vomiting fevers or chills anymore.   B Clindamycin HCl 300 MG Caps   1 capsule po bid   Commonly known as:  CLEOCIN           DilTIAZem HCl ER Beads 180 MG Cp24   Take 1 capsule (180 mg total) by mouth daily.    Commonly known as:  TIAZAC           escitalopram 5 MG Tabs   TAKE 1 TABLET (5 MG T Call [phone] for help. Z-goodt is NOT to be used for urgent needs. For medical emergencies, dial 911.            Visit [facility] online at  [email or web address]

## (undated) NOTE — MR AVS SNAPSHOT
[address] [address]  [phone]               Thank you for choosing us for your health care visit with John Han MD.  We are glad to serve you and happy to provide you with this summary BP Pulse Temp Height Weight BMI    134/78 mmHg 69 97.7 °F (36.5 °C) (Oral) 5' 2\" (1.575 m) 155 lb 12.8 oz (70.67 kg) 28.49 kg/m2    Breastfeeding?                    No              Current Medications          This list is accurate as of: 1/12/17 11:44 A Return in about 6 weeks (around 2/23/2017). MyChart     Visit [employer]  You can access your [employer] to more actively manage your health care and view more details from this visit by going to [email or web address].   If you've recently had a s Don’t forget strength training with weights and resistance Set goals and track your progress   You don’t need to join a gym. Home exercises work great.  Put more priority on exercise in your life                    Visit [facility] online at